# Patient Record
Sex: FEMALE | URBAN - METROPOLITAN AREA
[De-identification: names, ages, dates, MRNs, and addresses within clinical notes are randomized per-mention and may not be internally consistent; named-entity substitution may affect disease eponyms.]

---

## 2021-12-06 ENCOUNTER — APPOINTMENT (OUTPATIENT)
Dept: URBAN - METROPOLITAN AREA CLINIC 268 | Age: 76
Setting detail: DERMATOLOGY
End: 2021-12-07

## 2021-12-06 PROBLEM — C44.311 BASAL CELL CARCINOMA OF SKIN OF NOSE: Status: ACTIVE | Noted: 2021-12-06

## 2021-12-06 PROBLEM — C44.319 BASAL CELL CARCINOMA OF SKIN OF OTHER PARTS OF FACE: Status: ACTIVE | Noted: 2021-12-06

## 2021-12-06 PROCEDURE — OTHER TREATMENT REGIMEN: OTHER

## 2021-12-06 PROCEDURE — 77285 THER RAD SIMULAJ FIELD INTRM: CPT

## 2021-12-06 PROCEDURE — 99213 OFFICE O/P EST LOW 20 MIN: CPT | Mod: 25

## 2021-12-06 PROCEDURE — 77300 RADIATION THERAPY DOSE PLAN: CPT

## 2021-12-06 PROCEDURE — 77334 RADIATION TREATMENT AID(S): CPT

## 2021-12-06 PROCEDURE — G6001 ECHO GUIDANCE RADIOTHERAPY: HCPCS

## 2021-12-06 PROCEDURE — 77262 THER RADIOLOGY TX PLNG INTRM: CPT

## 2021-12-06 PROCEDURE — OTHER SUPERFICIAL RADIATION TREATMENT: OTHER

## 2021-12-06 NOTE — PROCEDURE: SUPERFICIAL RADIATION TREATMENT
Bill For Simulation And Treatment Device Design: Yes - (Intermediate Simulation: 14999) Bill For Simulation And Treatment Device Design: Yes - (Intermediate Simulation: 55329)

## 2021-12-06 NOTE — PROCEDURE: TREATMENT REGIMEN
Plan: Per the request of Dr. Martinez, patient was seen today for Superficial Radiation Therapy requiring simulation (CPT® 96804) in preparation for treatment of specific diseased sites. Simulation is necessary to determine correct patient and treatment portal positioning, deliver safe and effective radiation therapy. A high frequency ultrasound image was acquired prior to treatment today for three-dimensional evaluation of tumor volume and response to treatment, in addition, geometric accuracy of field placement (CPT® ). Physician evaluation of the ultrasound tumor depth will be ongoing through course of treatment and is deemed medically necessary ensuring efficacy of treatment. Today’s image and setup was evaluated determining continuation of treatment with the current plan, or necessary changes as appropriate. All appropriate custom blocking and treatment parameters verified by radiation therapist according to initial simulation.\\n\\nUS image guidance and field placement prior to treatment delivery performed. US depth is 1.62 mm.\\n\\nPer Dr. Martinez, continued daily US guidance and simulation is required for field placement, measurement of tumor depth, progress, and edema monitoring.\\n\\nPer the request of Dr. Martinez, continuing medical physics review as per radiotherapy standard of care post every 5th fraction for patient, including assessment of treatment parameters,  of dose delivery, and review of patient treatment documentation in support of the provider, is ordered, ensuring efficacy and continued safe delivery of radiotherapy. Included in physics check is review of patient setup information, all pertinent simulation and treatment photographs checks, prescription, dose calculation verification, daily dose charted correctly, elapsed days and treatment days correctly charted, cumulative dose correct, and review of any prescription changes. Continued medical physics review post every 5th fraction of therapy is requested by provider for appropriate radiotherapy management and is deemed medically necessary and standard of care.\\n Plan: Per the request of Dr. Martinez, patient was seen today for Superficial Radiation Therapy requiring simulation (CPT® 10566) in preparation for treatment of specific diseased sites. Simulation is necessary to determine correct patient and treatment portal positioning, deliver safe and effective radiation therapy. A high frequency ultrasound image was acquired prior to treatment today for three-dimensional evaluation of tumor volume and response to treatment, in addition, geometric accuracy of field placement (CPT® ). Physician evaluation of the ultrasound tumor depth will be ongoing through course of treatment and is deemed medically necessary ensuring efficacy of treatment. Today’s image and setup was evaluated determining continuation of treatment with the current plan, or necessary changes as appropriate. All appropriate custom blocking and treatment parameters verified by radiation therapist according to initial simulation.\\n\\nUS image guidance and field placement prior to treatment delivery performed. US depth is 1.62 mm.\\n\\nPer Dr. Martinez, continued daily US guidance and simulation is required for field placement, measurement of tumor depth, progress, and edema monitoring.\\n\\nPer the request of Dr. Martinez, continuing medical physics review as per radiotherapy standard of care post every 5th fraction for patient, including assessment of treatment parameters,  of dose delivery, and review of patient treatment documentation in support of the provider, is ordered, ensuring efficacy and continued safe delivery of radiotherapy. Included in physics check is review of patient setup information, all pertinent simulation and treatment photographs checks, prescription, dose calculation verification, daily dose charted correctly, elapsed days and treatment days correctly charted, cumulative dose correct, and review of any prescription changes. Continued medical physics review post every 5th fraction of therapy is requested by provider for appropriate radiotherapy management and is deemed medically necessary and standard of care.\\n

## 2021-12-06 NOTE — PROCEDURE: TREATMENT REGIMEN
Plan: Per the request of Dr. Martinez, patient was seen today for Superficial Radiation Therapy requiring simulation (CPT® 46543) in preparation for treatment of specific diseased sites. Simulation is necessary to determine correct patient and treatment portal positioning, deliver safe and effective radiation therapy. A high frequency ultrasound image was acquired prior to treatment today for three-dimensional evaluation of tumor volume and response to treatment, in addition, geometric accuracy of field placement (CPT® ). Physician evaluation of the ultrasound tumor depth will be ongoing through course of treatment and is deemed medically necessary ensuring efficacy of treatment. Today’s image and setup was evaluated determining continuation of treatment with the current plan, or necessary changes as appropriate. All appropriate custom blocking and treatment parameters verified by radiation therapist according to initial simulation.\\n\\nUS image guidance and field placement prior to treatment delivery performed. US depth is 2.22 mm.\\n\\nPer Dr. Martinez, continued daily US guidance and simulation is required for field placement, measurement of tumor depth, progress, and edema monitoring.\\n\\nPer the request of Dr. Martinez, continuing medical physics review as per radiotherapy standard of care post every 5th fraction for patient, including assessment of treatment parameters,  of dose delivery, and review of patient treatment documentation in support of the provider, is ordered, ensuring efficacy and continued safe delivery of radiotherapy. Included in physics check is review of patient setup information, all pertinent simulation and treatment photographs checks, prescription, dose calculation verification, daily dose charted correctly, elapsed days and treatment days correctly charted, cumulative dose correct, and review of any prescription changes. Continued medical physics review post every 5th fraction of therapy is requested by provider for appropriate radiotherapy management and is deemed medically necessary and standard of care.\\n Plan: Per the request of Dr. Martinez, patient was seen today for Superficial Radiation Therapy requiring simulation (CPT® 98953) in preparation for treatment of specific diseased sites. Simulation is necessary to determine correct patient and treatment portal positioning, deliver safe and effective radiation therapy. A high frequency ultrasound image was acquired prior to treatment today for three-dimensional evaluation of tumor volume and response to treatment, in addition, geometric accuracy of field placement (CPT® ). Physician evaluation of the ultrasound tumor depth will be ongoing through course of treatment and is deemed medically necessary ensuring efficacy of treatment. Today’s image and setup was evaluated determining continuation of treatment with the current plan, or necessary changes as appropriate. All appropriate custom blocking and treatment parameters verified by radiation therapist according to initial simulation.\\n\\nUS image guidance and field placement prior to treatment delivery performed. US depth is 2.22 mm.\\n\\nPer Dr. Martinez, continued daily US guidance and simulation is required for field placement, measurement of tumor depth, progress, and edema monitoring.\\n\\nPer the request of Dr. Martinez, continuing medical physics review as per radiotherapy standard of care post every 5th fraction for patient, including assessment of treatment parameters,  of dose delivery, and review of patient treatment documentation in support of the provider, is ordered, ensuring efficacy and continued safe delivery of radiotherapy. Included in physics check is review of patient setup information, all pertinent simulation and treatment photographs checks, prescription, dose calculation verification, daily dose charted correctly, elapsed days and treatment days correctly charted, cumulative dose correct, and review of any prescription changes. Continued medical physics review post every 5th fraction of therapy is requested by provider for appropriate radiotherapy management and is deemed medically necessary and standard of care.\\n

## 2021-12-07 ENCOUNTER — APPOINTMENT (OUTPATIENT)
Dept: URBAN - METROPOLITAN AREA CLINIC 268 | Age: 76
Setting detail: DERMATOLOGY
End: 2021-12-08

## 2021-12-07 PROBLEM — C44.311 BASAL CELL CARCINOMA OF SKIN OF NOSE: Status: ACTIVE | Noted: 2021-12-07

## 2021-12-07 PROBLEM — C44.319 BASAL CELL CARCINOMA OF SKIN OF OTHER PARTS OF FACE: Status: ACTIVE | Noted: 2021-12-07

## 2021-12-07 PROCEDURE — 77401 RADIATION TX DELIVERY SUPFC: CPT

## 2021-12-07 PROCEDURE — G6001 ECHO GUIDANCE RADIOTHERAPY: HCPCS

## 2021-12-07 PROCEDURE — 77280 THER RAD SIMULAJ FIELD SMPL: CPT

## 2021-12-07 PROCEDURE — OTHER TREATMENT REGIMEN: OTHER

## 2021-12-07 PROCEDURE — OTHER SUPERFICIAL RADIATION TREATMENT: OTHER

## 2021-12-07 NOTE — PROCEDURE: TREATMENT REGIMEN
Plan: This patient has been treated today with image guided superficial radiation therapy for non-melanoma skin cancer. Written informed consent has been previously obtained from this patient for this treatment. This consent is documented in the patient’s chart. The patient gave verbal consent to continue treatment today. The patient was treated with a specific radiation dose and setup as prescribed by the provider listed on this visit note. A Radiation Therapist performed administration of radiation under supervision of provider. The treatment parameters and cumulative dose are indicated above. Prior to administering the radiation, the patient underwent a verification therapeutic radiology simulation-aided field setting defining relevant normal and abnormal target anatomy and acquiring images with high frequency ultrasound in addition to data necessary developing optimal radiation treatment process for the patient. This process includes verification of the treatment port(s) and proper treatment positioning. All treatment ports were photographed within electronic medical record. The patient’s customized lead blocking along with gross tumor volume and margin was confirmed. Considering superficial radiotherapy is clinical in setup, this requires physician and radiation therapist to clarify location interest being treated against initial images, pathology and patient anatomy. Care was taken ensuring fields treated were geometrically accurate and properly positioned using therapeutic radiology simulation-aided field setting verification per fraction. This process is also utilized to determine if any prescription or setup changes are necessary. These steps are therefore medically necessary ensuring safe and effective administration of radiation. Ongoing therapeutic radiology simulation-aided field setting verification is ordered throughout course of therapy.\\nA high frequency ultrasound image was acquired today for two-dimensional evaluation of the tumor volume and response to treatment, in addition to geometric accuracy of field placement. US depth is 1.22 mm, which is 1.0 mm in difference from previous imaging. The field placement and ultrasound imaging, per fraction, is separate and distinct from the initial simulation, and is an important task in providing safe administration of superficial radiation therapy. Physician evaluation of the ultrasound tumor depth will be ongoing throughout the course of treatment and is deemed medically necessary in order to ensure the efficacy of treatment and any necessary changes. Today’s image was evaluated for determination of continuation of treatment with the current plan or with necessary changes as appropriate. According to provider review of verification therapeutic radiology simulation-aided field setting and imaging, NO change is required . Additionally, the use of ultrasound visualization and targeted assessment allows the patient to be able to see their cancer(s) progress, encouraging patient to complete and maintain compliance through full course of radiotherapy. Per Dr. Martinez, continued ultrasound guidance and therapeutic radiology simulation-aided field setting verification per fraction is required for field placement, measurement of tumor depth, progress, and acute effect monitoring. \\n
Plan: This patient has been treated today with image guided superficial radiation therapy for non-melanoma skin cancer. Written informed consent has been previously obtained from this patient for this treatment. This consent is documented in the patient’s chart. The patient gave verbal consent to continue treatment today. The patient was treated with a specific radiation dose and setup as prescribed by the provider listed on this visit note. A Radiation Therapist performed administration of radiation under supervision of provider. The treatment parameters and cumulative dose are indicated above. Prior to administering the radiation, the patient underwent a verification therapeutic radiology simulation-aided field setting defining relevant normal and abnormal target anatomy and acquiring images with high frequency ultrasound in addition to data necessary developing optimal radiation treatment process for the patient. This process includes verification of the treatment port(s) and proper treatment positioning. All treatment ports were photographed within electronic medical record. The patient’s customized lead blocking along with gross tumor volume and margin was confirmed. Considering superficial radiotherapy is clinical in setup, this requires physician and radiation therapist to clarify location interest being treated against initial images, pathology and patient anatomy. Care was taken ensuring fields treated were geometrically accurate and properly positioned using therapeutic radiology simulation-aided field setting verification per fraction. This process is also utilized to determine if any prescription or setup changes are necessary. These steps are therefore medically necessary ensuring safe and effective administration of radiation. Ongoing therapeutic radiology simulation-aided field setting verification is ordered throughout course of therapy.\\nA high frequency ultrasound image was acquired today for two-dimensional evaluation of the tumor volume and response to treatment, in addition to geometric accuracy of field placement. US depth is  0.96 mm, which is 0.66 mm in difference from previous imaging. The field placement and ultrasound imaging, per fraction, is separate and distinct from the initial simulation, and is an important task in providing safe administration of superficial radiation therapy. Physician evaluation of the ultrasound tumor depth will be ongoing throughout the course of treatment and is deemed medically necessary in order to ensure the efficacy of treatment and any necessary changes. Today’s image was evaluated for determination of continuation of treatment with the current plan or with necessary changes as appropriate. According to provider review of verification therapeutic radiology simulation-aided field setting and imaging, (NO) or (X) change is required (if change required, please document specifics and changes. If further services rendered for changes, document and submit appropriate billable CPT® codes). Additionally, the use of ultrasound visualization and targeted assessment allows the patient to be able to see their cancer(s) progress, encouraging patient to complete and maintain compliance through full course of radiotherapy. Per Dr. Martinez, continued ultrasound guidance and therapeutic radiology simulation-aided field setting verification per fraction is required for field placement, measurement of tumor depth, progress, and acute effect monitoring. \\n\\n
Detail Level: Zone

## 2021-12-07 NOTE — PROCEDURE: SUPERFICIAL RADIATION TREATMENT
Bill For Simulation And Treatment Device Design: Yes - (Intermediate Simulation: 73804) Bill For Simulation And Treatment Device Design: Yes - (Intermediate Simulation: 55441)

## 2021-12-09 ENCOUNTER — APPOINTMENT (OUTPATIENT)
Dept: URBAN - METROPOLITAN AREA CLINIC 268 | Age: 76
Setting detail: DERMATOLOGY
End: 2021-12-10

## 2021-12-09 PROBLEM — C44.319 BASAL CELL CARCINOMA OF SKIN OF OTHER PARTS OF FACE: Status: ACTIVE | Noted: 2021-12-09

## 2021-12-09 PROBLEM — C44.311 BASAL CELL CARCINOMA OF SKIN OF NOSE: Status: ACTIVE | Noted: 2021-12-09

## 2021-12-09 PROCEDURE — G6001 ECHO GUIDANCE RADIOTHERAPY: HCPCS

## 2021-12-09 PROCEDURE — 77401 RADIATION TX DELIVERY SUPFC: CPT

## 2021-12-09 PROCEDURE — 77280 THER RAD SIMULAJ FIELD SMPL: CPT

## 2021-12-09 PROCEDURE — OTHER TREATMENT REGIMEN: OTHER

## 2021-12-09 PROCEDURE — OTHER SUPERFICIAL RADIATION TREATMENT: OTHER

## 2021-12-09 NOTE — PROCEDURE: SUPERFICIAL RADIATION TREATMENT
Bill For Simulation And Treatment Device Design: Yes - (Intermediate Simulation: 08219) Bill For Simulation And Treatment Device Design: Yes - (Intermediate Simulation: 92496)

## 2021-12-09 NOTE — PROCEDURE: TREATMENT REGIMEN
Detail Level: Zone
Plan: This patient has been treated today with image guided superficial radiation therapy for non-melanoma skin cancer. Written informed consent has been previously obtained from this patient for this treatment. This consent is documented in the patient’s chart. The patient gave verbal consent to continue treatment today. The patient was treated with a specific radiation dose and setup as prescribed by the provider listed on this visit note. A Radiation Therapist performed administration of radiation under supervision of provider. The treatment parameters and cumulative dose are indicated above. Prior to administering the radiation, the patient underwent a verification therapeutic radiology simulation-aided field setting defining relevant normal and abnormal target anatomy and acquiring images with high frequency ultrasound in addition to data necessary developing optimal radiation treatment process for the patient. This process includes verification of the treatment port(s) and proper treatment positioning. All treatment ports were photographed within electronic medical record. The patient’s customized lead blocking along with gross tumor volume and margin was confirmed. Considering superficial radiotherapy is clinical in setup, this requires physician and radiation therapist to clarify location interest being treated against initial images, pathology and patient anatomy. Care was taken ensuring fields treated were geometrically accurate and properly positioned using therapeutic radiology simulation-aided field setting verification per fraction. This process is also utilized to determine if any prescription or setup changes are necessary. These steps are therefore medically necessary ensuring safe and effective administration of radiation. Ongoing therapeutic radiology simulation-aided field setting verification is ordered throughout course of therapy.\\nA high frequency ultrasound image was acquired today for two-dimensional evaluation of the tumor volume and response to treatment, in addition to geometric accuracy of field placement. US depth is 1.74 mm, which is 0.52 mm in difference from previous imaging. The field placement and ultrasound imaging, per fraction, is separate and distinct from the initial simulation, and is an important task in providing safe administration of superficial radiation therapy. Physician evaluation of the ultrasound tumor depth will be ongoing throughout the course of treatment and is deemed medically necessary in order to ensure the efficacy of treatment and any necessary changes. Today’s image was evaluated for determination of continuation of treatment with the current plan or with necessary changes as appropriate. According to provider review of verification therapeutic radiology simulation-aided field setting and imaging, NO change is required . Additionally, the use of ultrasound visualization and targeted assessment allows the patient to be able to see their cancer(s) progress, encouraging patient to complete and maintain compliance through full course of radiotherapy. Per Dr. Martinez, continued ultrasound guidance and therapeutic radiology simulation-aided field setting verification per fraction is required for field placement, measurement of tumor depth, progress, and acute effect monitoring. \\n
Plan: This patient has been treated today with image guided superficial radiation therapy for non-melanoma skin cancer. Written informed consent has been previously obtained from this patient for this treatment. This consent is documented in the patient’s chart. The patient gave verbal consent to continue treatment today. The patient was treated with a specific radiation dose and setup as prescribed by the provider listed on this visit note. A Radiation Therapist performed administration of radiation under supervision of provider. The treatment parameters and cumulative dose are indicated above. Prior to administering the radiation, the patient underwent a verification therapeutic radiology simulation-aided field setting defining relevant normal and abnormal target anatomy and acquiring images with high frequency ultrasound in addition to data necessary developing optimal radiation treatment process for the patient. This process includes verification of the treatment port(s) and proper treatment positioning. All treatment ports were photographed within electronic medical record. The patient’s customized lead blocking along with gross tumor volume and margin was confirmed. Considering superficial radiotherapy is clinical in setup, this requires physician and radiation therapist to clarify location interest being treated against initial images, pathology and patient anatomy. Care was taken ensuring fields treated were geometrically accurate and properly positioned using therapeutic radiology simulation-aided field setting verification per fraction. This process is also utilized to determine if any prescription or setup changes are necessary. These steps are therefore medically necessary ensuring safe and effective administration of radiation. Ongoing therapeutic radiology simulation-aided field setting verification is ordered throughout course of therapy.\\nA high frequency ultrasound image was acquired today for two-dimensional evaluation of the tumor volume and response to treatment, in addition to geometric accuracy of field placement. US depth is  1.15 mm, which is 0.19 mm in difference from previous imaging. The field placement and ultrasound imaging, per fraction, is separate and distinct from the initial simulation, and is an important task in providing safe administration of superficial radiation therapy. Physician evaluation of the ultrasound tumor depth will be ongoing throughout the course of treatment and is deemed medically necessary in order to ensure the efficacy of treatment and any necessary changes. Today’s image was evaluated for determination of continuation of treatment with the current plan or with necessary changes as appropriate. According to provider review of verification therapeutic radiology simulation-aided field setting and imaging, (NO) or (X) change is required (if change required, please document specifics and changes. If further services rendered for changes, document and submit appropriate billable CPT® codes). Additionally, the use of ultrasound visualization and targeted assessment allows the patient to be able to see their cancer(s) progress, encouraging patient to complete and maintain compliance through full course of radiotherapy. Per Dr. Martinez, continued ultrasound guidance and therapeutic radiology simulation-aided field setting verification per fraction is required for field placement, measurement of tumor depth, progress, and acute effect monitoring. \\n\\n

## 2021-12-13 ENCOUNTER — APPOINTMENT (OUTPATIENT)
Dept: URBAN - METROPOLITAN AREA CLINIC 268 | Age: 76
Setting detail: DERMATOLOGY
End: 2021-12-13

## 2021-12-13 PROBLEM — C44.319 BASAL CELL CARCINOMA OF SKIN OF OTHER PARTS OF FACE: Status: ACTIVE | Noted: 2021-12-13

## 2021-12-13 PROBLEM — C44.311 BASAL CELL CARCINOMA OF SKIN OF NOSE: Status: ACTIVE | Noted: 2021-12-13

## 2021-12-13 PROCEDURE — 77280 THER RAD SIMULAJ FIELD SMPL: CPT

## 2021-12-13 PROCEDURE — 77401 RADIATION TX DELIVERY SUPFC: CPT

## 2021-12-13 PROCEDURE — OTHER TREATMENT REGIMEN: OTHER

## 2021-12-13 PROCEDURE — G6001 ECHO GUIDANCE RADIOTHERAPY: HCPCS

## 2021-12-13 PROCEDURE — OTHER SUPERFICIAL RADIATION TREATMENT: OTHER

## 2021-12-13 NOTE — PROCEDURE: TREATMENT REGIMEN
Plan: This patient has been treated today with image guided superficial radiation therapy for non-melanoma skin cancer. Written informed consent has been previously obtained from this patient for this treatment. This consent is documented in the patient’s chart. The patient gave verbal consent to continue treatment today. The patient was treated with a specific radiation dose and setup as prescribed by the provider listed on this visit note. A Radiation Therapist performed administration of radiation under supervision of provider. The treatment parameters and cumulative dose are indicated above. Prior to administering the radiation, the patient underwent a verification therapeutic radiology simulation-aided field setting defining relevant normal and abnormal target anatomy and acquiring images with high frequency ultrasound in addition to data necessary developing optimal radiation treatment process for the patient. This process includes verification of the treatment port(s) and proper treatment positioning. All treatment ports were photographed within electronic medical record. The patient’s customized lead blocking along with gross tumor volume and margin was confirmed. Considering superficial radiotherapy is clinical in setup, this requires physician and radiation therapist to clarify location interest being treated against initial images, pathology and patient anatomy. Care was taken ensuring fields treated were geometrically accurate and properly positioned using therapeutic radiology simulation-aided field setting verification per fraction. This process is also utilized to determine if any prescription or setup changes are necessary. These steps are therefore medically necessary ensuring safe and effective administration of radiation. Ongoing therapeutic radiology simulation-aided field setting verification is ordered throughout course of therapy.\\nA high frequency ultrasound image was acquired today for two-dimensional evaluation of the tumor volume and response to treatment, in addition to geometric accuracy of field placement. US depth is  1.35 mm, which is 0.20 mm in difference from previous imaging. The field placement and ultrasound imaging, per fraction, is separate and distinct from the initial simulation, and is an important task in providing safe administration of superficial radiation therapy. Physician evaluation of the ultrasound tumor depth will be ongoing throughout the course of treatment and is deemed medically necessary in order to ensure the efficacy of treatment and any necessary changes. Today’s image was evaluated for determination of continuation of treatment with the current plan or with necessary changes as appropriate. According to provider review of verification therapeutic radiology simulation-aided field setting and imaging, NO change necessary. Additionally, the use of ultrasound visualization and targeted assessment allows the patient to be able to see their cancer(s) progress, encouraging patient to complete and maintain compliance through full course of radiotherapy. Per Dr. Martinez, continued ultrasound guidance and therapeutic radiology simulation-aided field setting verification per fraction is required for field placement, measurement of tumor depth, progress, and acute effect monitoring. \\n\\n
Detail Level: Zone
Plan: This patient has been treated today with image guided superficial radiation therapy for non-melanoma skin cancer. Written informed consent has been previously obtained from this patient for this treatment. This consent is documented in the patient’s chart. The patient gave verbal consent to continue treatment today. The patient was treated with a specific radiation dose and setup as prescribed by the provider listed on this visit note. A Radiation Therapist performed administration of radiation under supervision of provider. The treatment parameters and cumulative dose are indicated above. Prior to administering the radiation, the patient underwent a verification therapeutic radiology simulation-aided field setting defining relevant normal and abnormal target anatomy and acquiring images with high frequency ultrasound in addition to data necessary developing optimal radiation treatment process for the patient. This process includes verification of the treatment port(s) and proper treatment positioning. All treatment ports were photographed within electronic medical record. The patient’s customized lead blocking along with gross tumor volume and margin was confirmed. Considering superficial radiotherapy is clinical in setup, this requires physician and radiation therapist to clarify location interest being treated against initial images, pathology and patient anatomy. Care was taken ensuring fields treated were geometrically accurate and properly positioned using therapeutic radiology simulation-aided field setting verification per fraction. This process is also utilized to determine if any prescription or setup changes are necessary. These steps are therefore medically necessary ensuring safe and effective administration of radiation. Ongoing therapeutic radiology simulation-aided field setting verification is ordered throughout course of therapy.\\nA high frequency ultrasound image was acquired today for two-dimensional evaluation of the tumor volume and response to treatment, in addition to geometric accuracy of field placement. US depth is 1.21 mm, which is 0.53 mm in difference from previous imaging. The field placement and ultrasound imaging, per fraction, is separate and distinct from the initial simulation, and is an important task in providing safe administration of superficial radiation therapy. Physician evaluation of the ultrasound tumor depth will be ongoing throughout the course of treatment and is deemed medically necessary in order to ensure the efficacy of treatment and any necessary changes. Today’s image was evaluated for determination of continuation of treatment with the current plan or with necessary changes as appropriate. According to provider review of verification therapeutic radiology simulation-aided field setting and imaging, NO change is required . Additionally, the use of ultrasound visualization and targeted assessment allows the patient to be able to see their cancer(s) progress, encouraging patient to complete and maintain compliance through full course of radiotherapy. Per Dr. Martinez, continued ultrasound guidance and therapeutic radiology simulation-aided field setting verification per fraction is required for field placement, measurement of tumor depth, progress, and acute effect monitoring. \\n

## 2021-12-13 NOTE — PROCEDURE: SUPERFICIAL RADIATION TREATMENT
Bill For Simulation And Treatment Device Design: Yes - (Intermediate Simulation: 53027) Bill For Simulation And Treatment Device Design: Yes - (Intermediate Simulation: 21287)

## 2021-12-14 ENCOUNTER — APPOINTMENT (OUTPATIENT)
Dept: URBAN - METROPOLITAN AREA CLINIC 268 | Age: 76
Setting detail: DERMATOLOGY
End: 2021-12-16

## 2021-12-14 PROBLEM — C44.319 BASAL CELL CARCINOMA OF SKIN OF OTHER PARTS OF FACE: Status: ACTIVE | Noted: 2021-12-14

## 2021-12-14 PROBLEM — C44.311 BASAL CELL CARCINOMA OF SKIN OF NOSE: Status: ACTIVE | Noted: 2021-12-14

## 2021-12-14 PROCEDURE — 77280 THER RAD SIMULAJ FIELD SMPL: CPT

## 2021-12-14 PROCEDURE — 77401 RADIATION TX DELIVERY SUPFC: CPT

## 2021-12-14 PROCEDURE — OTHER SUPERFICIAL RADIATION TREATMENT: OTHER

## 2021-12-14 PROCEDURE — OTHER TREATMENT REGIMEN: OTHER

## 2021-12-14 PROCEDURE — G6001 ECHO GUIDANCE RADIOTHERAPY: HCPCS

## 2021-12-14 NOTE — PROCEDURE: TREATMENT REGIMEN
Detail Level: Zone
Plan: This patient has been treated today with image guided superficial radiation therapy for non-melanoma skin cancer. Written informed consent has been previously obtained from this patient for this treatment. This consent is documented in the patient’s chart. The patient gave verbal consent to continue treatment today. The patient was treated with a specific radiation dose and setup as prescribed by the provider listed on this visit note. A Radiation Therapist performed administration of radiation under supervision of provider. The treatment parameters and cumulative dose are indicated above. Prior to administering the radiation, the patient underwent a verification therapeutic radiology simulation-aided field setting defining relevant normal and abnormal target anatomy and acquiring images with high frequency ultrasound in addition to data necessary developing optimal radiation treatment process for the patient. This process includes verification of the treatment port(s) and proper treatment positioning. All treatment ports were photographed within electronic medical record. The patient’s customized lead blocking along with gross tumor volume and margin was confirmed. Considering superficial radiotherapy is clinical in setup, this requires physician and radiation therapist to clarify location interest being treated against initial images, pathology and patient anatomy. Care was taken ensuring fields treated were geometrically accurate and properly positioned using therapeutic radiology simulation-aided field setting verification per fraction. This process is also utilized to determine if any prescription or setup changes are necessary. These steps are therefore medically necessary ensuring safe and effective administration of radiation. Ongoing therapeutic radiology simulation-aided field setting verification is ordered throughout course of therapy.\\nA high frequency ultrasound image was acquired today for two-dimensional evaluation of the tumor volume and response to treatment, in addition to geometric accuracy of field placement. US depth is  1.62 mm, which is 0.27 mm in difference from previous imaging. The field placement and ultrasound imaging, per fraction, is separate and distinct from the initial simulation, and is an important task in providing safe administration of superficial radiation therapy. Physician evaluation of the ultrasound tumor depth will be ongoing throughout the course of treatment and is deemed medically necessary in order to ensure the efficacy of treatment and any necessary changes. Today’s image was evaluated for determination of continuation of treatment with the current plan or with necessary changes as appropriate. According to provider review of verification therapeutic radiology simulation-aided field setting and imaging, NO change necessary. Additionally, the use of ultrasound visualization and targeted assessment allows the patient to be able to see their cancer(s) progress, encouraging patient to complete and maintain compliance through full course of radiotherapy. Per Dr. Martinez, continued ultrasound guidance and therapeutic radiology simulation-aided field setting verification per fraction is required for field placement, measurement of tumor depth, progress, and acute effect monitoring. \\n\\n
Plan: This patient has been treated today with image guided superficial radiation therapy for non-melanoma skin cancer. Written informed consent has been previously obtained from this patient for this treatment. This consent is documented in the patient’s chart. The patient gave verbal consent to continue treatment today. The patient was treated with a specific radiation dose and setup as prescribed by the provider listed on this visit note. A Radiation Therapist performed administration of radiation under supervision of provider. The treatment parameters and cumulative dose are indicated above. Prior to administering the radiation, the patient underwent a verification therapeutic radiology simulation-aided field setting defining relevant normal and abnormal target anatomy and acquiring images with high frequency ultrasound in addition to data necessary developing optimal radiation treatment process for the patient. This process includes verification of the treatment port(s) and proper treatment positioning. All treatment ports were photographed within electronic medical record. The patient’s customized lead blocking along with gross tumor volume and margin was confirmed. Considering superficial radiotherapy is clinical in setup, this requires physician and radiation therapist to clarify location interest being treated against initial images, pathology and patient anatomy. Care was taken ensuring fields treated were geometrically accurate and properly positioned using therapeutic radiology simulation-aided field setting verification per fraction. This process is also utilized to determine if any prescription or setup changes are necessary. These steps are therefore medically necessary ensuring safe and effective administration of radiation. Ongoing therapeutic radiology simulation-aided field setting verification is ordered throughout course of therapy.\\nA high frequency ultrasound image was acquired today for two-dimensional evaluation of the tumor volume and response to treatment, in addition to geometric accuracy of field placement. US depth is 1.88 mm, which is 0.67 mm in difference from previous imaging. The field placement and ultrasound imaging, per fraction, is separate and distinct from the initial simulation, and is an important task in providing safe administration of superficial radiation therapy. Physician evaluation of the ultrasound tumor depth will be ongoing throughout the course of treatment and is deemed medically necessary in order to ensure the efficacy of treatment and any necessary changes. Today’s image was evaluated for determination of continuation of treatment with the current plan or with necessary changes as appropriate. According to provider review of verification therapeutic radiology simulation-aided field setting and imaging, NO change is required . Additionally, the use of ultrasound visualization and targeted assessment allows the patient to be able to see their cancer(s) progress, encouraging patient to complete and maintain compliance through full course of radiotherapy. Per Dr. Martinez, continued ultrasound guidance and therapeutic radiology simulation-aided field setting verification per fraction is required for field placement, measurement of tumor depth, progress, and acute effect monitoring. \\n

## 2021-12-14 NOTE — PROCEDURE: SUPERFICIAL RADIATION TREATMENT
Bill For Simulation And Treatment Device Design: Yes - (Intermediate Simulation: 78607) Bill For Simulation And Treatment Device Design: Yes - (Intermediate Simulation: 13120)

## 2021-12-16 ENCOUNTER — APPOINTMENT (OUTPATIENT)
Dept: URBAN - METROPOLITAN AREA CLINIC 268 | Age: 76
Setting detail: DERMATOLOGY
End: 2021-12-17

## 2021-12-16 PROBLEM — C44.311 BASAL CELL CARCINOMA OF SKIN OF NOSE: Status: ACTIVE | Noted: 2021-12-16

## 2021-12-16 PROBLEM — C44.319 BASAL CELL CARCINOMA OF SKIN OF OTHER PARTS OF FACE: Status: ACTIVE | Noted: 2021-12-16

## 2021-12-16 PROCEDURE — 77280 THER RAD SIMULAJ FIELD SMPL: CPT

## 2021-12-16 PROCEDURE — G6001 ECHO GUIDANCE RADIOTHERAPY: HCPCS

## 2021-12-16 PROCEDURE — OTHER SUPERFICIAL RADIATION TREATMENT: OTHER

## 2021-12-16 PROCEDURE — OTHER TREATMENT REGIMEN: OTHER

## 2021-12-16 PROCEDURE — 77401 RADIATION TX DELIVERY SUPFC: CPT

## 2021-12-16 NOTE — PROCEDURE: TREATMENT REGIMEN
Detail Level: Zone
Plan: This patient has been treated today with image guided superficial radiation therapy for non-melanoma skin cancer. Written informed consent has been previously obtained from this patient for this treatment. This consent is documented in the patient’s chart. The patient gave verbal consent to continue treatment today. The patient was treated with a specific radiation dose and setup as prescribed by the provider listed on this visit note. A Radiation Therapist performed administration of radiation under supervision of provider. The treatment parameters and cumulative dose are indicated above. Prior to administering the radiation, the patient underwent a verification therapeutic radiology simulation-aided field setting defining relevant normal and abnormal target anatomy and acquiring images with high frequency ultrasound in addition to data necessary developing optimal radiation treatment process for the patient. This process includes verification of the treatment port(s) and proper treatment positioning. All treatment ports were photographed within electronic medical record. The patient’s customized lead blocking along with gross tumor volume and margin was confirmed. Considering superficial radiotherapy is clinical in setup, this requires physician and radiation therapist to clarify location interest being treated against initial images, pathology and patient anatomy. Care was taken ensuring fields treated were geometrically accurate and properly positioned using therapeutic radiology simulation-aided field setting verification per fraction. This process is also utilized to determine if any prescription or setup changes are necessary. These steps are therefore medically necessary ensuring safe and effective administration of radiation. Ongoing therapeutic radiology simulation-aided field setting verification is ordered throughout course of therapy.\\nA high frequency ultrasound image was acquired today for two-dimensional evaluation of the tumor volume and response to treatment, in addition to geometric accuracy of field placement. US depth is  1.13 mm, which is 0.49 mm in difference from previous imaging. The field placement and ultrasound imaging, per fraction, is separate and distinct from the initial simulation, and is an important task in providing safe administration of superficial radiation therapy. Physician evaluation of the ultrasound tumor depth will be ongoing throughout the course of treatment and is deemed medically necessary in order to ensure the efficacy of treatment and any necessary changes. Today’s image was evaluated for determination of continuation of treatment with the current plan or with necessary changes as appropriate. According to provider review of verification therapeutic radiology simulation-aided field setting and imaging, NO change necessary. Additionally, the use of ultrasound visualization and targeted assessment allows the patient to be able to see their cancer(s) progress, encouraging patient to complete and maintain compliance through full course of radiotherapy. Per Dr. Martinez, continued ultrasound guidance and therapeutic radiology simulation-aided field setting verification per fraction is required for field placement, measurement of tumor depth, progress, and acute effect monitoring. \\n\\n
Plan: This patient has been treated today with image guided superficial radiation therapy for non-melanoma skin cancer. Written informed consent has been previously obtained from this patient for this treatment. This consent is documented in the patient’s chart. The patient gave verbal consent to continue treatment today. The patient was treated with a specific radiation dose and setup as prescribed by the provider listed on this visit note. A Radiation Therapist performed administration of radiation under supervision of provider. The treatment parameters and cumulative dose are indicated above. Prior to administering the radiation, the patient underwent a verification therapeutic radiology simulation-aided field setting defining relevant normal and abnormal target anatomy and acquiring images with high frequency ultrasound in addition to data necessary developing optimal radiation treatment process for the patient. This process includes verification of the treatment port(s) and proper treatment positioning. All treatment ports were photographed within electronic medical record. The patient’s customized lead blocking along with gross tumor volume and margin was confirmed. Considering superficial radiotherapy is clinical in setup, this requires physician and radiation therapist to clarify location interest being treated against initial images, pathology and patient anatomy. Care was taken ensuring fields treated were geometrically accurate and properly positioned using therapeutic radiology simulation-aided field setting verification per fraction. This process is also utilized to determine if any prescription or setup changes are necessary. These steps are therefore medically necessary ensuring safe and effective administration of radiation. Ongoing therapeutic radiology simulation-aided field setting verification is ordered throughout course of therapy.\\nA high frequency ultrasound image was acquired today for two-dimensional evaluation of the tumor volume and response to treatment, in addition to geometric accuracy of field placement. US depth is 1.43 mm, which is 0.45 mm in difference from previous imaging. The field placement and ultrasound imaging, per fraction, is separate and distinct from the initial simulation, and is an important task in providing safe administration of superficial radiation therapy. Physician evaluation of the ultrasound tumor depth will be ongoing throughout the course of treatment and is deemed medically necessary in order to ensure the efficacy of treatment and any necessary changes. Today’s image was evaluated for determination of continuation of treatment with the current plan or with necessary changes as appropriate. According to provider review of verification therapeutic radiology simulation-aided field setting and imaging, NO change is required . Additionally, the use of ultrasound visualization and targeted assessment allows the patient to be able to see their cancer(s) progress, encouraging patient to complete and maintain compliance through full course of radiotherapy. Per Dr. Martinez, continued ultrasound guidance and therapeutic radiology simulation-aided field setting verification per fraction is required for field placement, measurement of tumor depth, progress, and acute effect monitoring. \\n

## 2021-12-16 NOTE — PROCEDURE: SUPERFICIAL RADIATION TREATMENT
Bill For Simulation And Treatment Device Design: Yes - (Intermediate Simulation: 49417) Bill For Simulation And Treatment Device Design: Yes - (Intermediate Simulation: 50457)

## 2021-12-19 ENCOUNTER — APPOINTMENT (OUTPATIENT)
Dept: URBAN - METROPOLITAN AREA CLINIC 268 | Age: 76
Setting detail: DERMATOLOGY
End: 2022-01-11

## 2021-12-19 PROCEDURE — 77336 RADIATION PHYSICS CONSULT: CPT

## 2021-12-20 ENCOUNTER — APPOINTMENT (OUTPATIENT)
Dept: URBAN - METROPOLITAN AREA CLINIC 268 | Age: 76
Setting detail: DERMATOLOGY
End: 2022-01-11

## 2021-12-20 PROBLEM — C44.311 BASAL CELL CARCINOMA OF SKIN OF NOSE: Status: ACTIVE | Noted: 2021-12-20

## 2021-12-20 PROBLEM — C44.319 BASAL CELL CARCINOMA OF SKIN OF OTHER PARTS OF FACE: Status: ACTIVE | Noted: 2021-12-20

## 2021-12-20 PROCEDURE — 77401 RADIATION TX DELIVERY SUPFC: CPT

## 2021-12-20 PROCEDURE — 77427 RADIATION TX MANAGEMENT X5: CPT | Mod: 59

## 2021-12-20 PROCEDURE — OTHER SUPERFICIAL RADIATION TREATMENT: OTHER

## 2021-12-20 PROCEDURE — 77280 THER RAD SIMULAJ FIELD SMPL: CPT

## 2021-12-20 PROCEDURE — G6001 ECHO GUIDANCE RADIOTHERAPY: HCPCS

## 2021-12-20 PROCEDURE — OTHER TREATMENT REGIMEN: OTHER

## 2021-12-20 NOTE — PROCEDURE: SUPERFICIAL RADIATION TREATMENT
Problem: Safety  Goal: Will remain free from falls  Outcome: PROGRESSING AS EXPECTED  Note: 1:1 safety sitter at bedside. Bed locked and in lowest position.     Problem: Pain Management  Goal: Pain level will decrease to patient's comfort goal  Outcome: PROGRESSING AS EXPECTED      Energy (Include Units): 100

## 2021-12-20 NOTE — PROCEDURE: SUPERFICIAL RADIATION TREATMENT
Bill For Simulation And Treatment Device Design: Yes - (Intermediate Simulation: 85861) Bill For Simulation And Treatment Device Design: Yes - (Intermediate Simulation: 55029)

## 2021-12-20 NOTE — PROCEDURE: TREATMENT REGIMEN
Plan: This patient has been treated today with image guided superficial radiation therapy for non-melanoma skin cancer. Written informed consent has been previously obtained from this patient for this treatment. This consent is documented in the patient’s chart. The patient gave verbal consent to continue treatment today. The patient was treated with a specific radiation dose and setup as prescribed by the provider listed on this visit note. A Radiation Therapist performed administration of radiation under supervision of provider. The treatment parameters and cumulative dose are indicated above. Prior to administering the radiation, the patient underwent a verification therapeutic radiology simulation-aided field setting defining relevant normal and abnormal target anatomy and acquiring images with high frequency ultrasound in addition to data necessary developing optimal radiation treatment process for the patient. This process includes verification of the treatment port(s) and proper treatment positioning. All treatment ports were photographed within electronic medical record. The patient’s customized lead blocking along with gross tumor volume and margin was confirmed. Considering superficial radiotherapy is clinical in setup, this requires physician and radiation therapist to clarify location interest being treated against initial images, pathology and patient anatomy. Care was taken ensuring fields treated were geometrically accurate and properly positioned using therapeutic radiology simulation-aided field setting verification per fraction. This process is also utilized to determine if any prescription or setup changes are necessary. These steps are therefore medically necessary ensuring safe and effective administration of radiation. Ongoing therapeutic radiology simulation-aided field setting verification is ordered throughout course of therapy.\\nA high frequency ultrasound image was acquired today for two-dimensional evaluation of the tumor volume and response to treatment, in addition to geometric accuracy of field placement. US depth is  1.46 mm, which is 0.33 mm in difference from previous imaging. The field placement and ultrasound imaging, per fraction, is separate and distinct from the initial simulation, and is an important task in providing safe administration of superficial radiation therapy. Physician evaluation of the ultrasound tumor depth will be ongoing throughout the course of treatment and is deemed medically necessary in order to ensure the efficacy of treatment and any necessary changes. Today’s image was evaluated for determination of continuation of treatment with the current plan or with necessary changes as appropriate. According to provider review of verification therapeutic radiology simulation-aided field setting and imaging, NO change necessary. Additionally, the use of ultrasound visualization and targeted assessment allows the patient to be able to see their cancer(s) progress, encouraging patient to complete and maintain compliance through full course of radiotherapy. Per Dr. Martinez, continued ultrasound guidance and therapeutic radiology simulation-aided field setting verification per fraction is required for field placement, measurement of tumor depth, progress, and acute effect monitoring. \\n\\nPer the request of Dr. Martinez, this patient was seen today for Superficial Radiation Therapy management (CPT® 13195). A high frequency ultrasound image was acquired today for three-dimensional evaluation of tumor volume and response to treatment, in addition to geometric accuracy of field placement (CPT® ). Physician evaluation of the ultrasound tumor depth will be ongoing through the course of treatment and is deemed medically necessary ensuring efficacy of treatment. Today’s image and setup was evaluated determining continuation of treatment with the current plan, or necessary changes as appropriate. All appropriate custom blocking and treatment parameters were verified by the Radiation therapist according to initial simulation. \\n\\nPer Dr. Martinez, continued daily US guidance is necessary for measurement of tumor depth, progress and edema monitoring.\\nEvaluation for response and reaction to SRT based on current fraction and cumulative dose with a visual inspection and ultrasound demonstrates a normal expected response. Superficial Radiation Therapy will continue as planned.\\n\\nThe patient is undergoing superficial radiation therapy for skin cancer and presents for weekly evaluation and management. Per protocol and as documented on the flow sheet, the patient was questioned as to subjective redness, pruritus, pain, drainage, fatigue, or any other symptoms. Objectively, the radiation area was evaluated with regards to erythema, atrophy, scale, crusting, erosion, ulceration, edema, purpura, tenderness, warmth, drainage, and any other findings. The plan was extensively reviewed including dose, and dosing schedule. The simulation and clinical setup was also reviewed as was the external and any internal shields and based on this review the appropriateness and sufficiency of treatment was determined.\\nUS image was performed. US depth is 1.46 mm.\\nPer Dr. Martinez, continued daily US guidance and simulation is required for field placement, measurement of tumor depth, progress and edema monitoring. \\n\\nSubjective:\\nRedness\\nObjective:\\nErythema\\nAssessment:\\nAppropriate reaction\\nPlan: \\nContinue current treatment regimen\\nComments:\\nRTOG 1\\n\\n Plan: This patient has been treated today with image guided superficial radiation therapy for non-melanoma skin cancer. Written informed consent has been previously obtained from this patient for this treatment. This consent is documented in the patient’s chart. The patient gave verbal consent to continue treatment today. The patient was treated with a specific radiation dose and setup as prescribed by the provider listed on this visit note. A Radiation Therapist performed administration of radiation under supervision of provider. The treatment parameters and cumulative dose are indicated above. Prior to administering the radiation, the patient underwent a verification therapeutic radiology simulation-aided field setting defining relevant normal and abnormal target anatomy and acquiring images with high frequency ultrasound in addition to data necessary developing optimal radiation treatment process for the patient. This process includes verification of the treatment port(s) and proper treatment positioning. All treatment ports were photographed within electronic medical record. The patient’s customized lead blocking along with gross tumor volume and margin was confirmed. Considering superficial radiotherapy is clinical in setup, this requires physician and radiation therapist to clarify location interest being treated against initial images, pathology and patient anatomy. Care was taken ensuring fields treated were geometrically accurate and properly positioned using therapeutic radiology simulation-aided field setting verification per fraction. This process is also utilized to determine if any prescription or setup changes are necessary. These steps are therefore medically necessary ensuring safe and effective administration of radiation. Ongoing therapeutic radiology simulation-aided field setting verification is ordered throughout course of therapy.\\nA high frequency ultrasound image was acquired today for two-dimensional evaluation of the tumor volume and response to treatment, in addition to geometric accuracy of field placement. US depth is  1.46 mm, which is 0.33 mm in difference from previous imaging. The field placement and ultrasound imaging, per fraction, is separate and distinct from the initial simulation, and is an important task in providing safe administration of superficial radiation therapy. Physician evaluation of the ultrasound tumor depth will be ongoing throughout the course of treatment and is deemed medically necessary in order to ensure the efficacy of treatment and any necessary changes. Today’s image was evaluated for determination of continuation of treatment with the current plan or with necessary changes as appropriate. According to provider review of verification therapeutic radiology simulation-aided field setting and imaging, NO change necessary. Additionally, the use of ultrasound visualization and targeted assessment allows the patient to be able to see their cancer(s) progress, encouraging patient to complete and maintain compliance through full course of radiotherapy. Per Dr. Martinez, continued ultrasound guidance and therapeutic radiology simulation-aided field setting verification per fraction is required for field placement, measurement of tumor depth, progress, and acute effect monitoring. \\n\\nPer the request of Dr. Martinez, this patient was seen today for Superficial Radiation Therapy management (CPT® 95949). A high frequency ultrasound image was acquired today for three-dimensional evaluation of tumor volume and response to treatment, in addition to geometric accuracy of field placement (CPT® ). Physician evaluation of the ultrasound tumor depth will be ongoing through the course of treatment and is deemed medically necessary ensuring efficacy of treatment. Today’s image and setup was evaluated determining continuation of treatment with the current plan, or necessary changes as appropriate. All appropriate custom blocking and treatment parameters were verified by the Radiation therapist according to initial simulation. \\n\\nPer Dr. Martinez, continued daily US guidance is necessary for measurement of tumor depth, progress and edema monitoring.\\nEvaluation for response and reaction to SRT based on current fraction and cumulative dose with a visual inspection and ultrasound demonstrates a normal expected response. Superficial Radiation Therapy will continue as planned.\\n\\nThe patient is undergoing superficial radiation therapy for skin cancer and presents for weekly evaluation and management. Per protocol and as documented on the flow sheet, the patient was questioned as to subjective redness, pruritus, pain, drainage, fatigue, or any other symptoms. Objectively, the radiation area was evaluated with regards to erythema, atrophy, scale, crusting, erosion, ulceration, edema, purpura, tenderness, warmth, drainage, and any other findings. The plan was extensively reviewed including dose, and dosing schedule. The simulation and clinical setup was also reviewed as was the external and any internal shields and based on this review the appropriateness and sufficiency of treatment was determined.\\nUS image was performed. US depth is 1.46 mm.\\nPer Dr. Martinez, continued daily US guidance and simulation is required for field placement, measurement of tumor depth, progress and edema monitoring. \\n\\nSubjective:\\nRedness\\nObjective:\\nErythema\\nAssessment:\\nAppropriate reaction\\nPlan: \\nContinue current treatment regimen\\nComments:\\nRTOG 1\\n\\n

## 2021-12-20 NOTE — PROCEDURE: TREATMENT REGIMEN
Plan: This patient has been treated today with image guided superficial radiation therapy for non-melanoma skin cancer. Written informed consent has been previously obtained from this patient for this treatment. This consent is documented in the patient’s chart. The patient gave verbal consent to continue treatment today. The patient was treated with a specific radiation dose and setup as prescribed by the provider listed on this visit note. A Radiation Therapist performed administration of radiation under supervision of provider. The treatment parameters and cumulative dose are indicated above. Prior to administering the radiation, the patient underwent a verification therapeutic radiology simulation-aided field setting defining relevant normal and abnormal target anatomy and acquiring images with high frequency ultrasound in addition to data necessary developing optimal radiation treatment process for the patient. This process includes verification of the treatment port(s) and proper treatment positioning. All treatment ports were photographed within electronic medical record. The patient’s customized lead blocking along with gross tumor volume and margin was confirmed. Considering superficial radiotherapy is clinical in setup, this requires physician and radiation therapist to clarify location interest being treated against initial images, pathology and patient anatomy. Care was taken ensuring fields treated were geometrically accurate and properly positioned using therapeutic radiology simulation-aided field setting verification per fraction. This process is also utilized to determine if any prescription or setup changes are necessary. These steps are therefore medically necessary ensuring safe and effective administration of radiation. Ongoing therapeutic radiology simulation-aided field setting verification is ordered throughout course of therapy.\\nA high frequency ultrasound image was acquired today for two-dimensional evaluation of the tumor volume and response to treatment, in addition to geometric accuracy of field placement. US depth is 1.46 mm, which is 0.03 mm in difference from previous imaging. The field placement and ultrasound imaging, per fraction, is separate and distinct from the initial simulation, and is an important task in providing safe administration of superficial radiation therapy. Physician evaluation of the ultrasound tumor depth will be ongoing throughout the course of treatment and is deemed medically necessary in order to ensure the efficacy of treatment and any necessary changes. Today’s image was evaluated for determination of continuation of treatment with the current plan or with necessary changes as appropriate. According to provider review of verification therapeutic radiology simulation-aided field setting and imaging, NO change is required . Additionally, the use of ultrasound visualization and targeted assessment allows the patient to be able to see their cancer(s) progress, encouraging patient to complete and maintain compliance through full course of radiotherapy. Per Dr. Martinez, continued ultrasound guidance and therapeutic radiology simulation-aided field setting verification per fraction is required for field placement, measurement of tumor depth, progress, and acute effect monitoring. \\n\\nPer the request of Dr. Martinez, this patient was seen today for Superficial Radiation Therapy management (CPT® 73617). A high frequency ultrasound image was acquired today for three-dimensional evaluation of tumor volume and response to treatment, in addition to geometric accuracy of field placement (CPT® ). Physician evaluation of the ultrasound tumor depth will be ongoing through the course of treatment and is deemed medically necessary ensuring efficacy of treatment. Today’s image and setup was evaluated determining continuation of treatment with the current plan, or necessary changes as appropriate. All appropriate custom blocking and treatment parameters were verified by the Radiation therapist according to initial simulation. \\n\\nPer Dr. Martinez, continued daily US guidance is necessary for measurement of tumor depth, progress and edema monitoring.\\nEvaluation for response and reaction to SRT based on current fraction and cumulative dose with a visual inspection and ultrasound demonstrates a normal expected response. Superficial Radiation Therapy will continue as planned.\\n\\nThe patient is undergoing superficial radiation therapy for skin cancer and presents for weekly evaluation and management. Per protocol and as documented on the flow sheet, the patient was questioned as to subjective redness, pruritus, pain, drainage, fatigue, or any other symptoms. Objectively, the radiation area was evaluated with regards to erythema, atrophy, scale, crusting, erosion, ulceration, edema, purpura, tenderness, warmth, drainage, and any other findings. The plan was extensively reviewed including dose, and dosing schedule. The simulation and clinical setup was also reviewed as was the external and any internal shields and based on this review the appropriateness and sufficiency of treatment was determined.\\nUS image was performed. US depth is 1.46 mm.\\nPer Dr. Martinez, continued daily US guidance and simulation is required for field placement, measurement of tumor depth, progress and edema monitoring. \\n\\nSubjective:\\nNo visible changes\\nObjective:\\nNo visible changes\\nAssessment:\\nAppropriate reaction\\nPlan: \\nContinue current treatment regimen\\nComments:\\nRTOG 0\\n\\n Plan: This patient has been treated today with image guided superficial radiation therapy for non-melanoma skin cancer. Written informed consent has been previously obtained from this patient for this treatment. This consent is documented in the patient’s chart. The patient gave verbal consent to continue treatment today. The patient was treated with a specific radiation dose and setup as prescribed by the provider listed on this visit note. A Radiation Therapist performed administration of radiation under supervision of provider. The treatment parameters and cumulative dose are indicated above. Prior to administering the radiation, the patient underwent a verification therapeutic radiology simulation-aided field setting defining relevant normal and abnormal target anatomy and acquiring images with high frequency ultrasound in addition to data necessary developing optimal radiation treatment process for the patient. This process includes verification of the treatment port(s) and proper treatment positioning. All treatment ports were photographed within electronic medical record. The patient’s customized lead blocking along with gross tumor volume and margin was confirmed. Considering superficial radiotherapy is clinical in setup, this requires physician and radiation therapist to clarify location interest being treated against initial images, pathology and patient anatomy. Care was taken ensuring fields treated were geometrically accurate and properly positioned using therapeutic radiology simulation-aided field setting verification per fraction. This process is also utilized to determine if any prescription or setup changes are necessary. These steps are therefore medically necessary ensuring safe and effective administration of radiation. Ongoing therapeutic radiology simulation-aided field setting verification is ordered throughout course of therapy.\\nA high frequency ultrasound image was acquired today for two-dimensional evaluation of the tumor volume and response to treatment, in addition to geometric accuracy of field placement. US depth is 1.46 mm, which is 0.03 mm in difference from previous imaging. The field placement and ultrasound imaging, per fraction, is separate and distinct from the initial simulation, and is an important task in providing safe administration of superficial radiation therapy. Physician evaluation of the ultrasound tumor depth will be ongoing throughout the course of treatment and is deemed medically necessary in order to ensure the efficacy of treatment and any necessary changes. Today’s image was evaluated for determination of continuation of treatment with the current plan or with necessary changes as appropriate. According to provider review of verification therapeutic radiology simulation-aided field setting and imaging, NO change is required . Additionally, the use of ultrasound visualization and targeted assessment allows the patient to be able to see their cancer(s) progress, encouraging patient to complete and maintain compliance through full course of radiotherapy. Per Dr. Martinez, continued ultrasound guidance and therapeutic radiology simulation-aided field setting verification per fraction is required for field placement, measurement of tumor depth, progress, and acute effect monitoring. \\n\\nPer the request of Dr. Martinez, this patient was seen today for Superficial Radiation Therapy management (CPT® 11320). A high frequency ultrasound image was acquired today for three-dimensional evaluation of tumor volume and response to treatment, in addition to geometric accuracy of field placement (CPT® ). Physician evaluation of the ultrasound tumor depth will be ongoing through the course of treatment and is deemed medically necessary ensuring efficacy of treatment. Today’s image and setup was evaluated determining continuation of treatment with the current plan, or necessary changes as appropriate. All appropriate custom blocking and treatment parameters were verified by the Radiation therapist according to initial simulation. \\n\\nPer Dr. Martinez, continued daily US guidance is necessary for measurement of tumor depth, progress and edema monitoring.\\nEvaluation for response and reaction to SRT based on current fraction and cumulative dose with a visual inspection and ultrasound demonstrates a normal expected response. Superficial Radiation Therapy will continue as planned.\\n\\nThe patient is undergoing superficial radiation therapy for skin cancer and presents for weekly evaluation and management. Per protocol and as documented on the flow sheet, the patient was questioned as to subjective redness, pruritus, pain, drainage, fatigue, or any other symptoms. Objectively, the radiation area was evaluated with regards to erythema, atrophy, scale, crusting, erosion, ulceration, edema, purpura, tenderness, warmth, drainage, and any other findings. The plan was extensively reviewed including dose, and dosing schedule. The simulation and clinical setup was also reviewed as was the external and any internal shields and based on this review the appropriateness and sufficiency of treatment was determined.\\nUS image was performed. US depth is 1.46 mm.\\nPer Dr. Martinez, continued daily US guidance and simulation is required for field placement, measurement of tumor depth, progress and edema monitoring. \\n\\nSubjective:\\nNo visible changes\\nObjective:\\nNo visible changes\\nAssessment:\\nAppropriate reaction\\nPlan: \\nContinue current treatment regimen\\nComments:\\nRTOG 0\\n\\n

## 2021-12-21 ENCOUNTER — APPOINTMENT (OUTPATIENT)
Dept: URBAN - METROPOLITAN AREA CLINIC 268 | Age: 76
Setting detail: DERMATOLOGY
End: 2021-12-21

## 2021-12-21 PROBLEM — C44.311 BASAL CELL CARCINOMA OF SKIN OF NOSE: Status: ACTIVE | Noted: 2021-12-21

## 2021-12-21 PROBLEM — C44.319 BASAL CELL CARCINOMA OF SKIN OF OTHER PARTS OF FACE: Status: ACTIVE | Noted: 2021-12-21

## 2021-12-21 PROCEDURE — G6001 ECHO GUIDANCE RADIOTHERAPY: HCPCS

## 2021-12-21 PROCEDURE — OTHER SUPERFICIAL RADIATION TREATMENT: OTHER

## 2021-12-21 PROCEDURE — OTHER TREATMENT REGIMEN: OTHER

## 2021-12-21 PROCEDURE — 77280 THER RAD SIMULAJ FIELD SMPL: CPT

## 2021-12-21 PROCEDURE — 77401 RADIATION TX DELIVERY SUPFC: CPT

## 2021-12-21 NOTE — PROCEDURE: SUPERFICIAL RADIATION TREATMENT
Bill For Simulation And Treatment Device Design: Yes - (Intermediate Simulation: 65955) Bill For Simulation And Treatment Device Design: Yes - (Intermediate Simulation: 79389)

## 2021-12-21 NOTE — PROCEDURE: TREATMENT REGIMEN
Detail Level: Zone
Plan: This patient has been treated today with image guided superficial radiation therapy for non-melanoma skin cancer. Written informed consent has been previously obtained from this patient for this treatment. This consent is documented in the patient’s chart. The patient gave verbal consent to continue treatment today. The patient was treated with a specific radiation dose and setup as prescribed by the provider listed on this visit note. A Radiation Therapist performed administration of radiation under supervision of provider. The treatment parameters and cumulative dose are indicated above. Prior to administering the radiation, the patient underwent a verification therapeutic radiology simulation-aided field setting defining relevant normal and abnormal target anatomy and acquiring images with high frequency ultrasound in addition to data necessary developing optimal radiation treatment process for the patient. This process includes verification of the treatment port(s) and proper treatment positioning. All treatment ports were photographed within electronic medical record. The patient’s customized lead blocking along with gross tumor volume and margin was confirmed. Considering superficial radiotherapy is clinical in setup, this requires physician and radiation therapist to clarify location interest being treated against initial images, pathology and patient anatomy. Care was taken ensuring fields treated were geometrically accurate and properly positioned using therapeutic radiology simulation-aided field setting verification per fraction. This process is also utilized to determine if any prescription or setup changes are necessary. These steps are therefore medically necessary ensuring safe and effective administration of radiation. Ongoing therapeutic radiology simulation-aided field setting verification is ordered throughout course of therapy.\\nA high frequency ultrasound image was acquired today for two-dimensional evaluation of the tumor volume and response to treatment, in addition to geometric accuracy of field placement. US depth is 1.39 mm, which is 0.07 mm in difference from previous imaging. The field placement and ultrasound imaging, per fraction, is separate and distinct from the initial simulation, and is an important task in providing safe administration of superficial radiation therapy. Physician evaluation of the ultrasound tumor depth will be ongoing throughout the course of treatment and is deemed medically necessary in order to ensure the efficacy of treatment and any necessary changes. Today’s image was evaluated for determination of continuation of treatment with the current plan or with necessary changes as appropriate. According to provider review of verification therapeutic radiology simulation-aided field setting and imaging, NO change is required . Additionally, the use of ultrasound visualization and targeted assessment allows the patient to be able to see their cancer(s) progress, encouraging patient to complete and maintain compliance through full course of radiotherapy. Per Dr. Martinez, continued ultrasound guidance and therapeutic radiology simulation-aided field setting verification per fraction is required for field placement, measurement of tumor depth, progress, and acute effect monitoring. \\n\\n
Plan: This patient has been treated today with image guided superficial radiation therapy for non-melanoma skin cancer. Written informed consent has been previously obtained from this patient for this treatment. This consent is documented in the patient’s chart. The patient gave verbal consent to continue treatment today. The patient was treated with a specific radiation dose and setup as prescribed by the provider listed on this visit note. A Radiation Therapist performed administration of radiation under supervision of provider. The treatment parameters and cumulative dose are indicated above. Prior to administering the radiation, the patient underwent a verification therapeutic radiology simulation-aided field setting defining relevant normal and abnormal target anatomy and acquiring images with high frequency ultrasound in addition to data necessary developing optimal radiation treatment process for the patient. This process includes verification of the treatment port(s) and proper treatment positioning. All treatment ports were photographed within electronic medical record. The patient’s customized lead blocking along with gross tumor volume and margin was confirmed. Considering superficial radiotherapy is clinical in setup, this requires physician and radiation therapist to clarify location interest being treated against initial images, pathology and patient anatomy. Care was taken ensuring fields treated were geometrically accurate and properly positioned using therapeutic radiology simulation-aided field setting verification per fraction. This process is also utilized to determine if any prescription or setup changes are necessary. These steps are therefore medically necessary ensuring safe and effective administration of radiation. Ongoing therapeutic radiology simulation-aided field setting verification is ordered throughout course of therapy.\\nA high frequency ultrasound image was acquired today for two-dimensional evaluation of the tumor volume and response to treatment, in addition to geometric accuracy of field placement. US depth is  1.05 mm, which is 0.41 mm in difference from previous imaging. The field placement and ultrasound imaging, per fraction, is separate and distinct from the initial simulation, and is an important task in providing safe administration of superficial radiation therapy. Physician evaluation of the ultrasound tumor depth will be ongoing throughout the course of treatment and is deemed medically necessary in order to ensure the efficacy of treatment and any necessary changes. Today’s image was evaluated for determination of continuation of treatment with the current plan or with necessary changes as appropriate. According to provider review of verification therapeutic radiology simulation-aided field setting and imaging, NO change necessary. Additionally, the use of ultrasound visualization and targeted assessment allows the patient to be able to see their cancer(s) progress, encouraging patient to complete and maintain compliance through full course of radiotherapy. Per Dr. Martinez, continued ultrasound guidance and therapeutic radiology simulation-aided field setting verification per fraction is required for field placement, measurement of tumor depth, progress, and acute effect monitoring. \\n\\n\\n\\n

## 2021-12-23 ENCOUNTER — APPOINTMENT (OUTPATIENT)
Dept: URBAN - METROPOLITAN AREA CLINIC 268 | Age: 76
Setting detail: DERMATOLOGY
End: 2021-12-27

## 2021-12-23 PROBLEM — C44.319 BASAL CELL CARCINOMA OF SKIN OF OTHER PARTS OF FACE: Status: ACTIVE | Noted: 2021-12-23

## 2021-12-23 PROBLEM — C44.311 BASAL CELL CARCINOMA OF SKIN OF NOSE: Status: ACTIVE | Noted: 2021-12-23

## 2021-12-23 PROCEDURE — 77401 RADIATION TX DELIVERY SUPFC: CPT

## 2021-12-23 PROCEDURE — OTHER TREATMENT REGIMEN: OTHER

## 2021-12-23 PROCEDURE — G6001 ECHO GUIDANCE RADIOTHERAPY: HCPCS

## 2021-12-23 PROCEDURE — 77280 THER RAD SIMULAJ FIELD SMPL: CPT

## 2021-12-23 PROCEDURE — OTHER SUPERFICIAL RADIATION TREATMENT: OTHER

## 2021-12-23 NOTE — PROCEDURE: SUPERFICIAL RADIATION TREATMENT
Bill For Simulation And Treatment Device Design: Yes - (Intermediate Simulation: 53232) Bill For Simulation And Treatment Device Design: Yes - (Intermediate Simulation: 18725)

## 2021-12-23 NOTE — PROCEDURE: TREATMENT REGIMEN
Plan: This patient has been treated today with image guided superficial radiation therapy for non-melanoma skin cancer. Written informed consent has been previously obtained from this patient for this treatment. This consent is documented in the patient’s chart. The patient gave verbal consent to continue treatment today. The patient was treated with a specific radiation dose and setup as prescribed by the provider listed on this visit note. A Radiation Therapist performed administration of radiation under supervision of provider. The treatment parameters and cumulative dose are indicated above. Prior to administering the radiation, the patient underwent a verification therapeutic radiology simulation-aided field setting defining relevant normal and abnormal target anatomy and acquiring images with high frequency ultrasound in addition to data necessary developing optimal radiation treatment process for the patient. This process includes verification of the treatment port(s) and proper treatment positioning. All treatment ports were photographed within electronic medical record. The patient’s customized lead blocking along with gross tumor volume and margin was confirmed. Considering superficial radiotherapy is clinical in setup, this requires physician and radiation therapist to clarify location interest being treated against initial images, pathology and patient anatomy. Care was taken ensuring fields treated were geometrically accurate and properly positioned using therapeutic radiology simulation-aided field setting verification per fraction. This process is also utilized to determine if any prescription or setup changes are necessary. These steps are therefore medically necessary ensuring safe and effective administration of radiation. Ongoing therapeutic radiology simulation-aided field setting verification is ordered throughout course of therapy.\\nA high frequency ultrasound image was acquired today for two-dimensional evaluation of the tumor volume and response to treatment, in addition to geometric accuracy of field placement. US depth is 1.66 mm, which is 0.27 mm in difference from previous imaging. The field placement and ultrasound imaging, per fraction, is separate and distinct from the initial simulation, and is an important task in providing safe administration of superficial radiation therapy. Physician evaluation of the ultrasound tumor depth will be ongoing throughout the course of treatment and is deemed medically necessary in order to ensure the efficacy of treatment and any necessary changes. Today’s image was evaluated for determination of continuation of treatment with the current plan or with necessary changes as appropriate. According to provider review of verification therapeutic radiology simulation-aided field setting and imaging, NO change is required . Additionally, the use of ultrasound visualization and targeted assessment allows the patient to be able to see their cancer(s) progress, encouraging patient to complete and maintain compliance through full course of radiotherapy. Per Dr. Martinez, continued ultrasound guidance and therapeutic radiology simulation-aided field setting verification per fraction is required for field placement, measurement of tumor depth, progress, and acute effect monitoring. \\n\\n
Detail Level: Zone
Plan: This patient has been treated today with image guided superficial radiation therapy for non-melanoma skin cancer. Written informed consent has been previously obtained from this patient for this treatment. This consent is documented in the patient’s chart. The patient gave verbal consent to continue treatment today. The patient was treated with a specific radiation dose and setup as prescribed by the provider listed on this visit note. A Radiation Therapist performed administration of radiation under supervision of provider. The treatment parameters and cumulative dose are indicated above. Prior to administering the radiation, the patient underwent a verification therapeutic radiology simulation-aided field setting defining relevant normal and abnormal target anatomy and acquiring images with high frequency ultrasound in addition to data necessary developing optimal radiation treatment process for the patient. This process includes verification of the treatment port(s) and proper treatment positioning. All treatment ports were photographed within electronic medical record. The patient’s customized lead blocking along with gross tumor volume and margin was confirmed. Considering superficial radiotherapy is clinical in setup, this requires physician and radiation therapist to clarify location interest being treated against initial images, pathology and patient anatomy. Care was taken ensuring fields treated were geometrically accurate and properly positioned using therapeutic radiology simulation-aided field setting verification per fraction. This process is also utilized to determine if any prescription or setup changes are necessary. These steps are therefore medically necessary ensuring safe and effective administration of radiation. Ongoing therapeutic radiology simulation-aided field setting verification is ordered throughout course of therapy.\\nA high frequency ultrasound image was acquired today for two-dimensional evaluation of the tumor volume and response to treatment, in addition to geometric accuracy of field placement. US depth is  1.15 mm, which is 0.10 mm in difference from previous imaging. The field placement and ultrasound imaging, per fraction, is separate and distinct from the initial simulation, and is an important task in providing safe administration of superficial radiation therapy. Physician evaluation of the ultrasound tumor depth will be ongoing throughout the course of treatment and is deemed medically necessary in order to ensure the efficacy of treatment and any necessary changes. Today’s image was evaluated for determination of continuation of treatment with the current plan or with necessary changes as appropriate. According to provider review of verification therapeutic radiology simulation-aided field setting and imaging, NO change necessary. Additionally, the use of ultrasound visualization and targeted assessment allows the patient to be able to see their cancer(s) progress, encouraging patient to complete and maintain compliance through full course of radiotherapy. Per Dr. Martinez, continued ultrasound guidance and therapeutic radiology simulation-aided field setting verification per fraction is required for field placement, measurement of tumor depth, progress, and acute effect monitoring. \\n\\n\\n\\n

## 2021-12-28 ENCOUNTER — APPOINTMENT (OUTPATIENT)
Dept: URBAN - METROPOLITAN AREA CLINIC 268 | Age: 76
Setting detail: DERMATOLOGY
End: 2022-01-04

## 2021-12-28 PROBLEM — C44.311 BASAL CELL CARCINOMA OF SKIN OF NOSE: Status: ACTIVE | Noted: 2021-12-28

## 2021-12-28 PROBLEM — C44.319 BASAL CELL CARCINOMA OF SKIN OF OTHER PARTS OF FACE: Status: ACTIVE | Noted: 2021-12-28

## 2021-12-28 PROCEDURE — OTHER TREATMENT REGIMEN: OTHER

## 2021-12-28 PROCEDURE — OTHER SUPERFICIAL RADIATION TREATMENT: OTHER

## 2021-12-28 PROCEDURE — 77401 RADIATION TX DELIVERY SUPFC: CPT

## 2021-12-28 PROCEDURE — 77280 THER RAD SIMULAJ FIELD SMPL: CPT

## 2021-12-28 PROCEDURE — G6001 ECHO GUIDANCE RADIOTHERAPY: HCPCS

## 2021-12-28 NOTE — PROCEDURE: SUPERFICIAL RADIATION TREATMENT
Bill For Simulation And Treatment Device Design: Yes - (Intermediate Simulation: 94270) Bill For Simulation And Treatment Device Design: Yes - (Intermediate Simulation: 36773)

## 2021-12-28 NOTE — PROCEDURE: TREATMENT REGIMEN
Plan: This patient has been treated today with image guided superficial radiation therapy for non-melanoma skin cancer. Written informed consent has been previously obtained from this patient for this treatment. This consent is documented in the patient’s chart. The patient gave verbal consent to continue treatment today. The patient was treated with a specific radiation dose and setup as prescribed by the provider listed on this visit note. A Radiation Therapist performed administration of radiation under supervision of provider. The treatment parameters and cumulative dose are indicated above. Prior to administering the radiation, the patient underwent a verification therapeutic radiology simulation-aided field setting defining relevant normal and abnormal target anatomy and acquiring images with high frequency ultrasound in addition to data necessary developing optimal radiation treatment process for the patient. This process includes verification of the treatment port(s) and proper treatment positioning. All treatment ports were photographed within electronic medical record. The patient’s customized lead blocking along with gross tumor volume and margin was confirmed. Considering superficial radiotherapy is clinical in setup, this requires physician and radiation therapist to clarify location interest being treated against initial images, pathology and patient anatomy. Care was taken ensuring fields treated were geometrically accurate and properly positioned using therapeutic radiology simulation-aided field setting verification per fraction. This process is also utilized to determine if any prescription or setup changes are necessary. These steps are therefore medically necessary ensuring safe and effective administration of radiation. Ongoing therapeutic radiology simulation-aided field setting verification is ordered throughout course of therapy.\\nA high frequency ultrasound image was acquired today for two-dimensional evaluation of the tumor volume and response to treatment, in addition to geometric accuracy of field placement. US depth is  1.36 mm, which is 0.21 mm in difference from previous imaging. The field placement and ultrasound imaging, per fraction, is separate and distinct from the initial simulation, and is an important task in providing safe administration of superficial radiation therapy. Physician evaluation of the ultrasound tumor depth will be ongoing throughout the course of treatment and is deemed medically necessary in order to ensure the efficacy of treatment and any necessary changes. Today’s image was evaluated for determination of continuation of treatment with the current plan or with necessary changes as appropriate. According to provider review of verification therapeutic radiology simulation-aided field setting and imaging, NO change necessary. Additionally, the use of ultrasound visualization and targeted assessment allows the patient to be able to see their cancer(s) progress, encouraging patient to complete and maintain compliance through full course of radiotherapy. Per Dr. Martinez, continued ultrasound guidance and therapeutic radiology simulation-aided field setting verification per fraction is required for field placement, measurement of tumor depth, progress, and acute effect monitoring. \\n\\n\\n\\n
Detail Level: Zone
Plan: This patient has been treated today with image guided superficial radiation therapy for non-melanoma skin cancer. Written informed consent has been previously obtained from this patient for this treatment. This consent is documented in the patient’s chart. The patient gave verbal consent to continue treatment today. The patient was treated with a specific radiation dose and setup as prescribed by the provider listed on this visit note. A Radiation Therapist performed administration of radiation under supervision of provider. The treatment parameters and cumulative dose are indicated above. Prior to administering the radiation, the patient underwent a verification therapeutic radiology simulation-aided field setting defining relevant normal and abnormal target anatomy and acquiring images with high frequency ultrasound in addition to data necessary developing optimal radiation treatment process for the patient. This process includes verification of the treatment port(s) and proper treatment positioning. All treatment ports were photographed within electronic medical record. The patient’s customized lead blocking along with gross tumor volume and margin was confirmed. Considering superficial radiotherapy is clinical in setup, this requires physician and radiation therapist to clarify location interest being treated against initial images, pathology and patient anatomy. Care was taken ensuring fields treated were geometrically accurate and properly positioned using therapeutic radiology simulation-aided field setting verification per fraction. This process is also utilized to determine if any prescription or setup changes are necessary. These steps are therefore medically necessary ensuring safe and effective administration of radiation. Ongoing therapeutic radiology simulation-aided field setting verification is ordered throughout course of therapy.\\nA high frequency ultrasound image was acquired today for two-dimensional evaluation of the tumor volume and response to treatment, in addition to geometric accuracy of field placement. US depth is 1.65 mm, which is 0.01 mm in difference from previous imaging. The field placement and ultrasound imaging, per fraction, is separate and distinct from the initial simulation, and is an important task in providing safe administration of superficial radiation therapy. Physician evaluation of the ultrasound tumor depth will be ongoing throughout the course of treatment and is deemed medically necessary in order to ensure the efficacy of treatment and any necessary changes. Today’s image was evaluated for determination of continuation of treatment with the current plan or with necessary changes as appropriate. According to provider review of verification therapeutic radiology simulation-aided field setting and imaging, NO change is required . Additionally, the use of ultrasound visualization and targeted assessment allows the patient to be able to see their cancer(s) progress, encouraging patient to complete and maintain compliance through full course of radiotherapy. Per Dr. Martinez, continued ultrasound guidance and therapeutic radiology simulation-aided field setting verification per fraction is required for field placement, measurement of tumor depth, progress, and acute effect monitoring. \\n\\n

## 2021-12-29 ENCOUNTER — APPOINTMENT (OUTPATIENT)
Dept: URBAN - METROPOLITAN AREA CLINIC 268 | Age: 76
Setting detail: DERMATOLOGY
End: 2022-01-04

## 2021-12-29 PROBLEM — C44.319 BASAL CELL CARCINOMA OF SKIN OF OTHER PARTS OF FACE: Status: ACTIVE | Noted: 2021-12-29

## 2021-12-29 PROBLEM — C44.311 BASAL CELL CARCINOMA OF SKIN OF NOSE: Status: ACTIVE | Noted: 2021-12-29

## 2021-12-29 PROCEDURE — 77280 THER RAD SIMULAJ FIELD SMPL: CPT

## 2021-12-29 PROCEDURE — OTHER SUPERFICIAL RADIATION TREATMENT: OTHER

## 2021-12-29 PROCEDURE — OTHER TREATMENT REGIMEN: OTHER

## 2021-12-29 PROCEDURE — G6001 ECHO GUIDANCE RADIOTHERAPY: HCPCS

## 2021-12-29 PROCEDURE — 77401 RADIATION TX DELIVERY SUPFC: CPT

## 2021-12-29 NOTE — PROCEDURE: SUPERFICIAL RADIATION TREATMENT
Bill For Simulation And Treatment Device Design: Yes - (Intermediate Simulation: 41463) Bill For Simulation And Treatment Device Design: Yes - (Intermediate Simulation: 42100)

## 2021-12-29 NOTE — PROCEDURE: TREATMENT REGIMEN
Detail Level: Zone
Plan: This patient has been treated today with image guided superficial radiation therapy for non-melanoma skin cancer. Written informed consent has been previously obtained from this patient for this treatment. This consent is documented in the patient’s chart. The patient gave verbal consent to continue treatment today. The patient was treated with a specific radiation dose and setup as prescribed by the provider listed on this visit note. A Radiation Therapist performed administration of radiation under supervision of provider. The treatment parameters and cumulative dose are indicated above. Prior to administering the radiation, the patient underwent a verification therapeutic radiology simulation-aided field setting defining relevant normal and abnormal target anatomy and acquiring images with high frequency ultrasound in addition to data necessary developing optimal radiation treatment process for the patient. This process includes verification of the treatment port(s) and proper treatment positioning. All treatment ports were photographed within electronic medical record. The patient’s customized lead blocking along with gross tumor volume and margin was confirmed. Considering superficial radiotherapy is clinical in setup, this requires physician and radiation therapist to clarify location interest being treated against initial images, pathology and patient anatomy. Care was taken ensuring fields treated were geometrically accurate and properly positioned using therapeutic radiology simulation-aided field setting verification per fraction. This process is also utilized to determine if any prescription or setup changes are necessary. These steps are therefore medically necessary ensuring safe and effective administration of radiation. Ongoing therapeutic radiology simulation-aided field setting verification is ordered throughout course of therapy.\\nA high frequency ultrasound image was acquired today for two-dimensional evaluation of the tumor volume and response to treatment, in addition to geometric accuracy of field placement. US depth is 1.50 mm, which is 0.15 mm in difference from previous imaging. The field placement and ultrasound imaging, per fraction, is separate and distinct from the initial simulation, and is an important task in providing safe administration of superficial radiation therapy. Physician evaluation of the ultrasound tumor depth will be ongoing throughout the course of treatment and is deemed medically necessary in order to ensure the efficacy of treatment and any necessary changes. Today’s image was evaluated for determination of continuation of treatment with the current plan or with necessary changes as appropriate. According to provider review of verification therapeutic radiology simulation-aided field setting and imaging, NO change is required . Additionally, the use of ultrasound visualization and targeted assessment allows the patient to be able to see their cancer(s) progress, encouraging patient to complete and maintain compliance through full course of radiotherapy. Per Dr. Martinez, continued ultrasound guidance and therapeutic radiology simulation-aided field setting verification per fraction is required for field placement, measurement of tumor depth, progress, and acute effect monitoring. \\n\\n
Plan: This patient has been treated today with image guided superficial radiation therapy for non-melanoma skin cancer. Written informed consent has been previously obtained from this patient for this treatment. This consent is documented in the patient’s chart. The patient gave verbal consent to continue treatment today. The patient was treated with a specific radiation dose and setup as prescribed by the provider listed on this visit note. A Radiation Therapist performed administration of radiation under supervision of provider. The treatment parameters and cumulative dose are indicated above. Prior to administering the radiation, the patient underwent a verification therapeutic radiology simulation-aided field setting defining relevant normal and abnormal target anatomy and acquiring images with high frequency ultrasound in addition to data necessary developing optimal radiation treatment process for the patient. This process includes verification of the treatment port(s) and proper treatment positioning. All treatment ports were photographed within electronic medical record. The patient’s customized lead blocking along with gross tumor volume and margin was confirmed. Considering superficial radiotherapy is clinical in setup, this requires physician and radiation therapist to clarify location interest being treated against initial images, pathology and patient anatomy. Care was taken ensuring fields treated were geometrically accurate and properly positioned using therapeutic radiology simulation-aided field setting verification per fraction. This process is also utilized to determine if any prescription or setup changes are necessary. These steps are therefore medically necessary ensuring safe and effective administration of radiation. Ongoing therapeutic radiology simulation-aided field setting verification is ordered throughout course of therapy.\\nA high frequency ultrasound image was acquired today for two-dimensional evaluation of the tumor volume and response to treatment, in addition to geometric accuracy of field placement. US depth is  1.78 mm, which is 0.42 mm in difference from previous imaging. The field placement and ultrasound imaging, per fraction, is separate and distinct from the initial simulation, and is an important task in providing safe administration of superficial radiation therapy. Physician evaluation of the ultrasound tumor depth will be ongoing throughout the course of treatment and is deemed medically necessary in order to ensure the efficacy of treatment and any necessary changes. Today’s image was evaluated for determination of continuation of treatment with the current plan or with necessary changes as appropriate. According to provider review of verification therapeutic radiology simulation-aided field setting and imaging, NO change necessary. Additionally, the use of ultrasound visualization and targeted assessment allows the patient to be able to see their cancer(s) progress, encouraging patient to complete and maintain compliance through full course of radiotherapy. Per Dr. Martinez, continued ultrasound guidance and therapeutic radiology simulation-aided field setting verification per fraction is required for field placement, measurement of tumor depth, progress, and acute effect monitoring. \\n\\n\\n\\n

## 2021-12-30 ENCOUNTER — APPOINTMENT (OUTPATIENT)
Dept: URBAN - METROPOLITAN AREA CLINIC 268 | Age: 76
Setting detail: DERMATOLOGY
End: 2022-01-04

## 2021-12-30 PROBLEM — C44.311 BASAL CELL CARCINOMA OF SKIN OF NOSE: Status: ACTIVE | Noted: 2021-12-30

## 2021-12-30 PROBLEM — C44.319 BASAL CELL CARCINOMA OF SKIN OF OTHER PARTS OF FACE: Status: ACTIVE | Noted: 2021-12-30

## 2021-12-30 PROCEDURE — G6001 ECHO GUIDANCE RADIOTHERAPY: HCPCS

## 2021-12-30 PROCEDURE — 77280 THER RAD SIMULAJ FIELD SMPL: CPT

## 2021-12-30 PROCEDURE — OTHER TREATMENT REGIMEN: OTHER

## 2021-12-30 PROCEDURE — 77427 RADIATION TX MANAGEMENT X5: CPT

## 2021-12-30 PROCEDURE — 77401 RADIATION TX DELIVERY SUPFC: CPT

## 2021-12-30 PROCEDURE — OTHER SUPERFICIAL RADIATION TREATMENT: OTHER

## 2021-12-30 NOTE — PROCEDURE: TREATMENT REGIMEN
Plan: This patient has been treated today with image guided superficial radiation therapy for non-melanoma skin cancer. Written informed consent has been previously obtained from this patient for this treatment. This consent is documented in the patient’s chart. The patient gave verbal consent to continue treatment today. The patient was treated with a specific radiation dose and setup as prescribed by the provider listed on this visit note. A Radiation Therapist performed administration of radiation under supervision of provider. The treatment parameters and cumulative dose are indicated above. Prior to administering the radiation, the patient underwent a verification therapeutic radiology simulation-aided field setting defining relevant normal and abnormal target anatomy and acquiring images with high frequency ultrasound in addition to data necessary developing optimal radiation treatment process for the patient. This process includes verification of the treatment port(s) and proper treatment positioning. All treatment ports were photographed within electronic medical record. The patient’s customized lead blocking along with gross tumor volume and margin was confirmed. Considering superficial radiotherapy is clinical in setup, this requires physician and radiation therapist to clarify location interest being treated against initial images, pathology and patient anatomy. Care was taken ensuring fields treated were geometrically accurate and properly positioned using therapeutic radiology simulation-aided field setting verification per fraction. This process is also utilized to determine if any prescription or setup changes are necessary. These steps are therefore medically necessary ensuring safe and effective administration of radiation. Ongoing therapeutic radiology simulation-aided field setting verification is ordered throughout course of therapy.\\nA high frequency ultrasound image was acquired today for two-dimensional evaluation of the tumor volume and response to treatment, in addition to geometric accuracy of field placement. US depth is 1.43 mm, which is 0.07 mm in difference from previous imaging. The field placement and ultrasound imaging, per fraction, is separate and distinct from the initial simulation, and is an important task in providing safe administration of superficial radiation therapy. Physician evaluation of the ultrasound tumor depth will be ongoing throughout the course of treatment and is deemed medically necessary in order to ensure the efficacy of treatment and any necessary changes. Today’s image was evaluated for determination of continuation of treatment with the current plan or with necessary changes as appropriate. According to provider review of verification therapeutic radiology simulation-aided field setting and imaging, NO change is required . Additionally, the use of ultrasound visualization and targeted assessment allows the patient to be able to see their cancer(s) progress, encouraging patient to complete and maintain compliance through full course of radiotherapy. Per Dr. Martinez, continued ultrasound guidance and therapeutic radiology simulation-aided field setting verification per fraction is required for field placement, measurement of tumor depth, progress, and acute effect monitoring. \\n\\nPer the request of Dr. Martinez, this patient was seen today for Superficial Radiation Therapy management (CPT® 29055). A high frequency ultrasound image was acquired today for three-dimensional evaluation of tumor volume and response to treatment, in addition to geometric accuracy of field placement (CPT® ). Physician evaluation of the ultrasound tumor depth will be ongoing through the course of treatment and is deemed medically necessary ensuring efficacy of treatment. Today’s image and setup was evaluated determining continuation of treatment with the current plan, or necessary changes as appropriate. All appropriate custom blocking and treatment parameters were verified by the Radiation therapist according to initial simulation. \\n\\nPer Dr. Martinez, continued daily US guidance is necessary for measurement of tumor depth, progress and edema monitoring.\\nEvaluation for response and reaction to SRT based on current fraction and cumulative dose with a visual inspection and ultrasound demonstrates a normal expected response. Superficial Radiation Therapy will continue as planned.\\n\\nThe patient is undergoing superficial radiation therapy for skin cancer and presents for weekly evaluation and management. Per protocol and as documented on the flow sheet, the patient was questioned as to subjective redness, pruritus, pain, drainage, fatigue, or any other symptoms. Objectively, the radiation area was evaluated with regards to erythema, atrophy, scale, crusting, erosion, ulceration, edema, purpura, tenderness, warmth, drainage, and any other findings. The plan was extensively reviewed including dose, and dosing schedule. The simulation and clinical setup was also reviewed as was the external and any internal shields and based on this review the appropriateness and sufficiency of treatment was determined.\\nUS image was performed. US depth is 1.43 mm.\\nPer Dr. Martinez, continued daily US guidance and simulation is required for field placement, measurement of tumor depth, progress and edema monitoring. \\n\\nSubjective:\\nRedness\\nObjective:\\nErythema\\nAssessment:\\nAppropriate reaction\\nPlan: \\nContinue current treatment regimen\\nComments:\\nRTOG 1\\n\\n Plan: This patient has been treated today with image guided superficial radiation therapy for non-melanoma skin cancer. Written informed consent has been previously obtained from this patient for this treatment. This consent is documented in the patient’s chart. The patient gave verbal consent to continue treatment today. The patient was treated with a specific radiation dose and setup as prescribed by the provider listed on this visit note. A Radiation Therapist performed administration of radiation under supervision of provider. The treatment parameters and cumulative dose are indicated above. Prior to administering the radiation, the patient underwent a verification therapeutic radiology simulation-aided field setting defining relevant normal and abnormal target anatomy and acquiring images with high frequency ultrasound in addition to data necessary developing optimal radiation treatment process for the patient. This process includes verification of the treatment port(s) and proper treatment positioning. All treatment ports were photographed within electronic medical record. The patient’s customized lead blocking along with gross tumor volume and margin was confirmed. Considering superficial radiotherapy is clinical in setup, this requires physician and radiation therapist to clarify location interest being treated against initial images, pathology and patient anatomy. Care was taken ensuring fields treated were geometrically accurate and properly positioned using therapeutic radiology simulation-aided field setting verification per fraction. This process is also utilized to determine if any prescription or setup changes are necessary. These steps are therefore medically necessary ensuring safe and effective administration of radiation. Ongoing therapeutic radiology simulation-aided field setting verification is ordered throughout course of therapy.\\nA high frequency ultrasound image was acquired today for two-dimensional evaluation of the tumor volume and response to treatment, in addition to geometric accuracy of field placement. US depth is 1.43 mm, which is 0.07 mm in difference from previous imaging. The field placement and ultrasound imaging, per fraction, is separate and distinct from the initial simulation, and is an important task in providing safe administration of superficial radiation therapy. Physician evaluation of the ultrasound tumor depth will be ongoing throughout the course of treatment and is deemed medically necessary in order to ensure the efficacy of treatment and any necessary changes. Today’s image was evaluated for determination of continuation of treatment with the current plan or with necessary changes as appropriate. According to provider review of verification therapeutic radiology simulation-aided field setting and imaging, NO change is required . Additionally, the use of ultrasound visualization and targeted assessment allows the patient to be able to see their cancer(s) progress, encouraging patient to complete and maintain compliance through full course of radiotherapy. Per Dr. Martinez, continued ultrasound guidance and therapeutic radiology simulation-aided field setting verification per fraction is required for field placement, measurement of tumor depth, progress, and acute effect monitoring. \\n\\nPer the request of Dr. Martinez, this patient was seen today for Superficial Radiation Therapy management (CPT® 90387). A high frequency ultrasound image was acquired today for three-dimensional evaluation of tumor volume and response to treatment, in addition to geometric accuracy of field placement (CPT® ). Physician evaluation of the ultrasound tumor depth will be ongoing through the course of treatment and is deemed medically necessary ensuring efficacy of treatment. Today’s image and setup was evaluated determining continuation of treatment with the current plan, or necessary changes as appropriate. All appropriate custom blocking and treatment parameters were verified by the Radiation therapist according to initial simulation. \\n\\nPer Dr. Martinez, continued daily US guidance is necessary for measurement of tumor depth, progress and edema monitoring.\\nEvaluation for response and reaction to SRT based on current fraction and cumulative dose with a visual inspection and ultrasound demonstrates a normal expected response. Superficial Radiation Therapy will continue as planned.\\n\\nThe patient is undergoing superficial radiation therapy for skin cancer and presents for weekly evaluation and management. Per protocol and as documented on the flow sheet, the patient was questioned as to subjective redness, pruritus, pain, drainage, fatigue, or any other symptoms. Objectively, the radiation area was evaluated with regards to erythema, atrophy, scale, crusting, erosion, ulceration, edema, purpura, tenderness, warmth, drainage, and any other findings. The plan was extensively reviewed including dose, and dosing schedule. The simulation and clinical setup was also reviewed as was the external and any internal shields and based on this review the appropriateness and sufficiency of treatment was determined.\\nUS image was performed. US depth is 1.43 mm.\\nPer Dr. Martinez, continued daily US guidance and simulation is required for field placement, measurement of tumor depth, progress and edema monitoring. \\n\\nSubjective:\\nRedness\\nObjective:\\nErythema\\nAssessment:\\nAppropriate reaction\\nPlan: \\nContinue current treatment regimen\\nComments:\\nRTOG 1\\n\\n

## 2021-12-30 NOTE — PROCEDURE: TREATMENT REGIMEN
Plan: This patient has been treated today with image guided superficial radiation therapy for non-melanoma skin cancer. Written informed consent has been previously obtained from this patient for this treatment. This consent is documented in the patient’s chart. The patient gave verbal consent to continue treatment today. The patient was treated with a specific radiation dose and setup as prescribed by the provider listed on this visit note. A Radiation Therapist performed administration of radiation under supervision of provider. The treatment parameters and cumulative dose are indicated above. Prior to administering the radiation, the patient underwent a verification therapeutic radiology simulation-aided field setting defining relevant normal and abnormal target anatomy and acquiring images with high frequency ultrasound in addition to data necessary developing optimal radiation treatment process for the patient. This process includes verification of the treatment port(s) and proper treatment positioning. All treatment ports were photographed within electronic medical record. The patient’s customized lead blocking along with gross tumor volume and margin was confirmed. Considering superficial radiotherapy is clinical in setup, this requires physician and radiation therapist to clarify location interest being treated against initial images, pathology and patient anatomy. Care was taken ensuring fields treated were geometrically accurate and properly positioned using therapeutic radiology simulation-aided field setting verification per fraction. This process is also utilized to determine if any prescription or setup changes are necessary. These steps are therefore medically necessary ensuring safe and effective administration of radiation. Ongoing therapeutic radiology simulation-aided field setting verification is ordered throughout course of therapy.\\nA high frequency ultrasound image was acquired today for two-dimensional evaluation of the tumor volume and response to treatment, in addition to geometric accuracy of field placement. US depth is  1.48 mm, which is 0.30 mm in difference from previous imaging. The field placement and ultrasound imaging, per fraction, is separate and distinct from the initial simulation, and is an important task in providing safe administration of superficial radiation therapy. Physician evaluation of the ultrasound tumor depth will be ongoing throughout the course of treatment and is deemed medically necessary in order to ensure the efficacy of treatment and any necessary changes. Today’s image was evaluated for determination of continuation of treatment with the current plan or with necessary changes as appropriate. According to provider review of verification therapeutic radiology simulation-aided field setting and imaging, NO change necessary. Additionally, the use of ultrasound visualization and targeted assessment allows the patient to be able to see their cancer(s) progress, encouraging patient to complete and maintain compliance through full course of radiotherapy. Per Dr. Martinez, continued ultrasound guidance and therapeutic radiology simulation-aided field setting verification per fraction is required for field placement, measurement of tumor depth, progress, and acute effect monitoring. \\n\\nPer the request of , this patient was seen today for Superficial Radiation Therapy management (CPT® 57902). A high frequency ultrasound image was acquired today for three-dimensional evaluation of tumor volume and response to treatment, in addition to geometric accuracy of field placement (CPT® ). Physician evaluation of the ultrasound tumor depth will be ongoing through the course of treatment and is deemed medically necessary ensuring efficacy of treatment. Today’s image and setup was evaluated determining continuation of treatment with the current plan, or necessary changes as appropriate. All appropriate custom blocking and treatment parameters were verified by the Radiation therapist according to initial simulation. \\n\\nPer Dr. Martinez, continued daily US guidance is necessary for measurement of tumor depth, progress and edema monitoring.\\nEvaluation for response and reaction to SRT based on current fraction and cumulative dose with a visual inspection and ultrasound demonstrates a normal expected response. Superficial Radiation Therapy will continue as planned.\\n\\nThe patient is undergoing superficial radiation therapy for skin cancer and presents for weekly evaluation and management. Per protocol and as documented on the flow sheet, the patient was questioned as to subjective redness, pruritus, pain, drainage, fatigue, or any other symptoms. Objectively, the radiation area was evaluated with regards to erythema, atrophy, scale, crusting, erosion, ulceration, edema, purpura, tenderness, warmth, drainage, and any other findings. The plan was extensively reviewed including dose, and dosing schedule. The simulation and clinical setup was also reviewed as was the external and any internal shields and based on this review the appropriateness and sufficiency of treatment was determined.\\nUS image was performed. US depth is 1.48 mm.\\nPer Dr. Martinez, continued daily US guidance and simulation is required for field placement, measurement of tumor depth, progress and edema monitoring. \\n\\nSubjective:\\nRedness\\nObjective:\\nErythema\\nAssessment:\\nAppropriate reaction\\nPlan: \\nContinue current treatment regimen\\nComments:\\nRTOG 1\\n\\n\\n Plan: This patient has been treated today with image guided superficial radiation therapy for non-melanoma skin cancer. Written informed consent has been previously obtained from this patient for this treatment. This consent is documented in the patient’s chart. The patient gave verbal consent to continue treatment today. The patient was treated with a specific radiation dose and setup as prescribed by the provider listed on this visit note. A Radiation Therapist performed administration of radiation under supervision of provider. The treatment parameters and cumulative dose are indicated above. Prior to administering the radiation, the patient underwent a verification therapeutic radiology simulation-aided field setting defining relevant normal and abnormal target anatomy and acquiring images with high frequency ultrasound in addition to data necessary developing optimal radiation treatment process for the patient. This process includes verification of the treatment port(s) and proper treatment positioning. All treatment ports were photographed within electronic medical record. The patient’s customized lead blocking along with gross tumor volume and margin was confirmed. Considering superficial radiotherapy is clinical in setup, this requires physician and radiation therapist to clarify location interest being treated against initial images, pathology and patient anatomy. Care was taken ensuring fields treated were geometrically accurate and properly positioned using therapeutic radiology simulation-aided field setting verification per fraction. This process is also utilized to determine if any prescription or setup changes are necessary. These steps are therefore medically necessary ensuring safe and effective administration of radiation. Ongoing therapeutic radiology simulation-aided field setting verification is ordered throughout course of therapy.\\nA high frequency ultrasound image was acquired today for two-dimensional evaluation of the tumor volume and response to treatment, in addition to geometric accuracy of field placement. US depth is  1.48 mm, which is 0.30 mm in difference from previous imaging. The field placement and ultrasound imaging, per fraction, is separate and distinct from the initial simulation, and is an important task in providing safe administration of superficial radiation therapy. Physician evaluation of the ultrasound tumor depth will be ongoing throughout the course of treatment and is deemed medically necessary in order to ensure the efficacy of treatment and any necessary changes. Today’s image was evaluated for determination of continuation of treatment with the current plan or with necessary changes as appropriate. According to provider review of verification therapeutic radiology simulation-aided field setting and imaging, NO change necessary. Additionally, the use of ultrasound visualization and targeted assessment allows the patient to be able to see their cancer(s) progress, encouraging patient to complete and maintain compliance through full course of radiotherapy. Per Dr. Martinez, continued ultrasound guidance and therapeutic radiology simulation-aided field setting verification per fraction is required for field placement, measurement of tumor depth, progress, and acute effect monitoring. \\n\\nPer the request of , this patient was seen today for Superficial Radiation Therapy management (CPT® 46102). A high frequency ultrasound image was acquired today for three-dimensional evaluation of tumor volume and response to treatment, in addition to geometric accuracy of field placement (CPT® ). Physician evaluation of the ultrasound tumor depth will be ongoing through the course of treatment and is deemed medically necessary ensuring efficacy of treatment. Today’s image and setup was evaluated determining continuation of treatment with the current plan, or necessary changes as appropriate. All appropriate custom blocking and treatment parameters were verified by the Radiation therapist according to initial simulation. \\n\\nPer Dr. Martinez, continued daily US guidance is necessary for measurement of tumor depth, progress and edema monitoring.\\nEvaluation for response and reaction to SRT based on current fraction and cumulative dose with a visual inspection and ultrasound demonstrates a normal expected response. Superficial Radiation Therapy will continue as planned.\\n\\nThe patient is undergoing superficial radiation therapy for skin cancer and presents for weekly evaluation and management. Per protocol and as documented on the flow sheet, the patient was questioned as to subjective redness, pruritus, pain, drainage, fatigue, or any other symptoms. Objectively, the radiation area was evaluated with regards to erythema, atrophy, scale, crusting, erosion, ulceration, edema, purpura, tenderness, warmth, drainage, and any other findings. The plan was extensively reviewed including dose, and dosing schedule. The simulation and clinical setup was also reviewed as was the external and any internal shields and based on this review the appropriateness and sufficiency of treatment was determined.\\nUS image was performed. US depth is 1.48 mm.\\nPer Dr. Martinez, continued daily US guidance and simulation is required for field placement, measurement of tumor depth, progress and edema monitoring. \\n\\nSubjective:\\nRedness\\nObjective:\\nErythema\\nAssessment:\\nAppropriate reaction\\nPlan: \\nContinue current treatment regimen\\nComments:\\nRTOG 1\\n\\n\\n

## 2022-01-02 ENCOUNTER — APPOINTMENT (OUTPATIENT)
Dept: URBAN - METROPOLITAN AREA CLINIC 268 | Age: 77
Setting detail: DERMATOLOGY
End: 2022-01-11

## 2022-01-02 PROCEDURE — 77336 RADIATION PHYSICS CONSULT: CPT

## 2022-01-03 ENCOUNTER — APPOINTMENT (OUTPATIENT)
Dept: URBAN - METROPOLITAN AREA CLINIC 268 | Age: 77
Setting detail: DERMATOLOGY
End: 2022-01-04

## 2022-01-03 PROBLEM — C44.319 BASAL CELL CARCINOMA OF SKIN OF OTHER PARTS OF FACE: Status: ACTIVE | Noted: 2022-01-03

## 2022-01-03 PROBLEM — C44.311 BASAL CELL CARCINOMA OF SKIN OF NOSE: Status: ACTIVE | Noted: 2022-01-03

## 2022-01-03 PROCEDURE — G6001 ECHO GUIDANCE RADIOTHERAPY: HCPCS

## 2022-01-03 PROCEDURE — OTHER TREATMENT REGIMEN: OTHER

## 2022-01-03 PROCEDURE — 77401 RADIATION TX DELIVERY SUPFC: CPT

## 2022-01-03 PROCEDURE — 77280 THER RAD SIMULAJ FIELD SMPL: CPT

## 2022-01-03 PROCEDURE — OTHER SUPERFICIAL RADIATION TREATMENT: OTHER

## 2022-01-03 NOTE — PROCEDURE: SUPERFICIAL RADIATION TREATMENT
Port Dimensions-X Axis In Cm: 1.7 11.0   4.1   )-----------( 243      ( 02 Oct 2017 20:48 )             33.4     10-02    143  |  108  |  14  ----------------------------<  78  3.5   |  27  |  0.68    Ca    8.8      02 Oct 2017 20:48    TPro  7.3  /  Alb  3.6  /  TBili  0.4  /  DBili  x   /  AST  17  /  ALT  17  /  AlkPhos  86  10-02    PT/INR - ( 02 Oct 2017 20:48 )   PT: 12.3 sec;   INR: 1.13 ratio         PTT - ( 02 Oct 2017 20:48 )  PTT:33.6 sec    CAPILLARY BLOOD GLUCOSE  94 (03 Oct 2017 16:35)  213 (03 Oct 2017 11:29)  83 (03 Oct 2017 07:53)  83 (03 Oct 2017 04:15)

## 2022-01-03 NOTE — PROCEDURE: SUPERFICIAL RADIATION TREATMENT
Dear Monisha Padron    Your recent labs were reviewed and released to your account. Your lab results were normal. Your potassium was very mildly low but since you said you took a fluid pill recently for the swelling this could have contributed. Lets hold of on fluid meds unless the swelling really persists despite compression stockings and lowering sodium in diet. Please let me know if you have any questions.    Pelon Smallwood MD       Dose / Tx In Cgy (Optional): 272.80

## 2022-01-03 NOTE — PROCEDURE: TREATMENT REGIMEN
Detail Level: Zone
Plan: This patient has been treated today with image guided superficial radiation therapy for non-melanoma skin cancer. Written informed consent has been previously obtained from this patient for this treatment. This consent is documented in the patient’s chart. The patient gave verbal consent to continue treatment today. The patient was treated with a specific radiation dose and setup as prescribed by the provider listed on this visit note. A Radiation Therapist performed administration of radiation under supervision of provider. The treatment parameters and cumulative dose are indicated above. Prior to administering the radiation, the patient underwent a verification therapeutic radiology simulation-aided field setting defining relevant normal and abnormal target anatomy and acquiring images with high frequency ultrasound in addition to data necessary developing optimal radiation treatment process for the patient. This process includes verification of the treatment port(s) and proper treatment positioning. All treatment ports were photographed within electronic medical record. The patient’s customized lead blocking along with gross tumor volume and margin was confirmed. Considering superficial radiotherapy is clinical in setup, this requires physician and radiation therapist to clarify location interest being treated against initial images, pathology and patient anatomy. Care was taken ensuring fields treated were geometrically accurate and properly positioned using therapeutic radiology simulation-aided field setting verification per fraction. This process is also utilized to determine if any prescription or setup changes are necessary. These steps are therefore medically necessary ensuring safe and effective administration of radiation. Ongoing therapeutic radiology simulation-aided field setting verification is ordered throughout course of therapy.\\nA high frequency ultrasound image was acquired today for two-dimensional evaluation of the tumor volume and response to treatment, in addition to geometric accuracy of field placement. US depth is  1.58 mm, which is 0.10 mm in difference from previous imaging. The field placement and ultrasound imaging, per fraction, is separate and distinct from the initial simulation, and is an important task in providing safe administration of superficial radiation therapy. Physician evaluation of the ultrasound tumor depth will be ongoing throughout the course of treatment and is deemed medically necessary in order to ensure the efficacy of treatment and any necessary changes. Today’s image was evaluated for determination of continuation of treatment with the current plan or with necessary changes as appropriate. According to provider review of verification therapeutic radiology simulation-aided field setting and imaging, NO change necessary. Additionally, the use of ultrasound visualization and targeted assessment allows the patient to be able to see their cancer(s) progress, encouraging patient to complete and maintain compliance through full course of radiotherapy. Per Dr. Martinez, continued ultrasound guidance and therapeutic radiology simulation-aided field setting verification per fraction is required for field placement, measurement of tumor depth, progress, and acute effect monitoring. \\n
Plan: This patient has been treated today with image guided superficial radiation therapy for non-melanoma skin cancer. Written informed consent has been previously obtained from this patient for this treatment. This consent is documented in the patient’s chart. The patient gave verbal consent to continue treatment today. The patient was treated with a specific radiation dose and setup as prescribed by the provider listed on this visit note. A Radiation Therapist performed administration of radiation under supervision of provider. The treatment parameters and cumulative dose are indicated above. Prior to administering the radiation, the patient underwent a verification therapeutic radiology simulation-aided field setting defining relevant normal and abnormal target anatomy and acquiring images with high frequency ultrasound in addition to data necessary developing optimal radiation treatment process for the patient. This process includes verification of the treatment port(s) and proper treatment positioning. All treatment ports were photographed within electronic medical record. The patient’s customized lead blocking along with gross tumor volume and margin was confirmed. Considering superficial radiotherapy is clinical in setup, this requires physician and radiation therapist to clarify location interest being treated against initial images, pathology and patient anatomy. Care was taken ensuring fields treated were geometrically accurate and properly positioned using therapeutic radiology simulation-aided field setting verification per fraction. This process is also utilized to determine if any prescription or setup changes are necessary. These steps are therefore medically necessary ensuring safe and effective administration of radiation. Ongoing therapeutic radiology simulation-aided field setting verification is ordered throughout course of therapy.\\nA high frequency ultrasound image was acquired today for two-dimensional evaluation of the tumor volume and response to treatment, in addition to geometric accuracy of field placement. US depth is 2.27 mm, which is 0.84 mm in difference from previous imaging. The field placement and ultrasound imaging, per fraction, is separate and distinct from the initial simulation, and is an important task in providing safe administration of superficial radiation therapy. Physician evaluation of the ultrasound tumor depth will be ongoing throughout the course of treatment and is deemed medically necessary in order to ensure the efficacy of treatment and any necessary changes. Today’s image was evaluated for determination of continuation of treatment with the current plan or with necessary changes as appropriate. According to provider review of verification therapeutic radiology simulation-aided field setting and imaging, NO change is required . Additionally, the use of ultrasound visualization and targeted assessment allows the patient to be able to see their cancer(s) progress, encouraging patient to complete and maintain compliance through full course of radiotherapy. Per Dr. Martinez, continued ultrasound guidance and therapeutic radiology simulation-aided field setting verification per fraction is required for field placement, measurement of tumor depth, progress, and acute effect monitoring. \\n

## 2022-01-03 NOTE — PROCEDURE: SUPERFICIAL RADIATION TREATMENT
Bill For Simulation And Treatment Device Design: Yes - (Intermediate Simulation: 97491) Bill For Simulation And Treatment Device Design: Yes - (Intermediate Simulation: 30771)

## 2022-01-04 ENCOUNTER — APPOINTMENT (OUTPATIENT)
Dept: URBAN - METROPOLITAN AREA CLINIC 268 | Age: 77
Setting detail: DERMATOLOGY
End: 2022-01-05

## 2022-01-04 PROBLEM — C44.311 BASAL CELL CARCINOMA OF SKIN OF NOSE: Status: ACTIVE | Noted: 2022-01-04

## 2022-01-04 PROBLEM — C44.319 BASAL CELL CARCINOMA OF SKIN OF OTHER PARTS OF FACE: Status: ACTIVE | Noted: 2022-01-04

## 2022-01-04 PROCEDURE — OTHER TREATMENT REGIMEN: OTHER

## 2022-01-04 PROCEDURE — OTHER SUPERFICIAL RADIATION TREATMENT: OTHER

## 2022-01-04 PROCEDURE — 77401 RADIATION TX DELIVERY SUPFC: CPT

## 2022-01-04 PROCEDURE — G6001 ECHO GUIDANCE RADIOTHERAPY: HCPCS

## 2022-01-04 PROCEDURE — 77280 THER RAD SIMULAJ FIELD SMPL: CPT

## 2022-01-04 NOTE — PROCEDURE: SUPERFICIAL RADIATION TREATMENT
Bill For Simulation And Treatment Device Design: Yes - (Intermediate Simulation: 09007) Bill For Simulation And Treatment Device Design: Yes - (Intermediate Simulation: 09029)

## 2022-01-04 NOTE — PROCEDURE: TREATMENT REGIMEN
Plan: This patient has been treated today with image guided superficial radiation therapy for non-melanoma skin cancer. Written informed consent has been previously obtained from this patient for this treatment. This consent is documented in the patient’s chart. The patient gave verbal consent to continue treatment today. The patient was treated with a specific radiation dose and setup as prescribed by the provider listed on this visit note. A Radiation Therapist performed administration of radiation under supervision of provider. The treatment parameters and cumulative dose are indicated above. Prior to administering the radiation, the patient underwent a verification therapeutic radiology simulation-aided field setting defining relevant normal and abnormal target anatomy and acquiring images with high frequency ultrasound in addition to data necessary developing optimal radiation treatment process for the patient. This process includes verification of the treatment port(s) and proper treatment positioning. All treatment ports were photographed within electronic medical record. The patient’s customized lead blocking along with gross tumor volume and margin was confirmed. Considering superficial radiotherapy is clinical in setup, this requires physician and radiation therapist to clarify location interest being treated against initial images, pathology and patient anatomy. Care was taken ensuring fields treated were geometrically accurate and properly positioned using therapeutic radiology simulation-aided field setting verification per fraction. This process is also utilized to determine if any prescription or setup changes are necessary. These steps are therefore medically necessary ensuring safe and effective administration of radiation. Ongoing therapeutic radiology simulation-aided field setting verification is ordered throughout course of therapy.\\nA high frequency ultrasound image was acquired today for two-dimensional evaluation of the tumor volume and response to treatment, in addition to geometric accuracy of field placement. US depth is  1.42 mm, which is 0.16 mm in difference from previous imaging. The field placement and ultrasound imaging, per fraction, is separate and distinct from the initial simulation, and is an important task in providing safe administration of superficial radiation therapy. Physician evaluation of the ultrasound tumor depth will be ongoing throughout the course of treatment and is deemed medically necessary in order to ensure the efficacy of treatment and any necessary changes. Today’s image was evaluated for determination of continuation of treatment with the current plan or with necessary changes as appropriate. According to provider review of verification therapeutic radiology simulation-aided field setting and imaging, NO change necessary. Additionally, the use of ultrasound visualization and targeted assessment allows the patient to be able to see their cancer(s) progress, encouraging patient to complete and maintain compliance through full course of radiotherapy. Per Dr. Martinez, continued ultrasound guidance and therapeutic radiology simulation-aided field setting verification per fraction is required for field placement, measurement of tumor depth, progress, and acute effect monitoring. \\n
Plan: This patient has been treated today with image guided superficial radiation therapy for non-melanoma skin cancer. Written informed consent has been previously obtained from this patient for this treatment. This consent is documented in the patient’s chart. The patient gave verbal consent to continue treatment today. The patient was treated with a specific radiation dose and setup as prescribed by the provider listed on this visit note. A Radiation Therapist performed administration of radiation under supervision of provider. The treatment parameters and cumulative dose are indicated above. Prior to administering the radiation, the patient underwent a verification therapeutic radiology simulation-aided field setting defining relevant normal and abnormal target anatomy and acquiring images with high frequency ultrasound in addition to data necessary developing optimal radiation treatment process for the patient. This process includes verification of the treatment port(s) and proper treatment positioning. All treatment ports were photographed within electronic medical record. The patient’s customized lead blocking along with gross tumor volume and margin was confirmed. Considering superficial radiotherapy is clinical in setup, this requires physician and radiation therapist to clarify location interest being treated against initial images, pathology and patient anatomy. Care was taken ensuring fields treated were geometrically accurate and properly positioned using therapeutic radiology simulation-aided field setting verification per fraction. This process is also utilized to determine if any prescription or setup changes are necessary. These steps are therefore medically necessary ensuring safe and effective administration of radiation. Ongoing therapeutic radiology simulation-aided field setting verification is ordered throughout course of therapy.\\nA high frequency ultrasound image was acquired today for two-dimensional evaluation of the tumor volume and response to treatment, in addition to geometric accuracy of field placement. US depth is 1.65 mm, which is 0.62 mm in difference from previous imaging. The field placement and ultrasound imaging, per fraction, is separate and distinct from the initial simulation, and is an important task in providing safe administration of superficial radiation therapy. Physician evaluation of the ultrasound tumor depth will be ongoing throughout the course of treatment and is deemed medically necessary in order to ensure the efficacy of treatment and any necessary changes. Today’s image was evaluated for determination of continuation of treatment with the current plan or with necessary changes as appropriate. According to provider review of verification therapeutic radiology simulation-aided field setting and imaging, NO change is required . Additionally, the use of ultrasound visualization and targeted assessment allows the patient to be able to see their cancer(s) progress, encouraging patient to complete and maintain compliance through full course of radiotherapy. Per Dr. Martinez, continued ultrasound guidance and therapeutic radiology simulation-aided field setting verification per fraction is required for field placement, measurement of tumor depth, progress, and acute effect monitoring. \\n
Detail Level: Zone

## 2022-01-05 ENCOUNTER — APPOINTMENT (OUTPATIENT)
Dept: URBAN - METROPOLITAN AREA CLINIC 268 | Age: 77
Setting detail: DERMATOLOGY
End: 2022-01-11

## 2022-01-05 PROBLEM — C44.311 BASAL CELL CARCINOMA OF SKIN OF NOSE: Status: ACTIVE | Noted: 2022-01-05

## 2022-01-05 PROBLEM — C44.319 BASAL CELL CARCINOMA OF SKIN OF OTHER PARTS OF FACE: Status: ACTIVE | Noted: 2022-01-05

## 2022-01-05 PROCEDURE — OTHER TREATMENT REGIMEN: OTHER

## 2022-01-05 PROCEDURE — OTHER SUPERFICIAL RADIATION TREATMENT: OTHER

## 2022-01-05 PROCEDURE — 77280 THER RAD SIMULAJ FIELD SMPL: CPT

## 2022-01-05 PROCEDURE — G6001 ECHO GUIDANCE RADIOTHERAPY: HCPCS

## 2022-01-05 PROCEDURE — 77401 RADIATION TX DELIVERY SUPFC: CPT

## 2022-01-05 NOTE — PROCEDURE: TREATMENT REGIMEN
Detail Level: Zone
Plan: This patient has been treated today with image guided superficial radiation therapy for non-melanoma skin cancer. Written informed consent has been previously obtained from this patient for this treatment. This consent is documented in the patient’s chart. The patient gave verbal consent to continue treatment today. The patient was treated with a specific radiation dose and setup as prescribed by the provider listed on this visit note. A Radiation Therapist performed administration of radiation under supervision of provider. The treatment parameters and cumulative dose are indicated above. Prior to administering the radiation, the patient underwent a verification therapeutic radiology simulation-aided field setting defining relevant normal and abnormal target anatomy and acquiring images with high frequency ultrasound in addition to data necessary developing optimal radiation treatment process for the patient. This process includes verification of the treatment port(s) and proper treatment positioning. All treatment ports were photographed within electronic medical record. The patient’s customized lead blocking along with gross tumor volume and margin was confirmed. Considering superficial radiotherapy is clinical in setup, this requires physician and radiation therapist to clarify location interest being treated against initial images, pathology and patient anatomy. Care was taken ensuring fields treated were geometrically accurate and properly positioned using therapeutic radiology simulation-aided field setting verification per fraction. This process is also utilized to determine if any prescription or setup changes are necessary. These steps are therefore medically necessary ensuring safe and effective administration of radiation. Ongoing therapeutic radiology simulation-aided field setting verification is ordered throughout course of therapy.\\nA high frequency ultrasound image was acquired today for two-dimensional evaluation of the tumor volume and response to treatment, in addition to geometric accuracy of field placement. US depth is 1.86 mm, which is 0.21 mm in difference from previous imaging. The field placement and ultrasound imaging, per fraction, is separate and distinct from the initial simulation, and is an important task in providing safe administration of superficial radiation therapy. Physician evaluation of the ultrasound tumor depth will be ongoing throughout the course of treatment and is deemed medically necessary in order to ensure the efficacy of treatment and any necessary changes. Today’s image was evaluated for determination of continuation of treatment with the current plan or with necessary changes as appropriate. According to provider review of verification therapeutic radiology simulation-aided field setting and imaging, NO change is required . Additionally, the use of ultrasound visualization and targeted assessment allows the patient to be able to see their cancer(s) progress, encouraging patient to complete and maintain compliance through full course of radiotherapy. Per Dr. Martinez, continued ultrasound guidance and therapeutic radiology simulation-aided field setting verification per fraction is required for field placement, measurement of tumor depth, progress, and acute effect monitoring. \\n
Not Applicable
Plan: This patient has been treated today with image guided superficial radiation therapy for non-melanoma skin cancer. Written informed consent has been previously obtained from this patient for this treatment. This consent is documented in the patient’s chart. The patient gave verbal consent to continue treatment today. The patient was treated with a specific radiation dose and setup as prescribed by the provider listed on this visit note. A Radiation Therapist performed administration of radiation under supervision of provider. The treatment parameters and cumulative dose are indicated above. Prior to administering the radiation, the patient underwent a verification therapeutic radiology simulation-aided field setting defining relevant normal and abnormal target anatomy and acquiring images with high frequency ultrasound in addition to data necessary developing optimal radiation treatment process for the patient. This process includes verification of the treatment port(s) and proper treatment positioning. All treatment ports were photographed within electronic medical record. The patient’s customized lead blocking along with gross tumor volume and margin was confirmed. Considering superficial radiotherapy is clinical in setup, this requires physician and radiation therapist to clarify location interest being treated against initial images, pathology and patient anatomy. Care was taken ensuring fields treated were geometrically accurate and properly positioned using therapeutic radiology simulation-aided field setting verification per fraction. This process is also utilized to determine if any prescription or setup changes are necessary. These steps are therefore medically necessary ensuring safe and effective administration of radiation. Ongoing therapeutic radiology simulation-aided field setting verification is ordered throughout course of therapy.\\nA high frequency ultrasound image was acquired today for two-dimensional evaluation of the tumor volume and response to treatment, in addition to geometric accuracy of field placement. US depth is  1.65 mm, which is 0.23 mm in difference from previous imaging. The field placement and ultrasound imaging, per fraction, is separate and distinct from the initial simulation, and is an important task in providing safe administration of superficial radiation therapy. Physician evaluation of the ultrasound tumor depth will be ongoing throughout the course of treatment and is deemed medically necessary in order to ensure the efficacy of treatment and any necessary changes. Today’s image was evaluated for determination of continuation of treatment with the current plan or with necessary changes as appropriate. According to provider review of verification therapeutic radiology simulation-aided field setting and imaging, NO change necessary. Additionally, the use of ultrasound visualization and targeted assessment allows the patient to be able to see their cancer(s) progress, encouraging patient to complete and maintain compliance through full course of radiotherapy. Per Dr. Martinez, continued ultrasound guidance and therapeutic radiology simulation-aided field setting verification per fraction is required for field placement, measurement of tumor depth, progress, and acute effect monitoring. \\n

## 2022-01-05 NOTE — PROCEDURE: SUPERFICIAL RADIATION TREATMENT
Bill For Simulation And Treatment Device Design: Yes - (Intermediate Simulation: 60655) Bill For Simulation And Treatment Device Design: Yes - (Intermediate Simulation: 12971)

## 2022-01-10 ENCOUNTER — APPOINTMENT (OUTPATIENT)
Dept: URBAN - METROPOLITAN AREA CLINIC 268 | Age: 77
Setting detail: DERMATOLOGY
End: 2022-01-11

## 2022-01-10 PROBLEM — C44.311 BASAL CELL CARCINOMA OF SKIN OF NOSE: Status: ACTIVE | Noted: 2022-01-10

## 2022-01-10 PROBLEM — C44.319 BASAL CELL CARCINOMA OF SKIN OF OTHER PARTS OF FACE: Status: ACTIVE | Noted: 2022-01-10

## 2022-01-10 PROCEDURE — G6001 ECHO GUIDANCE RADIOTHERAPY: HCPCS

## 2022-01-10 PROCEDURE — OTHER SUPERFICIAL RADIATION TREATMENT: OTHER

## 2022-01-10 PROCEDURE — 77285 THER RAD SIMULAJ FIELD INTRM: CPT

## 2022-01-10 PROCEDURE — 77401 RADIATION TX DELIVERY SUPFC: CPT

## 2022-01-10 PROCEDURE — OTHER TREATMENT REGIMEN: OTHER

## 2022-01-10 NOTE — PROCEDURE: SUPERFICIAL RADIATION TREATMENT
Bill For Simulation And Treatment Device Design: Yes - (Intermediate Simulation: 30492) Bill For Simulation And Treatment Device Design: Yes - (Intermediate Simulation: 52714)

## 2022-01-10 NOTE — PROCEDURE: TREATMENT REGIMEN
Detail Level: Zone
Plan: This patient has been treated today with image guided superficial radiation therapy for non-melanoma skin cancer. Written informed consent has been previously obtained from this patient for this treatment. This consent is documented in the patient’s chart. The patient gave verbal consent to continue treatment today. The patient was treated with a specific radiation dose and setup as prescribed by the provider listed on this visit note. A Radiation Therapist performed administration of radiation under supervision of provider. The treatment parameters and cumulative dose are indicated above. Prior to administering the radiation, the patient underwent a verification therapeutic radiology simulation-aided field setting defining relevant normal and abnormal target anatomy and acquiring images with high frequency ultrasound in addition to data necessary developing optimal radiation treatment process for the patient. This process includes verification of the treatment port(s) and proper treatment positioning. All treatment ports were photographed within electronic medical record. The patient’s customized lead blocking along with gross tumor volume and margin was confirmed. Considering superficial radiotherapy is clinical in setup, this requires physician and radiation therapist to clarify location interest being treated against initial images, pathology and patient anatomy. Care was taken ensuring fields treated were geometrically accurate and properly positioned using therapeutic radiology simulation-aided field setting verification per fraction. This process is also utilized to determine if any prescription or setup changes are necessary. These steps are therefore medically necessary ensuring safe and effective administration of radiation. Ongoing therapeutic radiology simulation-aided field setting verification is ordered throughout course of therapy.\\nA high frequency ultrasound image was acquired today for two-dimensional evaluation of the tumor volume and response to treatment, in addition to geometric accuracy of field placement. US depth is 1.88 mm, which is 0.02 mm in difference from previous imaging. The field placement and ultrasound imaging, per fraction, is separate and distinct from the initial simulation, and is an important task in providing safe administration of superficial radiation therapy. Physician evaluation of the ultrasound tumor depth will be ongoing throughout the course of treatment and is deemed medically necessary in order to ensure the efficacy of treatment and any necessary changes. Today’s image was evaluated for determination of continuation of treatment with the current plan or with necessary changes as appropriate. According to provider review of verification therapeutic radiology simulation-aided field setting and imaging, NO change is required . Additionally, the use of ultrasound visualization and targeted assessment allows the patient to be able to see their cancer(s) progress, encouraging patient to complete and maintain compliance through full course of radiotherapy. Per Dr. Martinez, continued ultrasound guidance and therapeutic radiology simulation-aided field setting verification per fraction is required for field placement, measurement of tumor depth, progress, and acute effect monitoring. \\n
Plan: This patient has been treated today with image guided superficial radiation therapy for non-melanoma skin cancer. Written informed consent has been previously obtained from this patient for this treatment. This consent is documented in the patient’s chart. The patient gave verbal consent to continue treatment today. The patient was treated with a specific radiation dose and setup as prescribed by the provider listed on this visit note. A Radiation Therapist performed administration of radiation under supervision of provider. The treatment parameters and cumulative dose are indicated above. Prior to administering the radiation, the patient underwent a verification therapeutic radiology simulation-aided field setting defining relevant normal and abnormal target anatomy and acquiring images with high frequency ultrasound in addition to data necessary developing optimal radiation treatment process for the patient. This process includes verification of the treatment port(s) and proper treatment positioning. All treatment ports were photographed within electronic medical record. The patient’s customized lead blocking along with gross tumor volume and margin was confirmed. Considering superficial radiotherapy is clinical in setup, this requires physician and radiation therapist to clarify location interest being treated against initial images, pathology and patient anatomy. Care was taken ensuring fields treated were geometrically accurate and properly positioned using therapeutic radiology simulation-aided field setting verification per fraction. This process is also utilized to determine if any prescription or setup changes are necessary. These steps are therefore medically necessary ensuring safe and effective administration of radiation. Ongoing therapeutic radiology simulation-aided field setting verification is ordered throughout course of therapy.\\nA high frequency ultrasound image was acquired today for two-dimensional evaluation of the tumor volume and response to treatment, in addition to geometric accuracy of field placement. US depth is  1.65 mm, which is 0.23 mm in difference from previous imaging. The field placement and ultrasound imaging, per fraction, is separate and distinct from the initial simulation, and is an important task in providing safe administration of superficial radiation therapy. Physician evaluation of the ultrasound tumor depth will be ongoing throughout the course of treatment and is deemed medically necessary in order to ensure the efficacy of treatment and any necessary changes. Today’s image was evaluated for determination of continuation of treatment with the current plan or with necessary changes as appropriate. According to provider review of verification therapeutic radiology simulation-aided field setting and imaging, NO change necessary. Additionally, the use of ultrasound visualization and targeted assessment allows the patient to be able to see their cancer(s) progress, encouraging patient to complete and maintain compliance through full course of radiotherapy. Per Dr. Martinez, continued ultrasound guidance and therapeutic radiology simulation-aided field setting verification per fraction is required for field placement, measurement of tumor depth, progress, and acute effect monitoring. \\n

## 2022-01-11 ENCOUNTER — APPOINTMENT (OUTPATIENT)
Dept: URBAN - METROPOLITAN AREA CLINIC 268 | Age: 77
Setting detail: DERMATOLOGY
End: 2022-01-12

## 2022-01-11 PROBLEM — C44.319 BASAL CELL CARCINOMA OF SKIN OF OTHER PARTS OF FACE: Status: ACTIVE | Noted: 2022-01-11

## 2022-01-11 PROBLEM — C44.311 BASAL CELL CARCINOMA OF SKIN OF NOSE: Status: ACTIVE | Noted: 2022-01-11

## 2022-01-11 PROCEDURE — 77401 RADIATION TX DELIVERY SUPFC: CPT

## 2022-01-11 PROCEDURE — 77280 THER RAD SIMULAJ FIELD SMPL: CPT

## 2022-01-11 PROCEDURE — OTHER TREATMENT REGIMEN: OTHER

## 2022-01-11 PROCEDURE — G6001 ECHO GUIDANCE RADIOTHERAPY: HCPCS

## 2022-01-11 PROCEDURE — OTHER SUPERFICIAL RADIATION TREATMENT: OTHER

## 2022-01-11 NOTE — PROCEDURE: TREATMENT REGIMEN
Plan: This patient has been treated today with image guided superficial radiation therapy for non-melanoma skin cancer. Written informed consent has been previously obtained from this patient for this treatment. This consent is documented in the patient’s chart. The patient gave verbal consent to continue treatment today. The patient was treated with a specific radiation dose and setup as prescribed by the provider listed on this visit note. A Radiation Therapist performed administration of radiation under supervision of provider. The treatment parameters and cumulative dose are indicated above. Prior to administering the radiation, the patient underwent a verification therapeutic radiology simulation-aided field setting defining relevant normal and abnormal target anatomy and acquiring images with high frequency ultrasound in addition to data necessary developing optimal radiation treatment process for the patient. This process includes verification of the treatment port(s) and proper treatment positioning. All treatment ports were photographed within electronic medical record. The patient’s customized lead blocking along with gross tumor volume and margin was confirmed. Considering superficial radiotherapy is clinical in setup, this requires physician and radiation therapist to clarify location interest being treated against initial images, pathology and patient anatomy. Care was taken ensuring fields treated were geometrically accurate and properly positioned using therapeutic radiology simulation-aided field setting verification per fraction. This process is also utilized to determine if any prescription or setup changes are necessary. These steps are therefore medically necessary ensuring safe and effective administration of radiation. Ongoing therapeutic radiology simulation-aided field setting verification is ordered throughout course of therapy.\\nA high frequency ultrasound image was acquired today for two-dimensional evaluation of the tumor volume and response to treatment, in addition to geometric accuracy of field placement. US depth is  1.65 mm, which is 0.23 mm in difference from previous imaging. The field placement and ultrasound imaging, per fraction, is separate and distinct from the initial simulation, and is an important task in providing safe administration of superficial radiation therapy. Physician evaluation of the ultrasound tumor depth will be ongoing throughout the course of treatment and is deemed medically necessary in order to ensure the efficacy of treatment and any necessary changes. Today’s image was evaluated for determination of continuation of treatment with the current plan or with necessary changes as appropriate. According to provider review of verification therapeutic radiology simulation-aided field setting and imaging, NO change necessary. Additionally, the use of ultrasound visualization and targeted assessment allows the patient to be able to see their cancer(s) progress, encouraging patient to complete and maintain compliance through full course of radiotherapy. Per Dr. Martinez, continued ultrasound guidance and therapeutic radiology simulation-aided field setting verification per fraction is required for field placement, measurement of tumor depth, progress, and acute effect monitoring. \\n
Detail Level: Zone
Plan: This patient has been treated today with image guided superficial radiation therapy for non-melanoma skin cancer. Written informed consent has been previously obtained from this patient for this treatment. This consent is documented in the patient’s chart. The patient gave verbal consent to continue treatment today. The patient was treated with a specific radiation dose and setup as prescribed by the provider listed on this visit note. A Radiation Therapist performed administration of radiation under supervision of provider. The treatment parameters and cumulative dose are indicated above. Prior to administering the radiation, the patient underwent a verification therapeutic radiology simulation-aided field setting defining relevant normal and abnormal target anatomy and acquiring images with high frequency ultrasound in addition to data necessary developing optimal radiation treatment process for the patient. This process includes verification of the treatment port(s) and proper treatment positioning. All treatment ports were photographed within electronic medical record. The patient’s customized lead blocking along with gross tumor volume and margin was confirmed. Considering superficial radiotherapy is clinical in setup, this requires physician and radiation therapist to clarify location interest being treated against initial images, pathology and patient anatomy. Care was taken ensuring fields treated were geometrically accurate and properly positioned using therapeutic radiology simulation-aided field setting verification per fraction. This process is also utilized to determine if any prescription or setup changes are necessary. These steps are therefore medically necessary ensuring safe and effective administration of radiation. Ongoing therapeutic radiology simulation-aided field setting verification is ordered throughout course of therapy.\\nA high frequency ultrasound image was acquired today for two-dimensional evaluation of the tumor volume and response to treatment, in addition to geometric accuracy of field placement. US depth is 1.24 mm, which is 0.64 mm in difference from previous imaging. The field placement and ultrasound imaging, per fraction, is separate and distinct from the initial simulation, and is an important task in providing safe administration of superficial radiation therapy. Physician evaluation of the ultrasound tumor depth will be ongoing throughout the course of treatment and is deemed medically necessary in order to ensure the efficacy of treatment and any necessary changes. Today’s image was evaluated for determination of continuation of treatment with the current plan or with necessary changes as appropriate. According to provider review of verification therapeutic radiology simulation-aided field setting and imaging, NO change is required . Additionally, the use of ultrasound visualization and targeted assessment allows the patient to be able to see their cancer(s) progress, encouraging patient to complete and maintain compliance through full course of radiotherapy. Per Dr. Martinez, continued ultrasound guidance and therapeutic radiology simulation-aided field setting verification per fraction is required for field placement, measurement of tumor depth, progress, and acute effect monitoring. \\n

## 2022-01-11 NOTE — PROCEDURE: SUPERFICIAL RADIATION TREATMENT
Bill For Simulation And Treatment Device Design: Yes - (Intermediate Simulation: 87670) Bill For Simulation And Treatment Device Design: Yes - (Intermediate Simulation: 48924)

## 2022-01-12 ENCOUNTER — APPOINTMENT (OUTPATIENT)
Dept: URBAN - METROPOLITAN AREA CLINIC 268 | Age: 77
Setting detail: DERMATOLOGY
End: 2022-01-14

## 2022-01-12 PROBLEM — C44.311 BASAL CELL CARCINOMA OF SKIN OF NOSE: Status: ACTIVE | Noted: 2022-01-12

## 2022-01-12 PROCEDURE — OTHER SUPERFICIAL RADIATION TREATMENT: OTHER

## 2022-01-12 PROCEDURE — 77280 THER RAD SIMULAJ FIELD SMPL: CPT

## 2022-01-12 PROCEDURE — 77401 RADIATION TX DELIVERY SUPFC: CPT

## 2022-01-12 PROCEDURE — OTHER TREATMENT REGIMEN: OTHER

## 2022-01-12 PROCEDURE — G6001 ECHO GUIDANCE RADIOTHERAPY: HCPCS

## 2022-01-12 NOTE — PROCEDURE: TREATMENT REGIMEN
Plan: This patient has been treated today with image guided superficial radiation therapy for non-melanoma skin cancer. Written informed consent has been previously obtained from this patient for this treatment. This consent is documented in the patient’s chart. The patient gave verbal consent to continue treatment today. The patient was treated with a specific radiation dose and setup as prescribed by the provider listed on this visit note. A Radiation Therapist performed administration of radiation under supervision of provider. The treatment parameters and cumulative dose are indicated above. Prior to administering the radiation, the patient underwent a verification therapeutic radiology simulation-aided field setting defining relevant normal and abnormal target anatomy and acquiring images with high frequency ultrasound in addition to data necessary developing optimal radiation treatment process for the patient. This process includes verification of the treatment port(s) and proper treatment positioning. All treatment ports were photographed within electronic medical record. The patient’s customized lead blocking along with gross tumor volume and margin was confirmed. Considering superficial radiotherapy is clinical in setup, this requires physician and radiation therapist to clarify location interest being treated against initial images, pathology and patient anatomy. Care was taken ensuring fields treated were geometrically accurate and properly positioned using therapeutic radiology simulation-aided field setting verification per fraction. This process is also utilized to determine if any prescription or setup changes are necessary. These steps are therefore medically necessary ensuring safe and effective administration of radiation. Ongoing therapeutic radiology simulation-aided field setting verification is ordered throughout course of therapy.\\nA high frequency ultrasound image was acquired today for two-dimensional evaluation of the tumor volume and response to treatment, in addition to geometric accuracy of field placement. US depth is 1.83 mm, which is 0.59 mm in difference from previous imaging. The field placement and ultrasound imaging, per fraction, is separate and distinct from the initial simulation, and is an important task in providing safe administration of superficial radiation therapy. Physician evaluation of the ultrasound tumor depth will be ongoing throughout the course of treatment and is deemed medically necessary in order to ensure the efficacy of treatment and any necessary changes. Today’s image was evaluated for determination of continuation of treatment with the current plan or with necessary changes as appropriate. According to provider review of verification therapeutic radiology simulation-aided field setting and imaging, NO change is required . Additionally, the use of ultrasound visualization and targeted assessment allows the patient to be able to see their cancer(s) progress, encouraging patient to complete and maintain compliance through full course of radiotherapy. Per Dr. Martinez, continued ultrasound guidance and therapeutic radiology simulation-aided field setting verification per fraction is required for field placement, measurement of tumor depth, progress, and acute effect monitoring. \\n
Detail Level: Zone

## 2022-01-12 NOTE — PROCEDURE: SUPERFICIAL RADIATION TREATMENT
Bill For Simulation And Treatment Device Design: Yes - (Intermediate Simulation: 16397) Bill For Simulation And Treatment Device Design: Yes - (Intermediate Simulation: 71035)

## 2022-01-18 ENCOUNTER — APPOINTMENT (OUTPATIENT)
Dept: URBAN - METROPOLITAN AREA CLINIC 268 | Age: 77
Setting detail: DERMATOLOGY
End: 2022-02-16

## 2022-01-18 PROBLEM — C44.311 BASAL CELL CARCINOMA OF SKIN OF NOSE: Status: ACTIVE | Noted: 2022-01-18

## 2022-01-18 PROBLEM — C44.319 BASAL CELL CARCINOMA OF SKIN OF OTHER PARTS OF FACE: Status: ACTIVE | Noted: 2022-01-18

## 2022-01-18 PROCEDURE — 77280 THER RAD SIMULAJ FIELD SMPL: CPT

## 2022-01-18 PROCEDURE — 77401 RADIATION TX DELIVERY SUPFC: CPT

## 2022-01-18 PROCEDURE — G6001 ECHO GUIDANCE RADIOTHERAPY: HCPCS

## 2022-01-18 PROCEDURE — OTHER SUPERFICIAL RADIATION TREATMENT: OTHER

## 2022-01-18 PROCEDURE — OTHER TREATMENT REGIMEN: OTHER

## 2022-01-18 NOTE — PROCEDURE: SUPERFICIAL RADIATION TREATMENT
Bill For Simulation And Treatment Device Design: Yes - (Intermediate Simulation: 36104) Bill For Simulation And Treatment Device Design: Yes - (Intermediate Simulation: 61482)

## 2022-01-18 NOTE — PROCEDURE: TREATMENT REGIMEN
Plan: This patient has been treated today with image guided superficial radiation therapy for non-melanoma skin cancer. Written informed consent has been previously obtained from this patient for this treatment. This consent is documented in the patient’s chart. The patient gave verbal consent to continue treatment today. The patient was treated with a specific radiation dose and setup as prescribed by the provider listed on this visit note. A Radiation Therapist performed administration of radiation under supervision of provider. The treatment parameters and cumulative dose are indicated above. Prior to administering the radiation, the patient underwent a verification therapeutic radiology simulation-aided field setting defining relevant normal and abnormal target anatomy and acquiring images with high frequency ultrasound in addition to data necessary developing optimal radiation treatment process for the patient. This process includes verification of the treatment port(s) and proper treatment positioning. All treatment ports were photographed within electronic medical record. The patient’s customized lead blocking along with gross tumor volume and margin was confirmed. Considering superficial radiotherapy is clinical in setup, this requires physician and radiation therapist to clarify location interest being treated against initial images, pathology and patient anatomy. Care was taken ensuring fields treated were geometrically accurate and properly positioned using therapeutic radiology simulation-aided field setting verification per fraction. This process is also utilized to determine if any prescription or setup changes are necessary. These steps are therefore medically necessary ensuring safe and effective administration of radiation. Ongoing therapeutic radiology simulation-aided field setting verification is ordered throughout course of therapy.\\nA high frequency ultrasound image was acquired today for two-dimensional evaluation of the tumor volume and response to treatment, in addition to geometric accuracy of field placement. US depth is 1.16 mm, which is 0.67 mm in difference from previous imaging. The field placement and ultrasound imaging, per fraction, is separate and distinct from the initial simulation, and is an important task in providing safe administration of superficial radiation therapy. Physician evaluation of the ultrasound tumor depth will be ongoing throughout the course of treatment and is deemed medically necessary in order to ensure the efficacy of treatment and any necessary changes. Today’s image was evaluated for determination of continuation of treatment with the current plan or with necessary changes as appropriate. According to provider review of verification therapeutic radiology simulation-aided field setting and imaging, NO change is required . Additionally, the use of ultrasound visualization and targeted assessment allows the patient to be able to see their cancer(s) progress, encouraging patient to complete and maintain compliance through full course of radiotherapy. Per Dr. Martinez, continued ultrasound guidance and therapeutic radiology simulation-aided field setting verification per fraction is required for field placement, measurement of tumor depth, progress, and acute effect monitoring. \\n
Detail Level: Zone
Plan: This patient has been treated today with image guided superficial radiation therapy for non-melanoma skin cancer. Written informed consent has been previously obtained from this patient for this treatment. This consent is documented in the patient’s chart. The patient gave verbal consent to continue treatment today. The patient was treated with a specific radiation dose and setup as prescribed by the provider listed on this visit note. A Radiation Therapist performed administration of radiation under supervision of provider. The treatment parameters and cumulative dose are indicated above. Prior to administering the radiation, the patient underwent a verification therapeutic radiology simulation-aided field setting defining relevant normal and abnormal target anatomy and acquiring images with high frequency ultrasound in addition to data necessary developing optimal radiation treatment process for the patient. This process includes verification of the treatment port(s) and proper treatment positioning. All treatment ports were photographed within electronic medical record. The patient’s customized lead blocking along with gross tumor volume and margin was confirmed. Considering superficial radiotherapy is clinical in setup, this requires physician and radiation therapist to clarify location interest being treated against initial images, pathology and patient anatomy. Care was taken ensuring fields treated were geometrically accurate and properly positioned using therapeutic radiology simulation-aided field setting verification per fraction. This process is also utilized to determine if any prescription or setup changes are necessary. These steps are therefore medically necessary ensuring safe and effective administration of radiation. Ongoing therapeutic radiology simulation-aided field setting verification is ordered throughout course of therapy.\\nA high frequency ultrasound image was acquired today for two-dimensional evaluation of the tumor volume and response to treatment, in addition to geometric accuracy of field placement. US depth is  1.29 mm, which is 0.36 mm in difference from previous imaging. The field placement and ultrasound imaging, per fraction, is separate and distinct from the initial simulation, and is an important task in providing safe administration of superficial radiation therapy. Physician evaluation of the ultrasound tumor depth will be ongoing throughout the course of treatment and is deemed medically necessary in order to ensure the efficacy of treatment and any necessary changes. Today’s image was evaluated for determination of continuation of treatment with the current plan or with necessary changes as appropriate. According to provider review of verification therapeutic radiology simulation-aided field setting and imaging, NO change necessary. Additionally, the use of ultrasound visualization and targeted assessment allows the patient to be able to see their cancer(s) progress, encouraging patient to complete and maintain compliance through full course of radiotherapy. Per Dr. Martinez, continued ultrasound guidance and therapeutic radiology simulation-aided field setting verification per fraction is required for field placement, measurement of tumor depth, progress, and acute effect monitoring. \\n

## 2022-01-19 ENCOUNTER — APPOINTMENT (OUTPATIENT)
Dept: URBAN - METROPOLITAN AREA CLINIC 268 | Age: 77
Setting detail: DERMATOLOGY
End: 2022-01-19

## 2022-01-19 PROBLEM — C44.319 BASAL CELL CARCINOMA OF SKIN OF OTHER PARTS OF FACE: Status: ACTIVE | Noted: 2022-01-19

## 2022-01-19 PROBLEM — C44.311 BASAL CELL CARCINOMA OF SKIN OF NOSE: Status: ACTIVE | Noted: 2022-01-19

## 2022-01-19 PROCEDURE — 77401 RADIATION TX DELIVERY SUPFC: CPT

## 2022-01-19 PROCEDURE — OTHER SUPERFICIAL RADIATION TREATMENT: OTHER

## 2022-01-19 PROCEDURE — 77280 THER RAD SIMULAJ FIELD SMPL: CPT

## 2022-01-19 PROCEDURE — G6001 ECHO GUIDANCE RADIOTHERAPY: HCPCS

## 2022-01-19 PROCEDURE — OTHER TREATMENT REGIMEN: OTHER

## 2022-01-19 NOTE — PROCEDURE: TREATMENT REGIMEN
Plan: This patient has been treated today with image guided superficial radiation therapy for non-melanoma skin cancer. Written informed consent has been previously obtained from this patient for this treatment. This consent is documented in the patient’s chart. The patient gave verbal consent to continue treatment today. The patient was treated with a specific radiation dose and setup as prescribed by the provider listed on this visit note. A Radiation Therapist performed administration of radiation under supervision of provider. The treatment parameters and cumulative dose are indicated above. Prior to administering the radiation, the patient underwent a verification therapeutic radiology simulation-aided field setting defining relevant normal and abnormal target anatomy and acquiring images with high frequency ultrasound in addition to data necessary developing optimal radiation treatment process for the patient. This process includes verification of the treatment port(s) and proper treatment positioning. All treatment ports were photographed within electronic medical record. The patient’s customized lead blocking along with gross tumor volume and margin was confirmed. Considering superficial radiotherapy is clinical in setup, this requires physician and radiation therapist to clarify location interest being treated against initial images, pathology and patient anatomy. Care was taken ensuring fields treated were geometrically accurate and properly positioned using therapeutic radiology simulation-aided field setting verification per fraction. This process is also utilized to determine if any prescription or setup changes are necessary. These steps are therefore medically necessary ensuring safe and effective administration of radiation. Ongoing therapeutic radiology simulation-aided field setting verification is ordered throughout course of therapy.\\nA high frequency ultrasound image was acquired today for two-dimensional evaluation of the tumor volume and response to treatment, in addition to geometric accuracy of field placement. US depth is  1.57 mm, which is 0.18 mm in difference from previous imaging. The field placement and ultrasound imaging, per fraction, is separate and distinct from the initial simulation, and is an important task in providing safe administration of superficial radiation therapy. Physician evaluation of the ultrasound tumor depth will be ongoing throughout the course of treatment and is deemed medically necessary in order to ensure the efficacy of treatment and any necessary changes. Today’s image was evaluated for determination of continuation of treatment with the current plan or with necessary changes as appropriate. According to provider review of verification therapeutic radiology simulation-aided field setting and imaging, NO change necessary. Additionally, the use of ultrasound visualization and targeted assessment allows the patient to be able to see their cancer(s) progress, encouraging patient to complete and maintain compliance through full course of radiotherapy. Per Dr. Martinez, continued ultrasound guidance and therapeutic radiology simulation-aided field setting verification per fraction is required for field placement, measurement of tumor depth, progress, and acute effect monitoring. \\n
Plan: This patient has been treated today with image guided superficial radiation therapy for non-melanoma skin cancer. Written informed consent has been previously obtained from this patient for this treatment. This consent is documented in the patient’s chart. The patient gave verbal consent to continue treatment today. The patient was treated with a specific radiation dose and setup as prescribed by the provider listed on this visit note. A Radiation Therapist performed administration of radiation under supervision of provider. The treatment parameters and cumulative dose are indicated above. Prior to administering the radiation, the patient underwent a verification therapeutic radiology simulation-aided field setting defining relevant normal and abnormal target anatomy and acquiring images with high frequency ultrasound in addition to data necessary developing optimal radiation treatment process for the patient. This process includes verification of the treatment port(s) and proper treatment positioning. All treatment ports were photographed within electronic medical record. The patient’s customized lead blocking along with gross tumor volume and margin was confirmed. Considering superficial radiotherapy is clinical in setup, this requires physician and radiation therapist to clarify location interest being treated against initial images, pathology and patient anatomy. Care was taken ensuring fields treated were geometrically accurate and properly positioned using therapeutic radiology simulation-aided field setting verification per fraction. This process is also utilized to determine if any prescription or setup changes are necessary. These steps are therefore medically necessary ensuring safe and effective administration of radiation. Ongoing therapeutic radiology simulation-aided field setting verification is ordered throughout course of therapy.\\nA high frequency ultrasound image was acquired today for two-dimensional evaluation of the tumor volume and response to treatment, in addition to geometric accuracy of field placement. US depth is 1.85 mm, which is 0.69 mm in difference from previous imaging. The field placement and ultrasound imaging, per fraction, is separate and distinct from the initial simulation, and is an important task in providing safe administration of superficial radiation therapy. Physician evaluation of the ultrasound tumor depth will be ongoing throughout the course of treatment and is deemed medically necessary in order to ensure the efficacy of treatment and any necessary changes. Today’s image was evaluated for determination of continuation of treatment with the current plan or with necessary changes as appropriate. According to provider review of verification therapeutic radiology simulation-aided field setting and imaging, NO change is required . Additionally, the use of ultrasound visualization and targeted assessment allows the patient to be able to see their cancer(s) progress, encouraging patient to complete and maintain compliance through full course of radiotherapy. Per Dr. Martinez, continued ultrasound guidance and therapeutic radiology simulation-aided field setting verification per fraction is required for field placement, measurement of tumor depth, progress, and acute effect monitoring. \\n
Detail Level: Zone

## 2022-01-19 NOTE — PROCEDURE: SUPERFICIAL RADIATION TREATMENT
Bill For Simulation And Treatment Device Design: Yes - (Intermediate Simulation: 08102) Bill For Simulation And Treatment Device Design: Yes - (Intermediate Simulation: 87998)

## 2022-01-20 ENCOUNTER — APPOINTMENT (OUTPATIENT)
Dept: URBAN - METROPOLITAN AREA CLINIC 268 | Age: 77
Setting detail: DERMATOLOGY
End: 2022-01-20

## 2022-01-20 PROBLEM — C44.319 BASAL CELL CARCINOMA OF SKIN OF OTHER PARTS OF FACE: Status: ACTIVE | Noted: 2022-01-20

## 2022-01-20 PROBLEM — C44.311 BASAL CELL CARCINOMA OF SKIN OF NOSE: Status: ACTIVE | Noted: 2022-01-20

## 2022-01-20 PROCEDURE — OTHER SUPERFICIAL RADIATION TREATMENT: OTHER

## 2022-01-20 PROCEDURE — OTHER TREATMENT REGIMEN: OTHER

## 2022-01-20 PROCEDURE — G6001 ECHO GUIDANCE RADIOTHERAPY: HCPCS

## 2022-01-20 PROCEDURE — 77401 RADIATION TX DELIVERY SUPFC: CPT

## 2022-01-20 PROCEDURE — 77280 THER RAD SIMULAJ FIELD SMPL: CPT

## 2022-01-20 NOTE — PROCEDURE: SUPERFICIAL RADIATION TREATMENT
Bill For Simulation And Treatment Device Design: Yes - (Intermediate Simulation: 67844) Bill For Simulation And Treatment Device Design: Yes - (Intermediate Simulation: 05087)

## 2022-01-20 NOTE — PROCEDURE: TREATMENT REGIMEN
Detail Level: Zone
Plan: This patient has been treated today with image guided superficial radiation therapy for non-melanoma skin cancer. Written informed consent has been previously obtained from this patient for this treatment. This consent is documented in the patient’s chart. The patient gave verbal consent to continue treatment today. The patient was treated with a specific radiation dose and setup as prescribed by the provider listed on this visit note. A Radiation Therapist performed administration of radiation under supervision of provider. The treatment parameters and cumulative dose are indicated above. Prior to administering the radiation, the patient underwent a verification therapeutic radiology simulation-aided field setting defining relevant normal and abnormal target anatomy and acquiring images with high frequency ultrasound in addition to data necessary developing optimal radiation treatment process for the patient. This process includes verification of the treatment port(s) and proper treatment positioning. All treatment ports were photographed within electronic medical record. The patient’s customized lead blocking along with gross tumor volume and margin was confirmed. Considering superficial radiotherapy is clinical in setup, this requires physician and radiation therapist to clarify location interest being treated against initial images, pathology and patient anatomy. Care was taken ensuring fields treated were geometrically accurate and properly positioned using therapeutic radiology simulation-aided field setting verification per fraction. This process is also utilized to determine if any prescription or setup changes are necessary. These steps are therefore medically necessary ensuring safe and effective administration of radiation. Ongoing therapeutic radiology simulation-aided field setting verification is ordered throughout course of therapy.\\nA high frequency ultrasound image was acquired today for two-dimensional evaluation of the tumor volume and response to treatment, in addition to geometric accuracy of field placement. US depth is 1.91 mm, which is 0.06 mm in difference from previous imaging. The field placement and ultrasound imaging, per fraction, is separate and distinct from the initial simulation, and is an important task in providing safe administration of superficial radiation therapy. Physician evaluation of the ultrasound tumor depth will be ongoing throughout the course of treatment and is deemed medically necessary in order to ensure the efficacy of treatment and any necessary changes. Today’s image was evaluated for determination of continuation of treatment with the current plan or with necessary changes as appropriate. According to provider review of verification therapeutic radiology simulation-aided field setting and imaging, NO change is required . Additionally, the use of ultrasound visualization and targeted assessment allows the patient to be able to see their cancer(s) progress, encouraging patient to complete and maintain compliance through full course of radiotherapy. Per Dr. Martinez, continued ultrasound guidance and therapeutic radiology simulation-aided field setting verification per fraction is required for field placement, measurement of tumor depth, progress, and acute effect monitoring. \\n
Plan: This patient has been treated today with image guided superficial radiation therapy for non-melanoma skin cancer. Written informed consent has been previously obtained from this patient for this treatment. This consent is documented in the patient’s chart. The patient gave verbal consent to continue treatment today. The patient was treated with a specific radiation dose and setup as prescribed by the provider listed on this visit note. A Radiation Therapist performed administration of radiation under supervision of provider. The treatment parameters and cumulative dose are indicated above. Prior to administering the radiation, the patient underwent a verification therapeutic radiology simulation-aided field setting defining relevant normal and abnormal target anatomy and acquiring images with high frequency ultrasound in addition to data necessary developing optimal radiation treatment process for the patient. This process includes verification of the treatment port(s) and proper treatment positioning. All treatment ports were photographed within electronic medical record. The patient’s customized lead blocking along with gross tumor volume and margin was confirmed. Considering superficial radiotherapy is clinical in setup, this requires physician and radiation therapist to clarify location interest being treated against initial images, pathology and patient anatomy. Care was taken ensuring fields treated were geometrically accurate and properly positioned using therapeutic radiology simulation-aided field setting verification per fraction. This process is also utilized to determine if any prescription or setup changes are necessary. These steps are therefore medically necessary ensuring safe and effective administration of radiation. Ongoing therapeutic radiology simulation-aided field setting verification is ordered throughout course of therapy.\\nA high frequency ultrasound image was acquired today for two-dimensional evaluation of the tumor volume and response to treatment, in addition to geometric accuracy of field placement. US depth is  1.73 mm, which is 0.16 mm in difference from previous imaging. The field placement and ultrasound imaging, per fraction, is separate and distinct from the initial simulation, and is an important task in providing safe administration of superficial radiation therapy. Physician evaluation of the ultrasound tumor depth will be ongoing throughout the course of treatment and is deemed medically necessary in order to ensure the efficacy of treatment and any necessary changes. Today’s image was evaluated for determination of continuation of treatment with the current plan or with necessary changes as appropriate. According to provider review of verification therapeutic radiology simulation-aided field setting and imaging, NO change necessary. Additionally, the use of ultrasound visualization and targeted assessment allows the patient to be able to see their cancer(s) progress, encouraging patient to complete and maintain compliance through full course of radiotherapy. Per Dr. Martinez, continued ultrasound guidance and therapeutic radiology simulation-aided field setting verification per fraction is required for field placement, measurement of tumor depth, progress, and acute effect monitoring. \\n

## 2022-01-24 ENCOUNTER — APPOINTMENT (OUTPATIENT)
Dept: URBAN - METROPOLITAN AREA CLINIC 268 | Age: 77
Setting detail: DERMATOLOGY
End: 2022-01-26

## 2022-01-24 ENCOUNTER — APPOINTMENT (OUTPATIENT)
Dept: URBAN - METROPOLITAN AREA CLINIC 268 | Age: 77
Setting detail: DERMATOLOGY
End: 2022-02-22

## 2022-01-24 PROBLEM — C44.310 BASAL CELL CARCINOMA OF SKIN OF UNSPECIFIED PARTS OF FACE: Status: ACTIVE | Noted: 2022-01-24

## 2022-01-24 PROCEDURE — OTHER SUPERFICIAL RADIATION TREATMENT: OTHER

## 2022-01-24 PROCEDURE — 77280 THER RAD SIMULAJ FIELD SMPL: CPT

## 2022-01-24 PROCEDURE — G6001 ECHO GUIDANCE RADIOTHERAPY: HCPCS

## 2022-01-24 PROCEDURE — 77401 RADIATION TX DELIVERY SUPFC: CPT

## 2022-01-24 PROCEDURE — OTHER TREATMENT REGIMEN: OTHER

## 2022-01-24 PROCEDURE — 77336 RADIATION PHYSICS CONSULT: CPT

## 2022-01-24 NOTE — PROCEDURE: SUPERFICIAL RADIATION TREATMENT
Bill For Simulation And Treatment Device Design: Yes - (Intermediate Simulation: [id number]) Bill For Simulation And Treatment Device Design: Yes - (Intermediate Simulation: [id number])

## 2022-01-24 NOTE — PROCEDURE: TREATMENT REGIMEN
Detail Level: Zone
Plan: This patient has been treated today with image guided superficial radiation therapy for non-melanoma skin cancer. Written informed consent has been previously obtained from this patient for this treatment. This consent is documented in the patient’s chart. The patient gave verbal consent to continue treatment today. The patient was treated with a specific radiation dose and setup as prescribed by the provider listed on this visit note. A Radiation Therapist performed administration of radiation under supervision of provider. The treatment parameters and cumulative dose are indicated above. Prior to administering the radiation, the patient underwent a verification therapeutic radiology simulation-aided field setting defining relevant normal and abnormal target anatomy and acquiring images with high frequency ultrasound in addition to data necessary developing optimal radiation treatment process for the patient. This process includes verification of the treatment port(s) and proper treatment positioning. All treatment ports were photographed within electronic medical record. The patient’s customized lead blocking along with gross tumor volume and margin was confirmed. Considering superficial radiotherapy is clinical in setup, this requires physician and radiation therapist to clarify location interest being treated against initial images, pathology and patient anatomy. Care was taken ensuring fields treated were geometrically accurate and properly positioned using therapeutic radiology simulation-aided field setting verification per fraction. This process is also utilized to determine if any prescription or setup changes are necessary. These steps are therefore medically necessary ensuring safe and effective administration of radiation. Ongoing therapeutic radiology simulation-aided field setting verification is ordered throughout course of therapy.\\nA high frequency ultrasound image was acquired today for two-dimensional evaluation of the tumor volume and response to treatment, in addition to geometric accuracy of field placement. US depth is  1.72 mm, which is 0.01 mm in difference from previous imaging. The field placement and ultrasound imaging, per fraction, is separate and distinct from the initial simulation, and is an important task in providing safe administration of superficial radiation therapy. Physician evaluation of the ultrasound tumor depth will be ongoing throughout the course of treatment and is deemed medically necessary in order to ensure the efficacy of treatment and any necessary changes. Today’s image was evaluated for determination of continuation of treatment with the current plan or with necessary changes as appropriate. According to provider review of verification therapeutic radiology simulation-aided field setting and imaging, NO change necessary. Additionally, the use of ultrasound visualization and targeted assessment allows the patient to be able to see their cancer(s) progress, encouraging patient to complete and maintain compliance through full course of radiotherapy. Per Dr. Martinez, continued ultrasound guidance and therapeutic radiology simulation-aided field setting verification per fraction is required for field placement, measurement of tumor depth, progress, and acute effect monitoring. \\n

## 2022-01-25 ENCOUNTER — APPOINTMENT (OUTPATIENT)
Dept: URBAN - METROPOLITAN AREA CLINIC 268 | Age: 77
Setting detail: DERMATOLOGY
End: 2022-01-25

## 2022-01-25 PROBLEM — C44.310 BASAL CELL CARCINOMA OF SKIN OF UNSPECIFIED PARTS OF FACE: Status: ACTIVE | Noted: 2022-01-25

## 2022-01-25 PROCEDURE — 77280 THER RAD SIMULAJ FIELD SMPL: CPT

## 2022-01-25 PROCEDURE — G6001 ECHO GUIDANCE RADIOTHERAPY: HCPCS

## 2022-01-25 PROCEDURE — 77401 RADIATION TX DELIVERY SUPFC: CPT

## 2022-01-25 PROCEDURE — OTHER SUPERFICIAL RADIATION TREATMENT: OTHER

## 2022-01-25 PROCEDURE — OTHER TREATMENT REGIMEN: OTHER

## 2022-01-25 NOTE — PROCEDURE: SUPERFICIAL RADIATION TREATMENT
Bill For Simulation And Treatment Device Design: Yes - (Intermediate Simulation: 90395) Bill For Simulation And Treatment Device Design: Yes - (Intermediate Simulation: [id number])

## 2022-01-25 NOTE — PROCEDURE: TREATMENT REGIMEN
Plan: This patient has been treated today with image guided superficial radiation therapy for non-melanoma skin cancer. Written informed consent has been previously obtained from this patient for this treatment. This consent is documented in the patient’s chart. The patient gave verbal consent to continue treatment today. The patient was treated with a specific radiation dose and setup as prescribed by the provider listed on this visit note. A Radiation Therapist performed administration of radiation under supervision of provider. The treatment parameters and cumulative dose are indicated above. Prior to administering the radiation, the patient underwent a verification therapeutic radiology simulation-aided field setting defining relevant normal and abnormal target anatomy and acquiring images with high frequency ultrasound in addition to data necessary developing optimal radiation treatment process for the patient. This process includes verification of the treatment port(s) and proper treatment positioning. All treatment ports were photographed within electronic medical record. The patient’s customized lead blocking along with gross tumor volume and margin was confirmed. Considering superficial radiotherapy is clinical in setup, this requires physician and radiation therapist to clarify location interest being treated against initial images, pathology and patient anatomy. Care was taken ensuring fields treated were geometrically accurate and properly positioned using therapeutic radiology simulation-aided field setting verification per fraction. This process is also utilized to determine if any prescription or setup changes are necessary. These steps are therefore medically necessary ensuring safe and effective administration of radiation. Ongoing therapeutic radiology simulation-aided field setting verification is ordered throughout course of therapy.\\nA high frequency ultrasound image was acquired today for two-dimensional evaluation of the tumor volume and response to treatment, in addition to geometric accuracy of field placement. US depth is  0.99  mm, which is 0.73 mm in difference from previous imaging. The field placement and ultrasound imaging, per fraction, is separate and distinct from the initial simulation, and is an important task in providing safe administration of superficial radiation therapy. Physician evaluation of the ultrasound tumor depth will be ongoing throughout the course of treatment and is deemed medically necessary in order to ensure the efficacy of treatment and any necessary changes. Today’s image was evaluated for determination of continuation of treatment with the current plan or with necessary changes as appropriate. According to provider review of verification therapeutic radiology simulation-aided field setting and imaging, NO change necessary. Additionally, the use of ultrasound visualization and targeted assessment allows the patient to be able to see their cancer(s) progress, encouraging patient to complete and maintain compliance through full course of radiotherapy. Per Dr. Martinez, continued ultrasound guidance and therapeutic radiology simulation-aided field setting verification per fraction is required for field placement, measurement of tumor depth, progress, and acute effect monitoring. \\n
Detail Level: Zone

## 2022-01-26 ENCOUNTER — APPOINTMENT (OUTPATIENT)
Dept: URBAN - METROPOLITAN AREA CLINIC 268 | Age: 77
Setting detail: DERMATOLOGY
End: 2022-02-09

## 2022-01-26 PROBLEM — C44.310 BASAL CELL CARCINOMA OF SKIN OF UNSPECIFIED PARTS OF FACE: Status: ACTIVE | Noted: 2022-01-26

## 2022-01-26 PROCEDURE — OTHER TREATMENT REGIMEN: OTHER

## 2022-01-26 PROCEDURE — 77401 RADIATION TX DELIVERY SUPFC: CPT

## 2022-01-26 PROCEDURE — 77280 THER RAD SIMULAJ FIELD SMPL: CPT

## 2022-01-26 PROCEDURE — G6001 ECHO GUIDANCE RADIOTHERAPY: HCPCS

## 2022-01-26 PROCEDURE — OTHER SUPERFICIAL RADIATION TREATMENT: OTHER

## 2022-01-26 NOTE — PROCEDURE: TREATMENT REGIMEN
Plan: This patient has been treated today with image guided superficial radiation therapy for non-melanoma skin cancer. Written informed consent has been previously obtained from this patient for this treatment. This consent is documented in the patient’s chart. The patient gave verbal consent to continue treatment today. The patient was treated with a specific radiation dose and setup as prescribed by the provider listed on this visit note. A Radiation Therapist performed administration of radiation under supervision of provider. The treatment parameters and cumulative dose are indicated above. Prior to administering the radiation, the patient underwent a verification therapeutic radiology simulation-aided field setting defining relevant normal and abnormal target anatomy and acquiring images with high frequency ultrasound in addition to data necessary developing optimal radiation treatment process for the patient. This process includes verification of the treatment port(s) and proper treatment positioning. All treatment ports were photographed within electronic medical record. The patient’s customized lead blocking along with gross tumor volume and margin was confirmed. Considering superficial radiotherapy is clinical in setup, this requires physician and radiation therapist to clarify location interest being treated against initial images, pathology and patient anatomy. Care was taken ensuring fields treated were geometrically accurate and properly positioned using therapeutic radiology simulation-aided field setting verification per fraction. This process is also utilized to determine if any prescription or setup changes are necessary. These steps are therefore medically necessary ensuring safe and effective administration of radiation. Ongoing therapeutic radiology simulation-aided field setting verification is ordered throughout course of therapy.\\nA high frequency ultrasound image was acquired today for two-dimensional evaluation of the tumor volume and response to treatment, in addition to geometric accuracy of field placement. US depth is  1.17  mm, which is 0.18 mm in difference from previous imaging. The field placement and ultrasound imaging, per fraction, is separate and distinct from the initial simulation, and is an important task in providing safe administration of superficial radiation therapy. Physician evaluation of the ultrasound tumor depth will be ongoing throughout the course of treatment and is deemed medically necessary in order to ensure the efficacy of treatment and any necessary changes. Today’s image was evaluated for determination of continuation of treatment with the current plan or with necessary changes as appropriate. According to provider review of verification therapeutic radiology simulation-aided field setting and imaging, NO change necessary. Additionally, the use of ultrasound visualization and targeted assessment allows the patient to be able to see their cancer(s) progress, encouraging patient to complete and maintain compliance through full course of radiotherapy. Per Dr. Martinez, continued ultrasound guidance and therapeutic radiology simulation-aided field setting verification per fraction is required for field placement, measurement of tumor depth, progress, and acute effect monitoring. \\n
Detail Level: Zone

## 2022-01-26 NOTE — PROCEDURE: SUPERFICIAL RADIATION TREATMENT
Bill For Simulation And Treatment Device Design: Yes - (Intermediate Simulation: 86250) Bill For Simulation And Treatment Device Design: Yes - (Intermediate Simulation: 65849)

## 2022-01-26 NOTE — PROCEDURE: SUPERFICIAL RADIATION TREATMENT
Pathology Override (Pathology Will Render As Diagnosis Name If Left Blank): Primary Nodular Basal Cell Carcinoma distributed on Right Inferior Malar Cheek

## 2022-02-24 ENCOUNTER — APPOINTMENT (OUTPATIENT)
Dept: URBAN - METROPOLITAN AREA CLINIC 268 | Age: 77
Setting detail: DERMATOLOGY
End: 2022-03-01

## 2022-02-24 PROBLEM — C44.310 BASAL CELL CARCINOMA OF SKIN OF UNSPECIFIED PARTS OF FACE: Status: ACTIVE | Noted: 2022-02-24

## 2022-02-24 PROBLEM — C44.311 BASAL CELL CARCINOMA OF SKIN OF NOSE: Status: ACTIVE | Noted: 2022-02-24

## 2022-02-24 PROCEDURE — 77427 RADIATION TX MANAGEMENT X5: CPT

## 2022-02-24 PROCEDURE — OTHER SUPERFICIAL RADIATION TREATMENT: OTHER

## 2022-02-24 PROCEDURE — OTHER TREATMENT REGIMEN: OTHER

## 2022-02-24 PROCEDURE — G6001 ECHO GUIDANCE RADIOTHERAPY: HCPCS

## 2022-02-24 NOTE — PROCEDURE: TREATMENT REGIMEN
Plan: Per the request of Dr. Martinez, patient was seen today for a 4 week follow up for Superficial Radiation Therapy (CPT® 20371 vs 9921X). Physician evaluation of the ultrasound tumor depth was ongoing through course of treatment and is deemed medically necessary ensuring efficacy of treatment. All appropriate custom blocking and treatment parameters verified by radiation therapist according to initial simulation. A high frequency ultrasound image was acquired today for three-dimensional evaluation of tumor volume and response to treatment, in addition, geometric accuracy of field placement (CPT® ). Today’s image was evaluated, lesion not detected on ultrasound. Objectively, the treated radiation area was evaluated with regards to erythema, atrophy, scale, crusting, erosion, ulceration, edema, purpura, tenderness, warmth, drainage, and any other findings. Patient to follow up for routine visits. \\nUltrasound 1.23 mm RTOG 0 Plan: Per the request of Dr. Martinez, patient was seen today for a 4 week follow up for Superficial Radiation Therapy (CPT® 06385 vs 9921X). Physician evaluation of the ultrasound tumor depth was ongoing through course of treatment and is deemed medically necessary ensuring efficacy of treatment. All appropriate custom blocking and treatment parameters verified by radiation therapist according to initial simulation. A high frequency ultrasound image was acquired today for three-dimensional evaluation of tumor volume and response to treatment, in addition, geometric accuracy of field placement (CPT® ). Today’s image was evaluated, lesion not detected on ultrasound. Objectively, the treated radiation area was evaluated with regards to erythema, atrophy, scale, crusting, erosion, ulceration, edema, purpura, tenderness, warmth, drainage, and any other findings. Patient to follow up for routine visits. \\nUltrasound 1.23 mm RTOG 0

## 2022-02-24 NOTE — PROCEDURE: TREATMENT REGIMEN
Plan: Per the request of Dr. Martinez, patient was seen today for a 4 week follow up for Superficial Radiation Therapy (CPT® 79555 vs 9921X). Physician evaluation of the ultrasound tumor depth was ongoing through course of treatment and is deemed medically necessary ensuring efficacy of treatment. All appropriate custom blocking and treatment parameters verified by radiation therapist according to initial simulation. A high frequency ultrasound image was acquired today for three-dimensional evaluation of tumor volume and response to treatment, in addition, geometric accuracy of field placement (CPT® ). Today’s image was evaluated, lesion not detected on ultrasound. Objectively, the treated radiation area was evaluated with regards to erythema, atrophy, scale, crusting, erosion, ulceration, edema, purpura, tenderness, warmth, drainage, and any other findings. Patient to follow up for routine visits. \\nUltrasound 0.69 RTOG 0\\n Plan: Per the request of Dr. Martinez, patient was seen today for a 4 week follow up for Superficial Radiation Therapy (CPT® 85485 vs 9921X). Physician evaluation of the ultrasound tumor depth was ongoing through course of treatment and is deemed medically necessary ensuring efficacy of treatment. All appropriate custom blocking and treatment parameters verified by radiation therapist according to initial simulation. A high frequency ultrasound image was acquired today for three-dimensional evaluation of tumor volume and response to treatment, in addition, geometric accuracy of field placement (CPT® ). Today’s image was evaluated, lesion not detected on ultrasound. Objectively, the treated radiation area was evaluated with regards to erythema, atrophy, scale, crusting, erosion, ulceration, edema, purpura, tenderness, warmth, drainage, and any other findings. Patient to follow up for routine visits. \\nUltrasound 0.69 RTOG 0\\n

## 2022-02-24 NOTE — PROCEDURE: SUPERFICIAL RADIATION TREATMENT
Bill For Simulation And Treatment Device Design: Yes - (Intermediate Simulation: 02838) Bill For Simulation And Treatment Device Design: Yes - (Intermediate Simulation: 07552)

## 2022-05-11 ENCOUNTER — OFFICE (OUTPATIENT)
Dept: URBAN - METROPOLITAN AREA CLINIC 67 | Facility: CLINIC | Age: 77
End: 2022-05-11
Payer: COMMERCIAL

## 2022-05-11 VITALS
SYSTOLIC BLOOD PRESSURE: 120 MMHG | WEIGHT: 125 LBS | HEART RATE: 68 BPM | HEIGHT: 64 IN | DIASTOLIC BLOOD PRESSURE: 82 MMHG | OXYGEN SATURATION: 98 %

## 2022-05-11 DIAGNOSIS — R63.4 ABNORMAL WEIGHT LOSS: ICD-10-CM

## 2022-05-11 DIAGNOSIS — R10.13 EPIGASTRIC PAIN: ICD-10-CM

## 2022-05-11 DIAGNOSIS — R19.4 CHANGE IN BOWEL HABIT: ICD-10-CM

## 2022-05-11 PROCEDURE — 99204 OFFICE O/P NEW MOD 45 MIN: CPT

## 2022-05-11 RX ORDER — SODIUM SULFATE, MAGNESIUM SULFATE, AND POTASSIUM CHLORIDE 17.75; 2.7; 2.25 G/1; G/1; G/1
TABLET ORAL
Qty: 1 | Refills: 0 | Status: ACTIVE
Start: 2022-05-11

## 2022-07-01 ENCOUNTER — OFFICE (OUTPATIENT)
Dept: URBAN - METROPOLITAN AREA PATHOLOGY 24 | Facility: PATHOLOGY | Age: 77
End: 2022-07-01
Payer: COMMERCIAL

## 2022-07-01 ENCOUNTER — AMBULATORY SURGICAL CENTER (OUTPATIENT)
Dept: URBAN - METROPOLITAN AREA SURGERY 19 | Facility: SURGERY | Age: 77
End: 2022-07-01
Payer: COMMERCIAL

## 2022-07-01 DIAGNOSIS — K31.89 OTHER DISEASES OF STOMACH AND DUODENUM: ICD-10-CM

## 2022-07-01 DIAGNOSIS — R93.3 ABNORMAL FINDINGS ON DIAGNOSTIC IMAGING OF OTHER PARTS OF DI: ICD-10-CM

## 2022-07-01 DIAGNOSIS — K57.30 DIVERTICULOSIS OF LARGE INTESTINE WITHOUT PERFORATION OR ABS: ICD-10-CM

## 2022-07-01 DIAGNOSIS — R19.4 CHANGE IN BOWEL HABIT: ICD-10-CM

## 2022-07-01 LAB
COLONOSCOPY STUDY: (no result)
COLONOSCOPY STUDY: (no result)
RELEVANT H&P ENDOSCOPY: (no result)
RELEVANT H&P ENDOSCOPY: (no result)

## 2022-07-01 PROCEDURE — 45378 DIAGNOSTIC COLONOSCOPY: CPT | Performed by: SPECIALIST

## 2022-07-01 PROCEDURE — 88305 TISSUE EXAM BY PATHOLOGIST: CPT

## 2022-07-01 PROCEDURE — 88342 IMHCHEM/IMCYTCHM 1ST ANTB: CPT

## 2022-07-01 PROCEDURE — 88313 SPECIAL STAINS GROUP 2: CPT

## 2022-07-01 PROCEDURE — 43239 EGD BIOPSY SINGLE/MULTIPLE: CPT | Mod: 51 | Performed by: SPECIALIST

## 2022-08-21 NOTE — PROCEDURE: SUPERFICIAL RADIATION TREATMENT
Pt admitted with pancreatitis. IV antibiotics administered as ordered. Afebrile. Pain controlled with IV dilaudid. Nausea controlled with IV Zofran. IVF's infusing. Remains on telemetry- NSR. Voiding with difficulties. Lap sites x 4 w/ durmabond. Tolerating FL diet. RA. Ambulatory. Possible d/c Sun or Mon. Progressing toward d/c goals. Safety maintained. Will continue to monitor.    Functional Status: 0 (fully active)

## 2022-12-30 NOTE — PROCEDURE: SUPERFICIAL RADIATION TREATMENT
Spoke with patient's daughter.  States patient needs a portable O2 order sent to [facility].  Wants the portable tank with a strap not portable O2 tanks.   Functional Status: 0 (fully active)

## 2023-01-09 ENCOUNTER — APPOINTMENT (OUTPATIENT)
Dept: URBAN - METROPOLITAN AREA CLINIC 268 | Age: 78
Setting detail: DERMATOLOGY
End: 2023-01-20

## 2023-01-09 ENCOUNTER — APPOINTMENT (OUTPATIENT)
Dept: URBAN - METROPOLITAN AREA CLINIC 268 | Age: 78
Setting detail: DERMATOLOGY
End: 2023-01-09

## 2023-01-09 PROBLEM — C44.311 BASAL CELL CARCINOMA OF SKIN OF NOSE: Status: ACTIVE | Noted: 2023-01-09

## 2023-01-09 PROCEDURE — 99213 OFFICE O/P EST LOW 20 MIN: CPT | Mod: 25

## 2023-01-09 PROCEDURE — OTHER TREATMENT REGIMEN: OTHER

## 2023-01-09 PROCEDURE — 77261 THER RADIOLOGY TX PLNG SMPL: CPT

## 2023-01-09 PROCEDURE — 77332 RADIATION TREATMENT AID(S): CPT

## 2023-01-09 PROCEDURE — 77300 RADIATION THERAPY DOSE PLAN: CPT

## 2023-01-09 PROCEDURE — G6001 ECHO GUIDANCE RADIOTHERAPY: HCPCS

## 2023-01-09 PROCEDURE — 77280 THER RAD SIMULAJ FIELD SMPL: CPT

## 2023-01-09 PROCEDURE — OTHER SUPERFICIAL RADIATION TREATMENT: OTHER

## 2023-01-09 NOTE — PROCEDURE: TREATMENT REGIMEN
Plan: Physician Orders: Initial Simulation Plan: Simulation with per Fraction Ultrasound\\nPlan: Per the request of Dr. Martinez, the patient was seen today for Superficial Radiation Therapy planning\\nrequiring initial simulation in preparation for treatment of specific diseased sites. Simulation is necessary to\\ndetermine the correct patient and treatment portal positioning, to deliver safe and effective radiation\\ntherapy. A high-frequency ultrasound image was acquired prior to treatment today for two- dimensional\\nevaluation of tumor volume and will be repeated per fraction for response to treatment, in addition, to\\ngeometric accuracy of field placement. Physician evaluation of the ultrasound tumor depth, width, and\\nbreadth will be ongoing through the course of treatment and is deemed medically necessary ensuring\\nefficacy of treatment and determine whether to proceed with delivery of therapeutic. Today’s image and\\nsetup were evaluated to determine the initiation of treatment. All appropriate blocking and treatment\\nparameters were verified by the radiation therapist and provider.\\nUltrasound depth is 1.57 mm.\\nPer Dr. Martinez, continued per fraction ultrasound guidance and simulation are required for field placement,\\nmeasurement of tumor depth, width and breadth, progress, and edema monitoring.\\nPer the request of Dr. Martinez, continuing medical physics review as per radiotherapy standard of care post every\\n5th fraction for the patient, including assessment of treatment parameters,  of dose\\ndelivery, and review of patient treatment documentation in support of the provider, is ordered, ensuring\\nefficacy and continued safe delivery of radiotherapy. Included in the physics check is a review of patient\\nsetup information, all pertinent simulation and treatment photograph(s) checks, prescription, dose\\ncalculation verification, per fraction dose charted correctly, elapsed days and treatment days correctly\\ncharted, cumulative dose correct, and review of any prescription changes. Continued medical physics review\\npost every 5th fraction of radiotherapy is requested by the provider for appropriate radiotherapy\\nmanagement and is deemed medically necessary and standard of care.
Detail Level: Zone

## 2023-01-09 NOTE — PROCEDURE: SUPERFICIAL RADIATION TREATMENT
Initial Radiation Treatment Planning (Will Render If Bill Simulation = Yes): The patient had a complete consultation regarding all applicable modalities for the treatment of their skin cancer\\nand based on a variety of factors including the type of tumor, size, and location, the relevant medical history as\\nwell as local tissue factors, the functional status of the individual, the ability to perform necessary postoperative\\nwound instructions and the need for simultaneous treatments as well as overall wound healing status, it was\\ndetermined that the patient would begin radiation therapy treatment for skin cancer. A full simulation and\\ntreatment device design was performed including the determination and formulation of appropriate simple and\\ncomplex devices. The specific field applicator, shields, and devices both simple \\nand complex as well as the specific patient setup is outlined below. \\nThe patient was given a full consent for superficial radiation to both verbally and in writing and the full determination of\\npatient's eligibility for treatment and selection is outlined on the patient eligibility and treatment selection form.\\nThe specific superficial radiotherapy prescription was determined and was documented on the superficial\\nradiotherapy prescription form. A treatment calculation was also performed and documented on the treatment\\ncalculation form. Based on the prescription, the patient was scheduled for a series of fractional treatments

## 2023-01-09 NOTE — PROCEDURE: SUPERFICIAL RADIATION TREATMENT
Custom Shielding Preamble Text Will Not Be Included With Simple Simulations (.......... X X Y Cm): A prefab shield was used

## 2023-01-09 NOTE — PROCEDURE: SUPERFICIAL RADIATION TREATMENT
Bill For Simulation?: Yes - (Simple Simulation: 02105) Bill For Simulation: Yes - (Simple Simulation: 87601)

## 2023-01-09 NOTE — PROCEDURE: SUPERFICIAL RADIATION TREATMENT
Pathology Override (Pathology Will Render As Diagnosis Name If Left Blank): Primary Nodular Basal Cell Carcinoma distributed on the Right Nasal Dorsum

## 2023-01-09 NOTE — PROCEDURE: SUPERFICIAL RADIATION TREATMENT
Simple Simulation Afterword Text Will Be Included With Simple Simulations (Indications............): The patient had a complete consultation regarding all applicable modalities for the treatment of their skin cancer and based on a variety of factors including the type of tumor, size, and location, the relevant medical history as well as local tissue factors, the functional status of the individual, the ability to perform necessary postoperative wound instructions and the need for simultaneous treatments as well as overall wound healing status, it was determined that the patient would begin radiation therapy treatment for skin cancer.  A full simulation and treatment device design was performed including the determination and formulation of appropriate simple and complex devices. The specific field applicator, shields, and devices both simple and complex as well as the specific patient setup is outlined below.  The patient was given a full consent for superficial radiation to both verbally and in writing and the full determination of patient's eligibility for treatment and selection is outlined on the patient eligibility and treatment selection form.  The specific superficial radiotherapy prescription was determined and was documented on the superficial radiotherapy prescription form.  A treatment calculation was also performed and documented on the treatment calculation form.  Based on the prescription, the patient was scheduled for a series of fractional treatments.

## 2023-01-11 ENCOUNTER — APPOINTMENT (OUTPATIENT)
Dept: URBAN - METROPOLITAN AREA CLINIC 268 | Age: 78
Setting detail: DERMATOLOGY
End: 2023-01-13

## 2023-01-11 PROBLEM — C44.311 BASAL CELL CARCINOMA OF SKIN OF NOSE: Status: ACTIVE | Noted: 2023-01-11

## 2023-01-11 PROCEDURE — OTHER TREATMENT REGIMEN: OTHER

## 2023-01-11 PROCEDURE — G6001 ECHO GUIDANCE RADIOTHERAPY: HCPCS | Mod: 59

## 2023-01-11 PROCEDURE — OTHER SUPERFICIAL RADIATION TREATMENT: OTHER

## 2023-01-11 PROCEDURE — 77401 RADIATION TX DELIVERY SUPFC: CPT

## 2023-01-11 NOTE — PROCEDURE: SUPERFICIAL RADIATION TREATMENT
Initial Radiation Treatment Planning (Will Render If Bill Simulation = Yes): The patient had a complete consultation regarding all applicable modalities for the treatment of their skin cancer\\nand based on a variety of factors including the type of tumor, size, and location, the relevant medical history as\\nwell as local tissue factors, the functional status of the individual, the ability to perform necessary postoperative\\nwound instructions and the need for simultaneous treatments as well as overall wound healing status, it was\\ndetermined that the patient would begin radiation therapy treatment for skin cancer. A full simulation and\\ntreatment device design was performed including the determination and formulation of appropriate simple and\\ncomplex devices. The specific field applicator, shields, and devices both simple \\nand complex as well as the specific patient setup is outlined below. \\nThe patient was given a full consent for superficial radiation to both verbally and in writing and the full determination of\\npatient's eligibility for treatment and selection is outlined on the patient eligibility and treatment selection form.\\nThe specific superficial radiotherapy prescription was determined and was documented on the superficial\\nradiotherapy prescription form. A treatment calculation was also performed and documented on the treatment\\ncalculation form. Based on the prescription, the patient was scheduled for a series of fractional treatments No pertinent past medical history

## 2023-01-11 NOTE — PROCEDURE: TREATMENT REGIMEN
Plan: Physician Orders: Radiotherapy Treatment Plan: Superficial Radiotherapy with Ultrasound\\nPlan: This patient has been treated today with image-guided superficial radiation therapy for non-melanoma\\nskin cancer. Written informed consent has been previously obtained from this patient for this treatment. This\\nconsent is documented in the patient's chart. The patient gave verbal consent to continue treatment today.\\nThe patient was treated with a specific radiation dose and setup as prescribed by the provider listed on this\\nvisit note. A Radiation Therapist performed administration of radiation under the supervision of a provider.\\nThe treatment parameters and cumulative dose are indicated above. Prior to administering the radiation, the\\npatient underwent a verification therapeutic radiology simulation-aided field setting defining relevant normal\\nand abnormal target anatomy and acquiring images with a separate and distinct diagnostic high-frequency\\nultrasound to delineate tissues and determine whether to proceed with delivery of therapeutic, in addition to\\nretrieve data necessary to develop an optimal radiation treatment process for the patient. The field\\nplacement simulation documents any change seen in overall tumor volume documented in the patient’s\\nrecord, allows the clinician to indicate any needed changes in the treatment plan and/or prescription,\\nprovides diagnostic evaluation as the basis for performing the therapeutic procedure, and clearly identifies\\nthe information needed to decide to proceed with the therapeutic procedure. This process includes\\nverification of the treatment port(s) and proper treatment positioning. All treatment ports were\\nphotographed within electronic medical records. The patient's lead blocking along with gross tumor volume\\nand margin was confirmed. Considering superficial radiotherapy is clinical in setup, this requires the physician\\nand radiation therapist to clarify the location interest being treated against initial images, ultrasound,\\npathology, and patient anatomy. Care was taken to ensure pedersen treated were geometrically accurate and\\nproperly positioned using therapeutic radiology simulation-aided field setting verification per fraction. This\\nprocess is also utilized to determine if any prescription or setup changes are necessary. These steps are\\ntherefore medically necessary to ensure safe and effective administration of radiation. Ongoing therapeutic\\nradiology simulation-aided field setting verification is ordered throughout the course of therapy.\\nA high-frequency ultrasound image was acquired today for a two-dimensional evaluation of the tumor\\nvolume, depth, width, breadth, review of prior response to treatment, provide geometric accuracy of field\\nplacement, and determine whether to proceed with therapeutic delivery.\\nHigh frequency ultrasound depth is 1.72 mm, which is 0.15 mm in difference from previous imaging.\\nThe field placement and ultrasound imaging, per fraction, is separate and distinct from the initial simulation\\nand is an important task in providing safe administration of superficial radiation therapy. Physician evaluation\\nof the ultrasound information will be ongoing throughout the course of treatment and is deemed medically\\nnecessary to ensure the efficacy of treatment, whether to proceed with therapeutic delivery, and determine\\aviva necessary changes. Today's images were evaluated for determination of continuation of treatment with\\nthe current plan or with necessary changes as appropriate. According to the review of verification\\ntherapeutic radiology simulation-aided field setting and imaging, no change is required.\\nAdditionally, the use of ultrasound visualization and targeted assessment allows the patient to be able to see\\nher cancer progress, encouraging the patient to complete and maintain compliance through the full\\ncourse of prescribed radiotherapy. Per Dr. Martinez, continued ultrasound guidance and therapeutic radiology\\nsimulation-aided field setting verification per fraction is required for field placement, measurement of tumor\\ndepth, tissues evaluation, progress, acute effect monitoring, and determination for therapeutic treatment\\ndelivery is appropriate
Detail Level: Zone

## 2023-01-11 NOTE — PROCEDURE: SUPERFICIAL RADIATION TREATMENT
Is This A New Presentation, Or A Follow-Up?: Nail Disorder How Severe Is It?: moderate Pathology Override (Pathology Will Render As Diagnosis Name If Left Blank): Primary Nodular Basal Cell Carcinoma distributed on the Right Nasal Dorsum

## 2023-01-12 ENCOUNTER — APPOINTMENT (OUTPATIENT)
Dept: URBAN - METROPOLITAN AREA CLINIC 268 | Age: 78
Setting detail: DERMATOLOGY
End: 2023-01-13

## 2023-01-12 PROBLEM — C44.311 BASAL CELL CARCINOMA OF SKIN OF NOSE: Status: ACTIVE | Noted: 2023-01-12

## 2023-01-12 PROCEDURE — OTHER SUPERFICIAL RADIATION TREATMENT: OTHER

## 2023-01-12 PROCEDURE — 77280 THER RAD SIMULAJ FIELD SMPL: CPT

## 2023-01-12 PROCEDURE — G6001 ECHO GUIDANCE RADIOTHERAPY: HCPCS | Mod: XU

## 2023-01-12 PROCEDURE — 77401 RADIATION TX DELIVERY SUPFC: CPT

## 2023-01-12 PROCEDURE — OTHER TREATMENT REGIMEN: OTHER

## 2023-01-12 NOTE — PROCEDURE: SUPERFICIAL RADIATION TREATMENT
Bill For Simulation?: Yes - (Simple Simulation: 86102) Bill For Simulation: Yes - (Simple Simulation: 27874)

## 2023-01-12 NOTE — PROCEDURE: TREATMENT REGIMEN
Plan: Physician Orders: Radiotherapy Treatment Plan: Superficial Radiotherapy with Ultrasound\\nPlan: This patient has been treated today with image-guided superficial radiation therapy for non-melanoma\\nskin cancer. Written informed consent has been previously obtained from this patient for this treatment. This\\nconsent is documented in the patient's chart. The patient gave verbal consent to continue treatment today.\\nThe patient was treated with a specific radiation dose and setup as prescribed by the provider listed on this\\nvisit note. A Radiation Therapist performed administration of radiation under the supervision of a provider.\\nThe treatment parameters and cumulative dose are indicated above. Prior to administering the radiation, the\\npatient underwent a verification therapeutic radiology simulation-aided field setting defining relevant normal\\nand abnormal target anatomy and acquiring images with a separate and distinct diagnostic high-frequency\\nultrasound to delineate tissues and determine whether to proceed with delivery of therapeutic, in addition to\\nretrieve data necessary to develop an optimal radiation treatment process for the patient. The field\\nplacement simulation documents any change seen in overall tumor volume documented in the patient’s\\nrecord, allows the clinician to indicate any needed changes in the treatment plan and/or prescription,\\nprovides diagnostic evaluation as the basis for performing the therapeutic procedure, and clearly identifies\\nthe information needed to decide to proceed with the therapeutic procedure. This process includes\\nverification of the treatment port(s) and proper treatment positioning. All treatment ports were\\nphotographed within electronic medical records. The patient's lead blocking along with gross tumor volume\\nand margin was confirmed. Considering superficial radiotherapy is clinical in setup, this requires the physician\\nand radiation therapist to clarify the location interest being treated against initial images, ultrasound,\\npathology, and patient anatomy. Care was taken to ensure pedersen treated were geometrically accurate and\\nproperly positioned using therapeutic radiology simulation-aided field setting verification per fraction. This\\nprocess is also utilized to determine if any prescription or setup changes are necessary. These steps are\\ntherefore medically necessary to ensure safe and effective administration of radiation. Ongoing therapeutic\\nradiology simulation-aided field setting verification is ordered throughout the course of therapy.\\nA high-frequency ultrasound image was acquired today for a two-dimensional evaluation of the tumor\\nvolume, depth, width, breadth, review of prior response to treatment, provide geometric accuracy of field\\nplacement, and determine whether to proceed with therapeutic delivery.\\nHigh frequency ultrasound depth is 1.81 mm, which is +0.03 mm in difference from previous imaging.\\nThe field placement and ultrasound imaging, per fraction, is separate and distinct from the initial simulation\\nand is an important task in providing safe administration of superficial radiation therapy. Physician evaluation\\nof the ultrasound information will be ongoing throughout the course of treatment and is deemed medically\\nnecessary to ensure the efficacy of treatment, whether to proceed with therapeutic delivery, and determine\\aviva necessary changes. Today's images were evaluated for determination of continuation of treatment with\\nthe current plan or with necessary changes as appropriate. According to the review of verification\\ntherapeutic radiology simulation-aided field setting and imaging, no change is required.\\nAdditionally, the use of ultrasound visualization and targeted assessment allows the patient to be able to see\\nher cancer progress, encouraging the patient to complete and maintain compliance through the full\\ncourse of prescribed radiotherapy. Per Dr. Martinez, continued ultrasound guidance and therapeutic radiology\\nsimulation-aided field setting verification per fraction is required for field placement, measurement of tumor\\ndepth, tissues evaluation, progress, acute effect monitoring, and determination for therapeutic treatment\\ndelivery is appropriate
Detail Level: Zone

## 2023-01-12 NOTE — PROCEDURE: SUPERFICIAL RADIATION TREATMENT
[General Appearance - Alert] : alert [General Appearance - In No Acute Distress] : in no acute distress [Oriented To Time, Place, And Person] : oriented to person, place, and time [Impaired Insight] : insight and judgment were intact [Affect] : the affect was normal [Sclera] : the sclera and conjunctiva were normal [PERRL With Normal Accommodation] : pupils were equal in size, round, reactive to light, with normal accommodation [Extraocular Movements] : extraocular movements were intact [Outer Ear] : the ears and nose were normal in appearance [Oropharynx] : the oropharynx was normal [Neck Appearance] : the appearance of the neck was normal [Neck Cervical Mass (___cm)] : no neck mass was observed Dimensions-X Axis In Cm: 0.5 [Jugular Venous Distention Increased] : there was no jugular-venous distention [Thyroid Diffuse Enlargement] : the thyroid was not enlarged [Thyroid Nodule] : there were no palpable thyroid nodules [Auscultation Breath Sounds / Voice Sounds] : lungs were clear to auscultation bilaterally [Heart Rate And Rhythm] : heart rate was normal and rhythm regular [Heart Sounds] : normal S1 and S2 [Heart Sounds Gallop] : no gallops [Murmurs] : no murmurs [Heart Sounds Pericardial Friction Rub] : no pericardial rub [Arterial Pulses Carotid] : carotid pulses were normal with no bruits [Edema] : there was no peripheral edema [Veins - Varicosity Changes] : there were no varicosital changes [No CVA Tenderness] : no ~M costovertebral angle tenderness [No Spinal Tenderness] : no spinal tenderness [Abnormal Walk] : normal gait [Nail Clubbing] : no clubbing  or cyanosis of the fingernails [Musculoskeletal - Swelling] : no joint swelling seen [Motor Tone] : muscle strength and tone were normal [Skin Color & Pigmentation] : normal skin color and pigmentation [Skin Turgor] : normal skin turgor [] : no rash [FreeTextEntry1] : She looked generally well. Mental status was intact. She was alert, attentive and fully oriented. Speech was fluent and comprehension intact. Memory and fund of knowledge were normal. On cranial nerve exam, I had difficulty seeing the left fundus but the right fundus was within normal limits. The remainder of cranial nerves II through XII was intact. On motor exam tone was normal. All further abnormalities were confined to the left side: arm drift; moderately slow fine finger movements; deltoid 4/5; iliopsoas 3/5 with some discomfort and probably decreased effort; anterior tibialis with mildly decreased range of motion but 5-/5; otherwise, power was within normal limits throughout. Reflexes were 1+ in the right arm, 1+-2+ in the left arm, trace at the right knee, 1+ at the left knee, absent at the right ankle and trace at the left ankle. There were bilateral Babinski signs. Coordination in the arms was intact and was not tested in the legs due to discomfort. She walked well with her walker. Tandem gait and Romberg were not tested. Sensory exam was intact to all modalities.

## 2023-01-16 ENCOUNTER — APPOINTMENT (OUTPATIENT)
Dept: URBAN - METROPOLITAN AREA CLINIC 268 | Age: 78
Setting detail: DERMATOLOGY
End: 2023-01-17

## 2023-01-16 PROBLEM — C44.311 BASAL CELL CARCINOMA OF SKIN OF NOSE: Status: ACTIVE | Noted: 2023-01-16

## 2023-01-16 PROCEDURE — OTHER SUPERFICIAL RADIATION TREATMENT: OTHER

## 2023-01-16 PROCEDURE — 77401 RADIATION TX DELIVERY SUPFC: CPT

## 2023-01-16 PROCEDURE — G6001 ECHO GUIDANCE RADIOTHERAPY: HCPCS | Mod: XU

## 2023-01-16 PROCEDURE — 77280 THER RAD SIMULAJ FIELD SMPL: CPT

## 2023-01-16 PROCEDURE — OTHER TREATMENT REGIMEN: OTHER

## 2023-01-16 NOTE — PROCEDURE: SUPERFICIAL RADIATION TREATMENT
Bill For Simulation: Yes - (Simple Simulation: 70828) Bill For Simulation?: Yes - (Simple Simulation: 45460)

## 2023-01-16 NOTE — PROCEDURE: TREATMENT REGIMEN
Plan: Physician Orders: Radiotherapy Treatment Plan: Superficial Radiotherapy with Ultrasound\\nPlan: This patient has been treated today with image-guided superficial radiation therapy for non-melanoma\\nskin cancer. Written informed consent has been previously obtained from this patient for this treatment. This\\nconsent is documented in the patient's chart. The patient gave verbal consent to continue treatment today.\\nThe patient was treated with a specific radiation dose and setup as prescribed by the provider listed on this\\nvisit note. A Radiation Therapist performed administration of radiation under the supervision of a provider.\\nThe treatment parameters and cumulative dose are indicated above. Prior to administering the radiation, the\\npatient underwent a verification therapeutic radiology simulation-aided field setting defining relevant normal\\nand abnormal target anatomy and acquiring images with a separate and distinct diagnostic high-frequency\\nultrasound to delineate tissues and determine whether to proceed with delivery of therapeutic, in addition to\\nretrieve data necessary to develop an optimal radiation treatment process for the patient. The field\\nplacement simulation documents any change seen in overall tumor volume documented in the patient’s\\nrecord, allows the clinician to indicate any needed changes in the treatment plan and/or prescription,\\nprovides diagnostic evaluation as the basis for performing the therapeutic procedure, and clearly identifies\\nthe information needed to decide to proceed with the therapeutic procedure. This process includes\\nverification of the treatment port(s) and proper treatment positioning. All treatment ports were\\nphotographed within electronic medical records. The patient's lead blocking along with gross tumor volume\\nand margin was confirmed. Considering superficial radiotherapy is clinical in setup, this requires the physician\\nand radiation therapist to clarify the location interest being treated against initial images, ultrasound,\\npathology, and patient anatomy. Care was taken to ensure pedersen treated were geometrically accurate and\\nproperly positioned using therapeutic radiology simulation-aided field setting verification per fraction. This\\nprocess is also utilized to determine if any prescription or setup changes are necessary. These steps are\\ntherefore medically necessary to ensure safe and effective administration of radiation. Ongoing therapeutic\\nradiology simulation-aided field setting verification is ordered throughout the course of therapy.\\nA high-frequency ultrasound image was acquired today for a two-dimensional evaluation of the tumor\\nvolume, depth, width, breadth, review of prior response to treatment, provide geometric accuracy of field\\nplacement, and determine whether to proceed with therapeutic delivery.\\nHigh frequency ultrasound depth is 1.63 mm, which is -0.18 mm in difference from previous imaging.\\nThe field placement and ultrasound imaging, per fraction, is separate and distinct from the initial simulation\\nand is an important task in providing safe administration of superficial radiation therapy. Physician evaluation\\nof the ultrasound information will be ongoing throughout the course of treatment and is deemed medically\\nnecessary to ensure the efficacy of treatment, whether to proceed with therapeutic delivery, and determine\\aviva necessary changes. Today's images were evaluated for determination of continuation of treatment with\\nthe current plan or with necessary changes as appropriate. According to the review of verification\\ntherapeutic radiology simulation-aided field setting and imaging, no change is required.\\nAdditionally, the use of ultrasound visualization and targeted assessment allows the patient to be able to see\\nher cancer progress, encouraging the patient to complete and maintain compliance through the full\\ncourse of prescribed radiotherapy. Per Dr. Martinez, continued ultrasound guidance and therapeutic radiology\\nsimulation-aided field setting verification per fraction is required for field placement, measurement of tumor\\ndepth, tissues evaluation, progress, acute effect monitoring, and determination for therapeutic treatment\\ndelivery is appropriate
Detail Level: Zone

## 2023-01-17 ENCOUNTER — APPOINTMENT (OUTPATIENT)
Dept: URBAN - METROPOLITAN AREA CLINIC 268 | Age: 78
Setting detail: DERMATOLOGY
End: 2023-01-18

## 2023-01-17 PROBLEM — C44.311 BASAL CELL CARCINOMA OF SKIN OF NOSE: Status: ACTIVE | Noted: 2023-01-17

## 2023-01-17 PROCEDURE — OTHER TREATMENT REGIMEN: OTHER

## 2023-01-17 PROCEDURE — 77401 RADIATION TX DELIVERY SUPFC: CPT

## 2023-01-17 PROCEDURE — OTHER SUPERFICIAL RADIATION TREATMENT: OTHER

## 2023-01-17 PROCEDURE — 77280 THER RAD SIMULAJ FIELD SMPL: CPT

## 2023-01-17 PROCEDURE — G6001 ECHO GUIDANCE RADIOTHERAPY: HCPCS | Mod: XU

## 2023-01-17 NOTE — PROCEDURE: SUPERFICIAL RADIATION TREATMENT
Bill For Simulation?: Yes - (Simple Simulation: 15869) Bill For Simulation: Yes - (Simple Simulation: 72575)

## 2023-01-18 ENCOUNTER — APPOINTMENT (OUTPATIENT)
Dept: URBAN - METROPOLITAN AREA CLINIC 268 | Age: 78
Setting detail: DERMATOLOGY
End: 2023-01-20

## 2023-01-18 PROBLEM — C44.311 BASAL CELL CARCINOMA OF SKIN OF NOSE: Status: ACTIVE | Noted: 2023-01-18

## 2023-01-18 PROCEDURE — 77401 RADIATION TX DELIVERY SUPFC: CPT

## 2023-01-18 PROCEDURE — 77280 THER RAD SIMULAJ FIELD SMPL: CPT

## 2023-01-18 PROCEDURE — G6001 ECHO GUIDANCE RADIOTHERAPY: HCPCS | Mod: XU

## 2023-01-18 PROCEDURE — OTHER SUPERFICIAL RADIATION TREATMENT: OTHER

## 2023-01-18 PROCEDURE — OTHER TREATMENT REGIMEN: OTHER

## 2023-01-18 NOTE — PROCEDURE: TREATMENT REGIMEN
Plan: Physician Orders: Radiotherapy Treatment Plan: Superficial Radiotherapy with Ultrasound\\nPlan: This patient has been treated today with image-guided superficial radiation therapy for non-melanoma\\nskin cancer. Written informed consent has been previously obtained from this patient for this treatment. This\\nconsent is documented in the patient's chart. The patient gave verbal consent to continue treatment today.\\nThe patient was treated with a specific radiation dose and setup as prescribed by the provider listed on this\\nvisit note. A Radiation Therapist performed administration of radiation under the supervision of a provider.\\nThe treatment parameters and cumulative dose are indicated above. Prior to administering the radiation, the\\npatient underwent a verification therapeutic radiology simulation-aided field setting defining relevant normal\\nand abnormal target anatomy and acquiring images with a separate and distinct diagnostic high-frequency\\nultrasound to delineate tissues and determine whether to proceed with delivery of therapeutic, in addition to\\nretrieve data necessary to develop an optimal radiation treatment process for the patient. The field\\nplacement simulation documents any change seen in overall tumor volume documented in the patient’s\\nrecord, allows the clinician to indicate any needed changes in the treatment plan and/or prescription,\\nprovides diagnostic evaluation as the basis for performing the therapeutic procedure, and clearly identifies\\nthe information needed to decide to proceed with the therapeutic procedure. This process includes\\nverification of the treatment port(s) and proper treatment positioning. All treatment ports were\\nphotographed within electronic medical records. The patient's lead blocking along with gross tumor volume\\nand margin was confirmed. Considering superficial radiotherapy is clinical in setup, this requires the physician\\nand radiation therapist to clarify the location interest being treated against initial images, ultrasound,\\npathology, and patient anatomy. Care was taken to ensure pedersen treated were geometrically accurate and\\nproperly positioned using therapeutic radiology simulation-aided field setting verification per fraction. This\\nprocess is also utilized to determine if any prescription or setup changes are necessary. These steps are\\ntherefore medically necessary to ensure safe and effective administration of radiation. Ongoing therapeutic\\nradiology simulation-aided field setting verification is ordered throughout the course of therapy.\\nA high-frequency ultrasound image was acquired today for a two-dimensional evaluation of the tumor\\nvolume, depth, width, breadth, review of prior response to treatment, provide geometric accuracy of field\\nplacement, and determine whether to proceed with therapeutic delivery.\\nHigh frequency ultrasound depth is 1.81 mm, which is +0.18 mm in difference from previous imaging.\\nThe field placement and ultrasound imaging, per fraction, is separate and distinct from the initial simulation\\nand is an important task in providing safe administration of superficial radiation therapy. Physician evaluation\\nof the ultrasound information will be ongoing throughout the course of treatment and is deemed medically\\nnecessary to ensure the efficacy of treatment, whether to proceed with therapeutic delivery, and determine\\aviva necessary changes. Today's images were evaluated for determination of continuation of treatment with\\nthe current plan or with necessary changes as appropriate. According to the review of verification\\ntherapeutic radiology simulation-aided field setting and imaging, no change is required.\\nAdditionally, the use of ultrasound visualization and targeted assessment allows the patient to be able to see\\nher cancer progress, encouraging the patient to complete and maintain compliance through the full\\ncourse of prescribed radiotherapy. Per Dr. Martinez, continued ultrasound guidance and therapeutic radiology\\nsimulation-aided field setting verification per fraction is required for field placement, measurement of tumor\\ndepth, tissues evaluation, progress, acute effect monitoring, and determination for therapeutic treatment\\ndelivery is appropriate
Detail Level: Zone

## 2023-01-18 NOTE — PROCEDURE: SUPERFICIAL RADIATION TREATMENT
Bill For Simulation: Yes - (Simple Simulation: 41214) Bill For Simulation?: Yes - (Simple Simulation: 20279)

## 2023-01-19 ENCOUNTER — APPOINTMENT (OUTPATIENT)
Dept: URBAN - METROPOLITAN AREA CLINIC 268 | Age: 78
Setting detail: DERMATOLOGY
End: 2023-02-14

## 2023-01-19 PROBLEM — C44.311 BASAL CELL CARCINOMA OF SKIN OF NOSE: Status: ACTIVE | Noted: 2023-01-19

## 2023-01-19 PROCEDURE — 77401 RADIATION TX DELIVERY SUPFC: CPT

## 2023-01-19 PROCEDURE — 77280 THER RAD SIMULAJ FIELD SMPL: CPT

## 2023-01-19 PROCEDURE — OTHER SUPERFICIAL RADIATION TREATMENT: OTHER

## 2023-01-19 PROCEDURE — OTHER TREATMENT REGIMEN: OTHER

## 2023-01-19 PROCEDURE — G6001 ECHO GUIDANCE RADIOTHERAPY: HCPCS | Mod: XU

## 2023-01-19 PROCEDURE — 77427 RADIATION TX MANAGEMENT X5: CPT

## 2023-01-19 NOTE — PROCEDURE: SUPERFICIAL RADIATION TREATMENT
Bill For Simulation: Yes - (Simple Simulation: 39292) Bill For Simulation?: Yes - (Simple Simulation: 04760)

## 2023-01-19 NOTE — PROCEDURE: SUPERFICIAL RADIATION TREATMENT
Umbilical hernia repair with mesh Ultrasound Used Text: Patient has a location which was not amenable to ultrasound.

## 2023-01-19 NOTE — PROCEDURE: TREATMENT REGIMEN
Plan: Physician Orders: Radiotherapy Treatment Plan: Superficial Radiotherapy with Ultrasound\\nPlan: This patient has been treated today with image-guided superficial radiation therapy for non-melanoma\\nskin cancer. Written informed consent has been previously obtained from this patient for this treatment. This\\nconsent is documented in the patient's chart. The patient gave verbal consent to continue treatment today.\\nThe patient was treated with a specific radiation dose and setup as prescribed by the provider listed on this\\nvisit note. A Radiation Therapist performed administration of radiation under the supervision of a provider.\\nThe treatment parameters and cumulative dose are indicated above. Prior to administering the radiation, the\\npatient underwent a verification therapeutic radiology simulation-aided field setting defining relevant normal\\nand abnormal target anatomy and acquiring images with a separate and distinct diagnostic high-frequency\\nultrasound to delineate tissues and determine whether to proceed with delivery of therapeutic, in addition to\\nretrieve data necessary to develop an optimal radiation treatment process for the patient. The field\\nplacement simulation documents any change seen in overall tumor volume documented in the patient’s\\nrecord, allows the clinician to indicate any needed changes in the treatment plan and/or prescription,\\nprovides diagnostic evaluation as the basis for performing the therapeutic procedure, and clearly identifies\\nthe information needed to decide to proceed with the therapeutic procedure. This process includes\\nverification of the treatment port(s) and proper treatment positioning. All treatment ports were\\nphotographed within electronic medical records. The patient's lead blocking along with gross tumor volume\\nand margin was confirmed. Considering superficial radiotherapy is clinical in setup, this requires the physician\\nand radiation therapist to clarify the location interest being treated against initial images, ultrasound,\\npathology, and patient anatomy. Care was taken to ensure pedersen treated were geometrically accurate and\\nproperly positioned using therapeutic radiology simulation-aided field setting verification per fraction. This\\nprocess is also utilized to determine if any prescription or setup changes are necessary. These steps are\\ntherefore medically necessary to ensure safe and effective administration of radiation. Ongoing therapeutic\\nradiology simulation-aided field setting verification is ordered throughout the course of therapy.\\nA high-frequency ultrasound image was acquired today for a two-dimensional evaluation of the tumor\\nvolume, depth, width, breadth, review of prior response to treatment, provide geometric accuracy of field\\nplacement, and determine whether to proceed with therapeutic delivery.\\nHigh frequency ultrasound depth is 1.51 mm, which is -0.30 mm in difference from previous imaging.\\nThe field placement and ultrasound imaging, per fraction, is separate and distinct from the initial simulation\\nand is an important task in providing safe administration of superficial radiation therapy. Physician evaluation\\nof the ultrasound information will be ongoing throughout the course of treatment and is deemed medically\\nnecessary to ensure the efficacy of treatment, whether to proceed with therapeutic delivery, and determine\\aviva necessary changes. Today's images were evaluated for determination of continuation of treatment with\\nthe current plan or with necessary changes as appropriate. According to the review of verification\\ntherapeutic radiology simulation-aided field setting and imaging, no change is required.\\nAdditionally, the use of ultrasound visualization and targeted assessment allows the patient to be able to see\\nher cancer progress, encouraging the patient to complete and maintain compliance through the full\\ncourse of prescribed radiotherapy. Per Dr. Martinez, continued ultrasound guidance and therapeutic radiology\\nsimulation-aided field setting verification per fraction is required for field placement, measurement of tumor\\ndepth, tissues evaluation, progress, acute effect monitoring, and determination for therapeutic treatment\\ndelivery is appropriate\\n\\nPhysician Orders: Plan: On Treatment Visit (OTV) with Ultrasound\\nPlan: The patient is undergoing superficial radiation therapy for skin cancer and presents for weekly\\nevaluation and management. Per protocol and as documented on the flow sheet, the patient was questioned\\jaskaran to subjective redness, pruritus, pain, drainage, fatigue, or any other symptoms. Objectively, the radiation\\narea was evaluated with regards to erythema, atrophy, scale, crusting, erosion, ulceration, edema, purpura,\\ntenderness, warmth, drainage, and any other findings. The plan was extensively reviewed including dose and\\ndosing schedule. The simulation and clinical setup were also reviewed as were external and any internal\\nshields and based on this review the appropriateness and sufficiency of treatment was determined.\\nA high-frequency ultrasound image was acquired today for a two-dimensional evaluation of the tumor\\nvolume, depth, width, breadth, review of prior response to treatment, provide geometric accuracy of field\\nplacement, and determine whether to proceed with therapeutic delivery.\\nHigh frequency ultrasound depth is 1.51 mm, which is 0.30 mm in difference from previous imaging.\\nPer Dr. Martinez, continued ultrasound guidance and therapeutic radiology simulation-aided field setting\\nverification per fraction is required for field placement, measurement of tumor depth, tissues evaluation,\\nprogress, acute effect monitoring, and determination for therapeutic treatment delivery is appropriate.
Detail Level: Zone

## 2023-01-25 ENCOUNTER — APPOINTMENT (OUTPATIENT)
Dept: URBAN - METROPOLITAN AREA CLINIC 268 | Age: 78
Setting detail: DERMATOLOGY
End: 2023-01-30

## 2023-01-25 PROBLEM — C44.311 BASAL CELL CARCINOMA OF SKIN OF NOSE: Status: ACTIVE | Noted: 2023-01-25

## 2023-01-25 PROCEDURE — G6001 ECHO GUIDANCE RADIOTHERAPY: HCPCS | Mod: XU

## 2023-01-25 PROCEDURE — OTHER SUPERFICIAL RADIATION TREATMENT: OTHER

## 2023-01-25 PROCEDURE — 77280 THER RAD SIMULAJ FIELD SMPL: CPT

## 2023-01-25 PROCEDURE — 77401 RADIATION TX DELIVERY SUPFC: CPT

## 2023-01-25 PROCEDURE — OTHER TREATMENT REGIMEN: OTHER

## 2023-01-25 NOTE — PROCEDURE: SUPERFICIAL RADIATION TREATMENT
Received incoming refill request for  Pending Prescriptions:                       Disp   Refills    metFORMIN (GLUCOPHAGE-XR) 500 MG 24 hr ta*360 ta*0            Sig: Take 4 tablets by mouth daily (with dinner.)    glipiZIDE (GLUCOTROL XL) 10 MG 24 hr tabl*180 ta*0            Sig: Take 1 tablet by mouth 2 times daily.    Patient last had a refill of these medications on 2/23/21 for a 90 day supply. He patient was last seen with Doe on 5/13/21 where an a1c was done. Routing to Dr. Figueroa for approval or denial of refill request.     Treatment Margins In Cm: 0.8

## 2023-01-25 NOTE — PROCEDURE: TREATMENT REGIMEN
Plan: Physician Orders: Radiotherapy Treatment Plan: Superficial Radiotherapy with Ultrasound\\nPlan: This patient has been treated today with image-guided superficial radiation therapy for non-melanoma\\nskin cancer. Written informed consent has been previously obtained from this patient for this treatment. This\\nconsent is documented in the patient's chart. The patient gave verbal consent to continue treatment today.\\nThe patient was treated with a specific radiation dose and setup as prescribed by the provider listed on this\\nvisit note. A Radiation Therapist performed administration of radiation under the supervision of a provider.\\nThe treatment parameters and cumulative dose are indicated above. Prior to administering the radiation, the\\npatient underwent a verification therapeutic radiology simulation-aided field setting defining relevant normal\\nand abnormal target anatomy and acquiring images with a separate and distinct diagnostic high-frequency\\nultrasound to delineate tissues and determine whether to proceed with delivery of therapeutic, in addition to\\nretrieve data necessary to develop an optimal radiation treatment process for the patient. The field\\nplacement simulation documents any change seen in overall tumor volume documented in the patient’s\\nrecord, allows the clinician to indicate any needed changes in the treatment plan and/or prescription,\\nprovides diagnostic evaluation as the basis for performing the therapeutic procedure, and clearly identifies\\nthe information needed to decide to proceed with the therapeutic procedure. This process includes\\nverification of the treatment port(s) and proper treatment positioning. All treatment ports were\\nphotographed within electronic medical records. The patient's lead blocking along with gross tumor volume\\nand margin was confirmed. Considering superficial radiotherapy is clinical in setup, this requires the physician\\nand radiation therapist to clarify the location interest being treated against initial images, ultrasound,\\npathology, and patient anatomy. Care was taken to ensure pedersen treated were geometrically accurate and\\nproperly positioned using therapeutic radiology simulation-aided field setting verification per fraction. This\\nprocess is also utilized to determine if any prescription or setup changes are necessary. These steps are\\ntherefore medically necessary to ensure safe and effective administration of radiation. Ongoing therapeutic\\nradiology simulation-aided field setting verification is ordered throughout the course of therapy.\\nA high-frequency ultrasound image was acquired today for a two-dimensional evaluation of the tumor\\nvolume, depth, width, breadth, review of prior response to treatment, provide geometric accuracy of field\\nplacement, and determine whether to proceed with therapeutic delivery.\\nHigh frequency ultrasound depth is 1.91 mm, which is +0.40 mm in difference from previous imaging.\\nThe field placement and ultrasound imaging, per fraction, is separate and distinct from the initial simulation\\nand is an important task in providing safe administration of superficial radiation therapy. Physician evaluation\\nof the ultrasound information will be ongoing throughout the course of treatment and is deemed medically\\nnecessary to ensure the efficacy of treatment, whether to proceed with therapeutic delivery, and determine\\aviva necessary changes. Today's images were evaluated for determination of continuation of treatment with\\nthe current plan or with necessary changes as appropriate. According to the review of verification\\ntherapeutic radiology simulation-aided field setting and imaging, no change is required.\\nAdditionally, the use of ultrasound visualization and targeted assessment allows the patient to be able to see\\nher cancer progress, encouraging the patient to complete and maintain compliance through the full\\ncourse of prescribed radiotherapy. Per Dr. Martinez, continued ultrasound guidance and therapeutic radiology\\nsimulation-aided field setting verification per fraction is required for field placement, measurement of tumor\\ndepth, tissues evaluation, progress, acute effect monitoring, and determination for therapeutic treatment\\ndelivery is appropriate
Detail Level: Zone

## 2023-01-25 NOTE — PROCEDURE: SUPERFICIAL RADIATION TREATMENT
Bill For Simulation?: Yes - (Simple Simulation: 37068) Bill For Simulation: Yes - (Simple Simulation: 75984)

## 2023-01-26 ENCOUNTER — APPOINTMENT (OUTPATIENT)
Dept: URBAN - METROPOLITAN AREA CLINIC 268 | Age: 78
Setting detail: DERMATOLOGY
End: 2023-01-30

## 2023-01-26 PROBLEM — C44.311 BASAL CELL CARCINOMA OF SKIN OF NOSE: Status: ACTIVE | Noted: 2023-01-26

## 2023-01-26 PROCEDURE — 77401 RADIATION TX DELIVERY SUPFC: CPT

## 2023-01-26 PROCEDURE — 77280 THER RAD SIMULAJ FIELD SMPL: CPT

## 2023-01-26 PROCEDURE — OTHER TREATMENT REGIMEN: OTHER

## 2023-01-26 PROCEDURE — OTHER SUPERFICIAL RADIATION TREATMENT: OTHER

## 2023-01-26 PROCEDURE — G6001 ECHO GUIDANCE RADIOTHERAPY: HCPCS | Mod: XU

## 2023-01-26 NOTE — PROCEDURE: SUPERFICIAL RADIATION TREATMENT
Advance Directives: Care Instructions  Overview  An advance directive is a legal way to state your wishes at the end of your life. It tells your family and your doctor what to do if you can't say what youwant. There are two main types of advance directives. You can change them any timeyour wishes change. Living will. This form tells your family and your doctor your wishes about life support and other treatment. The form is also called a declaration. Medical power of . This form lets you name a person to make treatment decisions for you when you can't speak for yourself. This person is called a health care agent (health care proxy, health care surrogate). The form is also called a durable power of  for health care. If you do not have an advance directive, decisions about your medical care maybe made by a family member, or by a doctor or a  who doesn't know you. It may help to think of an advance directive as a gift to the people who carefor you. If you have one, they won't have to make tough decisions by themselves. Follow-up care is a key part of your treatment and safety. Be sure to make and go to all appointments, and call your doctor if you are having problems. It's also a good idea to know your test results and keep alist of the medicines you take. What should you include in an advance directive? Many states have a unique advance directive form. (It may ask you to address specific issues.) Or you might use a universal form that's approved by manystates. If your form doesn't tell you what to address, it may be hard to know what to include in your advance directive. Use the questions below to help you getstarted.  Who do you want to make decisions about your medical care if you are not able to?  What life-support measures do you want if you have a serious illness that gets worse over time or can't be cured?  What are you most afraid of that might happen?  (Maybe you're afraid of having pain, losing your independence, or being kept alive by machines.)   Where would you prefer to die? (Your home? A hospital? A nursing home?)   Do you want to donate your organs when you die?  Do you want certain Buddhism practices performed before you die? When should you call for help? Be sure to contact your doctor if you have any questions. Where can you learn more? Go to [email or web address] and sign in to your [email or web address] account. Enter R264 in the Parachute box to learn more about \"Advance Directives: Care Instructions. \"     If you do not have an account, please click on the \"Sign Up Now\" link. Current as of: October 18, 2021               Content Version: 13.2  © 2006-2022 [employer]. Care instructions adapted under license by [facility] ([facility]). If you have questions about a medical condition or this instruction, always ask your healthcare professional. [place] [address] any warranty or liability for your use of this information. Learning About Medical Power of   What is a medical power of ? A medical power of , also called a durable power of  for health care, is one type of the legal forms called advance directives. It lets you name the person you want to make treatment decisions for you if you can't speak or decide for yourself. The person you choose is called your health care agent. This person is also called a health care proxy or health care surrogate. A medical power of  may be called something else in your state. How do you choose a health care agent? Choose your health care agent carefully. This person may or may not be a familymember. Talk to the person before you make your final decision. Make sure he or she iscomfortable with this responsibility. It's a good idea to choose someone who:   Is at least 25years old.    Knows you well and understands what makes life meaningful for you.  Understands your Buddhist and moral values.  Will do what you want, not what he or she wants.  Will be able to make difficult choices at a stressful time.  Will be able to refuse or stop treatment, if that is what you would want, even if you could die.  Will be firm and confident with health professionals if needed.  Will ask questions to get needed information.  Lives near you or agrees to travel to you if needed. Your family may help you make medical decisions while you can still be part of that process. But it's important to choose one person to be your health careagent in case you aren't able to make decisions for yourself. If you don't fill out the legal form and name a health care agent, thedecisions your family can make may be limited. A health care agent may be called something else in your state. Who will make decisions for you if you don't have a health care agent? If you don't have a health care agent or a living will, you may not get the care you want. Decisions may be made by family members who disagree about your medical care. Or decisions may be made by a medical professional who doesn'tknow you well. In some cases, a  makes the decisions. When you name a health care agent, it is very clear who has the power to [place] decisions for you. How do you name a health care agent? You name your health care agent on a legal form. This form is usually called a medical power of . Ask your hospital, state bar association, or officeon aging where to find these forms. You must sign the form to make it legal. Some states require you to get the form notarized. This means that a person called a  watches you sign the form and then he or she signs the form. Some states also require thattwo or more witnesses sign the form. Be sure to tell your family members and doctors who your health care agent is. Where can you learn more?   Go to [email or web address] and sign in to your IPNetVoice account. Enter [id number] in the [facility] box to learn more about \"Learning About [address]. \"     If you do not have an account, please click on the \"Sign Up Now\" link. Current as of: October 18, 2021               Content Version: 13.2  © 7793-4894 Healthwise, [employer]. Care instructions adapted under license by [facility] ([facility]). If you have questions about a medical condition or this instruction, always ask your healthcare professional. [address] any warranty or liability for your use of this information. Learning About Living William Angulo  What is a living will? A living will, also called a declaration, is a legal form. It tells your family and your doctor your wishes when you can't speak for yourself. It's used by the health professionals who will treat you as you near the end of your life or ifyou get seriously hurt or ill. If you put your wishes in writing, your loved ones and others will know what kind of care you want. They won't need to guess. This can ease your mind and behelpful to others. And you can change or cancel your living will at any time. A living will is not the same as an estate or property will. An estate willexplains what you want to happen with your money and property after you die. How do you use it? Keep these facts in mind about how a living will is used.  Your living will is used only if you can't speak or make decisions for yourself. Most often, one or more doctors must certify that you can't speak or decide for yourself before your living will takes effect.  If you get better and can speak for yourself again, you can accept or refuse any treatment. It doesn't matter what you said in your living will.  Some states may limit your right to refuse treatment in certain cases.  For example, you may need to clearly state in your living will that you don't want artificial hydration and nutrition, such as being fed through a tube. Is a living will a legal document? A living will is a legal document. Each state has its own laws about livingwills. And a living will may be called something else in your state. Here are some things to know about living orona.  You don't need an  to complete a living will. But legal advice can be helpful if your state's laws are unclear. It can also help if your health history is complicated or your family can't agree on what should be in your living will.  You can change your living will at any time. Some people find that their wishes about end-of-life care change as their health changes. If you make big changes to your living will, complete a new form.  If you move to another state, make sure that your living will is legal in the state where you now live. In most cases, doctors will respect your wishes even if you have a form from a different state.  You might use a universal form that has been approved by many states. This kind of form can sometimes be filled out and stored online. Your digital copy will then be available wherever you have a connection to the internet. The doctors and nurses who need to treat you can find it right away.  Your state may offer an online registry. This is another place where you can store your living will online.  It's a good idea to get your living will notarized. This means using a person called a  to watch two people sign, or witness, your living will. What should you know when you create a living will? Here are some questions to ask yourself as you make your living will.  Do you know enough about life support methods that might be used? If not, talk to your doctor so you know what might be done if you can't breathe on your own, your heart stops, or you can't swallow.  What things would you still want to be able to do after you receive life-support methods?  Would Treatment Margins In Cm: 0.8 you want to be able to walk? To speak? To eat on your own? To live without the help of machines?  Do you want certain Caodaism practices performed if you become very ill?  If you have a choice, where do you want to be cared for? In your home? At a hospital or nursing home?  If you have a choice at the end of your life, where would you prefer to die? At home? In a hospital or nursing home? Somewhere else?  Would you prefer to be buried or cremated?  Do you want your organs to be donated after you die? What should you do with your living will?  Make sure that your family members and your health care agent have copies of your living will (also called a declaration).  Give your doctor a copy of your living will. Ask to have it kept as part of your medical record. If you have more than one doctor, make sure that each one has a copy.  Put a copy of your living will where it can be easily found. For example, some people may put a copy on their refrigerator door. If you are using a digital copy, be sure your doctor, family members, and health care agent know how to find and access it. Where can you learn more? Go to [email or web address] and sign in to your Accelalox account. Enter L463 in the [employer] Elements box to learn more about \"Learning About Living Juan Arndt. \"     If you do not have an account, please click on the \"Sign Up Now\" link. Current as of: October 18, 2021               Content Version: 13.2  © 2006-2022 Healthwise, Incorporated. Care instructions adapted under license by [facility] ([facility]). If you have questions about a medical condition or this instruction, always ask your healthcare professional. Laci Erickson any warranty or liability for your use of this information. Personalized Preventive Plan for [facility] - 6/8/2022  Medicare offers a range of preventive health benefits.  Some of the tests and screenings are paid in full while other may be subject to a deductible, co-insurance, and/or copay. Some of these benefits include a comprehensive review of your medical history including lifestyle, illnesses that may run in your family, and various assessments and screenings as appropriate. After reviewing your medical record and screening and assessments performed today your provider may have ordered immunizations, labs, imaging, and/or referrals for you. A list of these orders (if applicable) as well as your Preventive Care list are included within your After Visit Summary for your review. Other Preventive Recommendations:    · A preventive eye exam performed by an eye specialist is recommended every 1-2 years to screen for glaucoma; cataracts, macular degeneration, and other eye disorders. · A preventive dental visit is recommended every 6 months. · Try to get at least 150 minutes of exercise per week or 10,000 steps per day on a pedometer . · Order or download the FREE \"Exercise & Physical Activity: Your Everyday Guide\" from [employer]. Call 7-353.261.4478 or search [employer] online. · You need 5205-8102 mg of calcium and 1533-6867 IU of vitamin D per day. It is possible to meet your calcium requirement with diet alone, but a vitamin D supplement is usually necessary to meet this goal.  · When exposed to the sun, use a sunscreen that protects against both UVA and UVB radiation with an SPF of 30 or greater. Reapply every 2 to 3 hours or after sweating, drying off with a towel, or swimming. · Always wear a seat belt when traveling in a car. Always wear a helmet when riding a bicycle or motorcycle.

## 2023-01-26 NOTE — PROCEDURE: SUPERFICIAL RADIATION TREATMENT
Bill For Simulation: Yes - (Simple Simulation: 62143) Bill For Simulation?: Yes - (Simple Simulation: 69569)

## 2023-01-26 NOTE — PROCEDURE: TREATMENT REGIMEN
Detail Level: Zone
Plan: Physician Orders: Radiotherapy Treatment Plan: Superficial Radiotherapy with Ultrasound\\nPlan: This patient has been treated today with image-guided superficial radiation therapy for non-melanoma\\nskin cancer. Written informed consent has been previously obtained from this patient for this treatment. This\\nconsent is documented in the patient's chart. The patient gave verbal consent to continue treatment today.\\nThe patient was treated with a specific radiation dose and setup as prescribed by the provider listed on this\\nvisit note. A Radiation Therapist performed administration of radiation under the supervision of a provider.\\nThe treatment parameters and cumulative dose are indicated above. Prior to administering the radiation, the\\npatient underwent a verification therapeutic radiology simulation-aided field setting defining relevant normal\\nand abnormal target anatomy and acquiring images with a separate and distinct diagnostic high-frequency\\nultrasound to delineate tissues and determine whether to proceed with delivery of therapeutic, in addition to\\nretrieve data necessary to develop an optimal radiation treatment process for the patient. The field\\nplacement simulation documents any change seen in overall tumor volume documented in the patient’s\\nrecord, allows the clinician to indicate any needed changes in the treatment plan and/or prescription,\\nprovides diagnostic evaluation as the basis for performing the therapeutic procedure, and clearly identifies\\nthe information needed to decide to proceed with the therapeutic procedure. This process includes\\nverification of the treatment port(s) and proper treatment positioning. All treatment ports were\\nphotographed within electronic medical records. The patient's lead blocking along with gross tumor volume\\nand margin was confirmed. Considering superficial radiotherapy is clinical in setup, this requires the physician\\nand radiation therapist to clarify the location interest being treated against initial images, ultrasound,\\npathology, and patient anatomy. Care was taken to ensure pedersen treated were geometrically accurate and\\nproperly positioned using therapeutic radiology simulation-aided field setting verification per fraction. This\\nprocess is also utilized to determine if any prescription or setup changes are necessary. These steps are\\ntherefore medically necessary to ensure safe and effective administration of radiation. Ongoing therapeutic\\nradiology simulation-aided field setting verification is ordered throughout the course of therapy.\\nA high-frequency ultrasound image was acquired today for a two-dimensional evaluation of the tumor\\nvolume, depth, width, breadth, review of prior response to treatment, provide geometric accuracy of field\\nplacement, and determine whether to proceed with therapeutic delivery.\\nHigh frequency ultrasound depth is 1.58 mm, which is -0.38 mm in difference from previous imaging.\\nThe field placement and ultrasound imaging, per fraction, is separate and distinct from the initial simulation\\nand is an important task in providing safe administration of superficial radiation therapy. Physician evaluation\\nof the ultrasound information will be ongoing throughout the course of treatment and is deemed medically\\nnecessary to ensure the efficacy of treatment, whether to proceed with therapeutic delivery, and determine\\aviva necessary changes. Today's images were evaluated for determination of continuation of treatment with\\nthe current plan or with necessary changes as appropriate. According to the review of verification\\ntherapeutic radiology simulation-aided field setting and imaging, no change is required.\\nAdditionally, the use of ultrasound visualization and targeted assessment allows the patient to be able to see\\nher cancer progress, encouraging the patient to complete and maintain compliance through the full\\ncourse of prescribed radiotherapy. Per Dr. Martinez, continued ultrasound guidance and therapeutic radiology\\nsimulation-aided field setting verification per fraction is required for field placement, measurement of tumor\\ndepth, tissues evaluation, progress, acute effect monitoring, and determination for therapeutic treatment\\ndelivery is appropriate

## 2023-01-28 ENCOUNTER — APPOINTMENT (OUTPATIENT)
Dept: URBAN - METROPOLITAN AREA CLINIC 268 | Age: 78
Setting detail: DERMATOLOGY
End: 2023-03-14

## 2023-01-28 PROCEDURE — 77336 RADIATION PHYSICS CONSULT: CPT

## 2023-01-30 ENCOUNTER — APPOINTMENT (OUTPATIENT)
Dept: URBAN - METROPOLITAN AREA CLINIC 268 | Age: 78
Setting detail: DERMATOLOGY
End: 2023-02-08

## 2023-01-30 PROBLEM — C44.311 BASAL CELL CARCINOMA OF SKIN OF NOSE: Status: ACTIVE | Noted: 2023-01-30

## 2023-01-30 PROCEDURE — OTHER TREATMENT REGIMEN: OTHER

## 2023-01-30 PROCEDURE — OTHER SUPERFICIAL RADIATION TREATMENT: OTHER

## 2023-01-30 PROCEDURE — G6001 ECHO GUIDANCE RADIOTHERAPY: HCPCS | Mod: XU

## 2023-01-30 PROCEDURE — 77280 THER RAD SIMULAJ FIELD SMPL: CPT

## 2023-01-30 PROCEDURE — 77401 RADIATION TX DELIVERY SUPFC: CPT

## 2023-01-30 NOTE — PROCEDURE: TREATMENT REGIMEN
Detail Level: Zone
Plan: Physician Orders: Radiotherapy Treatment Plan: Superficial Radiotherapy with Ultrasound\\nPlan: This patient has been treated today with image-guided superficial radiation therapy for non-melanoma\\nskin cancer. Written informed consent has been previously obtained from this patient for this treatment. This\\nconsent is documented in the patient's chart. The patient gave verbal consent to continue treatment today.\\nThe patient was treated with a specific radiation dose and setup as prescribed by the provider listed on this\\nvisit note. A Radiation Therapist performed administration of radiation under the supervision of a provider.\\nThe treatment parameters and cumulative dose are indicated above. Prior to administering the radiation, the\\npatient underwent a verification therapeutic radiology simulation-aided field setting defining relevant normal\\nand abnormal target anatomy and acquiring images with a separate and distinct diagnostic high-frequency\\nultrasound to delineate tissues and determine whether to proceed with delivery of therapeutic, in addition to\\nretrieve data necessary to develop an optimal radiation treatment process for the patient. The field\\nplacement simulation documents any change seen in overall tumor volume documented in the patient’s\\nrecord, allows the clinician to indicate any needed changes in the treatment plan and/or prescription,\\nprovides diagnostic evaluation as the basis for performing the therapeutic procedure, and clearly identifies\\nthe information needed to decide to proceed with the therapeutic procedure. This process includes\\nverification of the treatment port(s) and proper treatment positioning. All treatment ports were\\nphotographed within electronic medical records. The patient's lead blocking along with gross tumor volume\\nand margin was confirmed. Considering superficial radiotherapy is clinical in setup, this requires the physician\\nand radiation therapist to clarify the location interest being treated against initial images, ultrasound,\\npathology, and patient anatomy. Care was taken to ensure pedersen treated were geometrically accurate and\\nproperly positioned using therapeutic radiology simulation-aided field setting verification per fraction. This\\nprocess is also utilized to determine if any prescription or setup changes are necessary. These steps are\\ntherefore medically necessary to ensure safe and effective administration of radiation. Ongoing therapeutic\\nradiology simulation-aided field setting verification is ordered throughout the course of therapy.\\nA high-frequency ultrasound image was acquired today for a two-dimensional evaluation of the tumor\\nvolume, depth, width, breadth, review of prior response to treatment, provide geometric accuracy of field\\nplacement, and determine whether to proceed with therapeutic delivery.\\nHigh frequency ultrasound depth is 1.56 mm, which is -0.02 mm in difference from previous imaging.\\nThe field placement and ultrasound imaging, per fraction, is separate and distinct from the initial simulation\\nand is an important task in providing safe administration of superficial radiation therapy. Physician evaluation\\nof the ultrasound information will be ongoing throughout the course of treatment and is deemed medically\\nnecessary to ensure the efficacy of treatment, whether to proceed with therapeutic delivery, and determine\\aviva necessary changes. Today's images were evaluated for determination of continuation of treatment with\\nthe current plan or with necessary changes as appropriate. According to the review of verification\\ntherapeutic radiology simulation-aided field setting and imaging, no change is required.\\nAdditionally, the use of ultrasound visualization and targeted assessment allows the patient to be able to see\\nher cancer progress, encouraging the patient to complete and maintain compliance through the full\\ncourse of prescribed radiotherapy. Per Dr. Martinez, continued ultrasound guidance and therapeutic radiology\\nsimulation-aided field setting verification per fraction is required for field placement, measurement of tumor\\ndepth, tissues evaluation, progress, acute effect monitoring, and determination for therapeutic treatment\\ndelivery is appropriate

## 2023-01-30 NOTE — PROCEDURE: SUPERFICIAL RADIATION TREATMENT
Bill For Simulation: Yes - (Simple Simulation: 51627) Bill For Simulation?: Yes - (Simple Simulation: 74489)

## 2023-01-31 ENCOUNTER — APPOINTMENT (OUTPATIENT)
Dept: URBAN - METROPOLITAN AREA CLINIC 268 | Age: 78
Setting detail: DERMATOLOGY
End: 2023-02-10

## 2023-01-31 PROBLEM — C44.311 BASAL CELL CARCINOMA OF SKIN OF NOSE: Status: ACTIVE | Noted: 2023-01-31

## 2023-01-31 PROCEDURE — G6001 ECHO GUIDANCE RADIOTHERAPY: HCPCS | Mod: XU

## 2023-01-31 PROCEDURE — 77280 THER RAD SIMULAJ FIELD SMPL: CPT

## 2023-01-31 PROCEDURE — OTHER TREATMENT REGIMEN: OTHER

## 2023-01-31 PROCEDURE — OTHER SUPERFICIAL RADIATION TREATMENT: OTHER

## 2023-01-31 PROCEDURE — 77401 RADIATION TX DELIVERY SUPFC: CPT

## 2023-01-31 NOTE — PROCEDURE: TREATMENT REGIMEN
Detail Level: Zone
Plan: Physician Orders: Radiotherapy Treatment Plan: Superficial Radiotherapy with Ultrasound\\nPlan: This patient has been treated today with image-guided superficial radiation therapy for non-melanoma\\nskin cancer. Written informed consent has been previously obtained from this patient for this treatment. This\\nconsent is documented in the patient's chart. The patient gave verbal consent to continue treatment today.\\nThe patient was treated with a specific radiation dose and setup as prescribed by the provider listed on this\\nvisit note. A Radiation Therapist performed administration of radiation under the supervision of a provider.\\nThe treatment parameters and cumulative dose are indicated above. Prior to administering the radiation, the\\npatient underwent a verification therapeutic radiology simulation-aided field setting defining relevant normal\\nand abnormal target anatomy and acquiring images with a separate and distinct diagnostic high-frequency\\nultrasound to delineate tissues and determine whether to proceed with delivery of therapeutic, in addition to\\nretrieve data necessary to develop an optimal radiation treatment process for the patient. The field\\nplacement simulation documents any change seen in overall tumor volume documented in the patient’s\\nrecord, allows the clinician to indicate any needed changes in the treatment plan and/or prescription,\\nprovides diagnostic evaluation as the basis for performing the therapeutic procedure, and clearly identifies\\nthe information needed to decide to proceed with the therapeutic procedure. This process includes\\nverification of the treatment port(s) and proper treatment positioning. All treatment ports were\\nphotographed within electronic medical records. The patient's lead blocking along with gross tumor volume\\nand margin was confirmed. Considering superficial radiotherapy is clinical in setup, this requires the physician\\nand radiation therapist to clarify the location interest being treated against initial images, ultrasound,\\npathology, and patient anatomy. Care was taken to ensure pedersen treated were geometrically accurate and\\nproperly positioned using therapeutic radiology simulation-aided field setting verification per fraction. This\\nprocess is also utilized to determine if any prescription or setup changes are necessary. These steps are\\ntherefore medically necessary to ensure safe and effective administration of radiation. Ongoing therapeutic\\nradiology simulation-aided field setting verification is ordered throughout the course of therapy.\\nA high-frequency ultrasound image was acquired today for a two-dimensional evaluation of the tumor\\nvolume, depth, width, breadth, review of prior response to treatment, provide geometric accuracy of field\\nplacement, and determine whether to proceed with therapeutic delivery.\\nHigh frequency ultrasound depth is 1.62 mm, which is -0.06 mm in difference from previous imaging.\\nThe field placement and ultrasound imaging, per fraction, is separate and distinct from the initial simulation\\nand is an important task in providing safe administration of superficial radiation therapy. Physician evaluation\\nof the ultrasound information will be ongoing throughout the course of treatment and is deemed medically\\nnecessary to ensure the efficacy of treatment, whether to proceed with therapeutic delivery, and determine\\aviva necessary changes. Today's images were evaluated for determination of continuation of treatment with\\nthe current plan or with necessary changes as appropriate. According to the review of verification\\ntherapeutic radiology simulation-aided field setting and imaging, no change is required.\\nAdditionally, the use of ultrasound visualization and targeted assessment allows the patient to be able to see\\nher cancer progress, encouraging the patient to complete and maintain compliance through the full\\ncourse of prescribed radiotherapy. Per Dr. Martinez, continued ultrasound guidance and therapeutic radiology\\nsimulation-aided field setting verification per fraction is required for field placement, measurement of tumor\\ndepth, tissues evaluation, progress, acute effect monitoring, and determination for therapeutic treatment\\ndelivery is appropriate

## 2023-01-31 NOTE — PROCEDURE: SUPERFICIAL RADIATION TREATMENT
Bill For Simulation: Yes - (Simple Simulation: 41233) Bill For Simulation?: Yes - (Simple Simulation: 50152)

## 2023-02-07 ENCOUNTER — APPOINTMENT (OUTPATIENT)
Dept: URBAN - METROPOLITAN AREA CLINIC 268 | Age: 78
Setting detail: DERMATOLOGY
End: 2023-02-10

## 2023-02-07 PROBLEM — C44.311 BASAL CELL CARCINOMA OF SKIN OF NOSE: Status: ACTIVE | Noted: 2023-02-07

## 2023-02-07 PROCEDURE — OTHER TREATMENT REGIMEN: OTHER

## 2023-02-07 PROCEDURE — 77401 RADIATION TX DELIVERY SUPFC: CPT

## 2023-02-07 PROCEDURE — OTHER SUPERFICIAL RADIATION TREATMENT: OTHER

## 2023-02-07 PROCEDURE — 77427 RADIATION TX MANAGEMENT X5: CPT

## 2023-02-07 PROCEDURE — 77280 THER RAD SIMULAJ FIELD SMPL: CPT

## 2023-02-07 PROCEDURE — G6001 ECHO GUIDANCE RADIOTHERAPY: HCPCS | Mod: XU

## 2023-02-07 NOTE — PROCEDURE: SUPERFICIAL RADIATION TREATMENT
Bill For Simulation: Yes - (Simple Simulation: 05230) Bill For Simulation?: Yes - (Simple Simulation: 11182)

## 2023-02-07 NOTE — PROCEDURE: TREATMENT REGIMEN
Plan: Physician Orders: Radiotherapy Treatment Plan: Superficial Radiotherapy with Ultrasound\\nPlan: This patient has been treated today with image-guided superficial radiation therapy for non-melanoma\\nskin cancer. Written informed consent has been previously obtained from this patient for this treatment. This\\nconsent is documented in the patient's chart. The patient gave verbal consent to continue treatment today.\\nThe patient was treated with a specific radiation dose and setup as prescribed by the provider listed on this\\nvisit note. A Radiation Therapist performed administration of radiation under the supervision of a provider.\\nThe treatment parameters and cumulative dose are indicated above. Prior to administering the radiation, the\\npatient underwent a verification therapeutic radiology simulation-aided field setting defining relevant normal\\nand abnormal target anatomy and acquiring images with a separate and distinct diagnostic high-frequency\\nultrasound to delineate tissues and determine whether to proceed with delivery of therapeutic, in addition to\\nretrieve data necessary to develop an optimal radiation treatment process for the patient. The field\\nplacement simulation documents any change seen in overall tumor volume documented in the patient’s\\nrecord, allows the clinician to indicate any needed changes in the treatment plan and/or prescription,\\nprovides diagnostic evaluation as the basis for performing the therapeutic procedure, and clearly identifies\\nthe information needed to decide to proceed with the therapeutic procedure. This process includes\\nverification of the treatment port(s) and proper treatment positioning. All treatment ports were\\nphotographed within electronic medical records. The patient's lead blocking along with gross tumor volume\\nand margin was confirmed. Considering superficial radiotherapy is clinical in setup, this requires the physician\\nand radiation therapist to clarify the location interest being treated against initial images, ultrasound,\\npathology, and patient anatomy. Care was taken to ensure pedersen treated were geometrically accurate and\\nproperly positioned using therapeutic radiology simulation-aided field setting verification per fraction. This\\nprocess is also utilized to determine if any prescription or setup changes are necessary. These steps are\\ntherefore medically necessary to ensure safe and effective administration of radiation. Ongoing therapeutic\\nradiology simulation-aided field setting verification is ordered throughout the course of therapy.\\nA high-frequency ultrasound image was acquired today for a two-dimensional evaluation of the tumor\\nvolume, depth, width, breadth, review of prior response to treatment, provide geometric accuracy of field\\nplacement, and determine whether to proceed with therapeutic delivery.\\nHigh frequency ultrasound depth is 1.62 mm, which is -0.06 mm in difference from previous imaging.\\nThe field placement and ultrasound imaging, per fraction, is separate and distinct from the initial simulation\\nand is an important task in providing safe administration of superficial radiation therapy. Physician evaluation\\nof the ultrasound information will be ongoing throughout the course of treatment and is deemed medically\\nnecessary to ensure the efficacy of treatment, whether to proceed with therapeutic delivery, and determine\\aviva necessary changes. Today's images were evaluated for determination of continuation of treatment with\\nthe current plan or with necessary changes as appropriate. According to the review of verification\\ntherapeutic radiology simulation-aided field setting and imaging, no change is required.\\nAdditionally, the use of ultrasound visualization and targeted assessment allows the patient to be able to see\\nher cancer progress, encouraging the patient to complete and maintain compliance through the full\\ncourse of prescribed radiotherapy. Per Dr. Martinez, continued ultrasound guidance and therapeutic radiology\\nsimulation-aided field setting verification per fraction is required for field placement, measurement of tumor\\ndepth, tissues evaluation, progress, acute effect monitoring, and determination for therapeutic treatment\\ndelivery is appropriate\\n\\nPhysician Orders: Plan: On Treatment Visit (OTV) with Ultrasound\\nPlan: The patient is undergoing superficial radiation therapy for skin cancer and presents for weekly\\nevaluation and management. Per protocol and as documented on the flow sheet, the patient was questioned\\jaskaran to subjective redness, pruritus, pain, drainage, fatigue, or any other symptoms. Objectively, the radiation\\narea was evaluated with regards to erythema, atrophy, scale, crusting, erosion, ulceration, edema, purpura,\\ntenderness, warmth, drainage, and any other findings. The plan was extensively reviewed including dose and\\ndosing schedule. The simulation and clinical setup were also reviewed as were external and any internal\\nshields and based on this review the appropriateness and sufficiency of treatment was determined.\\nA high-frequency ultrasound image was acquired today for a two-dimensional evaluation of the tumor\\nvolume, depth, width, breadth, review of prior response to treatment, provide geometric accuracy of field\\nplacement, and determine whether to proceed with therapeutic delivery.\\nHigh frequency ultrasound depth is 1.37 mm, which is 0.25 mm in difference from previous imaging.\\nPer Dr. Martinez, continued ultrasound guidance and therapeutic radiology simulation-aided field setting\\nverification per fraction is required for field placement, measurement of tumor depth, tissues evaluation,\\nprogress, acute effect monitoring, and determination for therapeutic treatment delivery is appropriate
Detail Level: Zone

## 2023-02-08 ENCOUNTER — APPOINTMENT (OUTPATIENT)
Dept: URBAN - METROPOLITAN AREA CLINIC 268 | Age: 78
Setting detail: DERMATOLOGY
End: 2023-02-10

## 2023-02-08 PROBLEM — C44.311 BASAL CELL CARCINOMA OF SKIN OF NOSE: Status: ACTIVE | Noted: 2023-02-08

## 2023-02-08 PROCEDURE — 77401 RADIATION TX DELIVERY SUPFC: CPT

## 2023-02-08 PROCEDURE — 77280 THER RAD SIMULAJ FIELD SMPL: CPT

## 2023-02-08 PROCEDURE — OTHER SUPERFICIAL RADIATION TREATMENT: OTHER

## 2023-02-08 PROCEDURE — OTHER TREATMENT REGIMEN: OTHER

## 2023-02-08 PROCEDURE — G6001 ECHO GUIDANCE RADIOTHERAPY: HCPCS | Mod: XU

## 2023-02-08 NOTE — PROCEDURE: TREATMENT REGIMEN
Detail Level: Zone
Plan: Physician Orders: Radiotherapy Treatment Plan: Superficial Radiotherapy with Ultrasound\\nPlan: This patient has been treated today with image-guided superficial radiation therapy for non-melanoma\\nskin cancer. Written informed consent has been previously obtained from this patient for this treatment. This\\nconsent is documented in the patient's chart. The patient gave verbal consent to continue treatment today.\\nThe patient was treated with a specific radiation dose and setup as prescribed by the provider listed on this\\nvisit note. A Radiation Therapist performed administration of radiation under the supervision of a provider.\\nThe treatment parameters and cumulative dose are indicated above. Prior to administering the radiation, the\\npatient underwent a verification therapeutic radiology simulation-aided field setting defining relevant normal\\nand abnormal target anatomy and acquiring images with a separate and distinct diagnostic high-frequency\\nultrasound to delineate tissues and determine whether to proceed with delivery of therapeutic, in addition to\\nretrieve data necessary to develop an optimal radiation treatment process for the patient. The field\\nplacement simulation documents any change seen in overall tumor volume documented in the patient’s\\nrecord, allows the clinician to indicate any needed changes in the treatment plan and/or prescription,\\nprovides diagnostic evaluation as the basis for performing the therapeutic procedure, and clearly identifies\\nthe information needed to decide to proceed with the therapeutic procedure. This process includes\\nverification of the treatment port(s) and proper treatment positioning. All treatment ports were\\nphotographed within electronic medical records. The patient's lead blocking along with gross tumor volume\\nand margin was confirmed. Considering superficial radiotherapy is clinical in setup, this requires the physician\\nand radiation therapist to clarify the location interest being treated against initial images, ultrasound,\\npathology, and patient anatomy. Care was taken to ensure pedersen treated were geometrically accurate and\\nproperly positioned using therapeutic radiology simulation-aided field setting verification per fraction. This\\nprocess is also utilized to determine if any prescription or setup changes are necessary. These steps are\\ntherefore medically necessary to ensure safe and effective administration of radiation. Ongoing therapeutic\\nradiology simulation-aided field setting verification is ordered throughout the course of therapy.\\nA high-frequency ultrasound image was acquired today for a two-dimensional evaluation of the tumor\\nvolume, depth, width, breadth, review of prior response to treatment, provide geometric accuracy of field\\nplacement, and determine whether to proceed with therapeutic delivery.\\nHigh frequency ultrasound depth is 1.45 mm, which is +0.08 mm in difference from previous imaging.\\nThe field placement and ultrasound imaging, per fraction, is separate and distinct from the initial simulation\\nand is an important task in providing safe administration of superficial radiation therapy. Physician evaluation\\nof the ultrasound information will be ongoing throughout the course of treatment and is deemed medically\\nnecessary to ensure the efficacy of treatment, whether to proceed with therapeutic delivery, and determine\\aviva necessary changes. Today's images were evaluated for determination of continuation of treatment with\\nthe current plan or with necessary changes as appropriate. According to the review of verification\\ntherapeutic radiology simulation-aided field setting and imaging, no change is required.\\nAdditionally, the use of ultrasound visualization and targeted assessment allows the patient to be able to see\\nher cancer progress, encouraging the patient to complete and maintain compliance through the full\\ncourse of prescribed radiotherapy. Per Dr. Martinez, continued ultrasound guidance and therapeutic radiology\\nsimulation-aided field setting verification per fraction is required for field placement, measurement of tumor\\ndepth, tissues evaluation, progress, acute effect monitoring, and determination for therapeutic treatment\\ndelivery is appropriate

## 2023-02-08 NOTE — PROCEDURE: SUPERFICIAL RADIATION TREATMENT
Bill For Simulation: Yes - (Simple Simulation: 96799) Bill For Simulation?: Yes - (Simple Simulation: 72278)

## 2023-02-09 ENCOUNTER — APPOINTMENT (OUTPATIENT)
Dept: URBAN - METROPOLITAN AREA CLINIC 268 | Age: 78
Setting detail: DERMATOLOGY
End: 2023-02-10

## 2023-02-09 PROBLEM — C44.311 BASAL CELL CARCINOMA OF SKIN OF NOSE: Status: ACTIVE | Noted: 2023-02-09

## 2023-02-09 PROCEDURE — G6001 ECHO GUIDANCE RADIOTHERAPY: HCPCS | Mod: XU

## 2023-02-09 PROCEDURE — 77280 THER RAD SIMULAJ FIELD SMPL: CPT

## 2023-02-09 PROCEDURE — 77401 RADIATION TX DELIVERY SUPFC: CPT

## 2023-02-09 PROCEDURE — OTHER TREATMENT REGIMEN: OTHER

## 2023-02-09 PROCEDURE — OTHER SUPERFICIAL RADIATION TREATMENT: OTHER

## 2023-02-09 NOTE — PROCEDURE: SUPERFICIAL RADIATION TREATMENT
Bill For Simulation?: Yes - (Simple Simulation: 57409) Bill For Simulation: Yes - (Simple Simulation: 05961)

## 2023-02-09 NOTE — PROCEDURE: TREATMENT REGIMEN
Detail Level: Zone
Plan: Physician Orders: Radiotherapy Treatment Plan: Superficial Radiotherapy with Ultrasound\\nPlan: This patient has been treated today with image-guided superficial radiation therapy for non-melanoma\\nskin cancer. Written informed consent has been previously obtained from this patient for this treatment. This\\nconsent is documented in the patient's chart. The patient gave verbal consent to continue treatment today.\\nThe patient was treated with a specific radiation dose and setup as prescribed by the provider listed on this\\nvisit note. A Radiation Therapist performed administration of radiation under the supervision of a provider.\\nThe treatment parameters and cumulative dose are indicated above. Prior to administering the radiation, the\\npatient underwent a verification therapeutic radiology simulation-aided field setting defining relevant normal\\nand abnormal target anatomy and acquiring images with a separate and distinct diagnostic high-frequency\\nultrasound to delineate tissues and determine whether to proceed with delivery of therapeutic, in addition to\\nretrieve data necessary to develop an optimal radiation treatment process for the patient. The field\\nplacement simulation documents any change seen in overall tumor volume documented in the patient’s\\nrecord, allows the clinician to indicate any needed changes in the treatment plan and/or prescription,\\nprovides diagnostic evaluation as the basis for performing the therapeutic procedure, and clearly identifies\\nthe information needed to decide to proceed with the therapeutic procedure. This process includes\\nverification of the treatment port(s) and proper treatment positioning. All treatment ports were\\nphotographed within electronic medical records. The patient's lead blocking along with gross tumor volume\\nand margin was confirmed. Considering superficial radiotherapy is clinical in setup, this requires the physician\\nand radiation therapist to clarify the location interest being treated against initial images, ultrasound,\\npathology, and patient anatomy. Care was taken to ensure pedersen treated were geometrically accurate and\\nproperly positioned using therapeutic radiology simulation-aided field setting verification per fraction. This\\nprocess is also utilized to determine if any prescription or setup changes are necessary. These steps are\\ntherefore medically necessary to ensure safe and effective administration of radiation. Ongoing therapeutic\\nradiology simulation-aided field setting verification is ordered throughout the course of therapy.\\nA high-frequency ultrasound image was acquired today for a two-dimensional evaluation of the tumor\\nvolume, depth, width, breadth, review of prior response to treatment, provide geometric accuracy of field\\nplacement, and determine whether to proceed with therapeutic delivery.\\nHigh frequency ultrasound depth is 1.42 mm, which is +0.03 mm in difference from previous imaging.\\nThe field placement and ultrasound imaging, per fraction, is separate and distinct from the initial simulation\\nand is an important task in providing safe administration of superficial radiation therapy. Physician evaluation\\nof the ultrasound information will be ongoing throughout the course of treatment and is deemed medically\\nnecessary to ensure the efficacy of treatment, whether to proceed with therapeutic delivery, and determine\\aviva necessary changes. Today's images were evaluated for determination of continuation of treatment with\\nthe current plan or with necessary changes as appropriate. According to the review of verification\\ntherapeutic radiology simulation-aided field setting and imaging, no change is required.\\nAdditionally, the use of ultrasound visualization and targeted assessment allows the patient to be able to see\\nher cancer progress, encouraging the patient to complete and maintain compliance through the full\\ncourse of prescribed radiotherapy. Per Dr. Martinez, continued ultrasound guidance and therapeutic radiology\\nsimulation-aided field setting verification per fraction is required for field placement, measurement of tumor\\ndepth, tissues evaluation, progress, acute effect monitoring, and determination for therapeutic treatment\\ndelivery is appropriate

## 2023-02-09 NOTE — PROCEDURE: SUPERFICIAL RADIATION TREATMENT
Patient was seen by Dr Lisa Gutierrez and had her procedure  When would you like to follow-up with the patient? Information: Selecting Yes will display possible errors in your note based on the variables you have selected. This validation is only offered as a suggestion for you. PLEASE NOTE THAT THE VALIDATION TEXT WILL BE REMOVED WHEN YOU FINALIZE YOUR NOTE. IF YOU WANT TO FAX A PRELIMINARY NOTE YOU WILL NEED TO TOGGLE THIS TO 'NO' IF YOU DO NOT WANT IT IN YOUR FAXED NOTE.

## 2023-02-11 ENCOUNTER — APPOINTMENT (OUTPATIENT)
Dept: URBAN - METROPOLITAN AREA CLINIC 268 | Age: 78
Setting detail: DERMATOLOGY
End: 2023-03-31

## 2023-02-11 PROCEDURE — 77336 RADIATION PHYSICS CONSULT: CPT

## 2023-02-13 ENCOUNTER — APPOINTMENT (OUTPATIENT)
Dept: URBAN - METROPOLITAN AREA CLINIC 268 | Age: 78
Setting detail: DERMATOLOGY
End: 2023-02-17

## 2023-02-13 PROBLEM — C44.311 BASAL CELL CARCINOMA OF SKIN OF NOSE: Status: ACTIVE | Noted: 2023-02-13

## 2023-02-13 PROCEDURE — OTHER TREATMENT REGIMEN: OTHER

## 2023-02-13 PROCEDURE — 77401 RADIATION TX DELIVERY SUPFC: CPT

## 2023-02-13 PROCEDURE — G6001 ECHO GUIDANCE RADIOTHERAPY: HCPCS | Mod: XU

## 2023-02-13 PROCEDURE — OTHER SUPERFICIAL RADIATION TREATMENT: OTHER

## 2023-02-13 PROCEDURE — 77280 THER RAD SIMULAJ FIELD SMPL: CPT

## 2023-02-13 NOTE — PROCEDURE: SUPERFICIAL RADIATION TREATMENT
Bill For Simulation: Yes - (Simple Simulation: 99939) Bill For Simulation?: Yes - (Simple Simulation: 04024)

## 2023-02-13 NOTE — PROCEDURE: TREATMENT REGIMEN
Plan: Physician Orders: Radiotherapy Treatment Plan: Superficial Radiotherapy with Ultrasound\\nPlan: This patient has been treated today with image-guided superficial radiation therapy for non-melanoma\\nskin cancer. Written informed consent has been previously obtained from this patient for this treatment. This\\nconsent is documented in the patient's chart. The patient gave verbal consent to continue treatment today.\\nThe patient was treated with a specific radiation dose and setup as prescribed by the provider listed on this\\nvisit note. A Radiation Therapist performed administration of radiation under the supervision of a provider.\\nThe treatment parameters and cumulative dose are indicated above. Prior to administering the radiation, the\\npatient underwent a verification therapeutic radiology simulation-aided field setting defining relevant normal\\nand abnormal target anatomy and acquiring images with a separate and distinct diagnostic high-frequency\\nultrasound to delineate tissues and determine whether to proceed with delivery of therapeutic, in addition to\\nretrieve data necessary to develop an optimal radiation treatment process for the patient. The field\\nplacement simulation documents any change seen in overall tumor volume documented in the patient’s\\nrecord, allows the clinician to indicate any needed changes in the treatment plan and/or prescription,\\nprovides diagnostic evaluation as the basis for performing the therapeutic procedure, and clearly identifies\\nthe information needed to decide to proceed with the therapeutic procedure. This process includes\\nverification of the treatment port(s) and proper treatment positioning. All treatment ports were\\nphotographed within electronic medical records. The patient's lead blocking along with gross tumor volume\\nand margin was confirmed. Considering superficial radiotherapy is clinical in setup, this requires the physician\\nand radiation therapist to clarify the location interest being treated against initial images, ultrasound,\\npathology, and patient anatomy. Care was taken to ensure pedersen treated were geometrically accurate and\\nproperly positioned using therapeutic radiology simulation-aided field setting verification per fraction. This\\nprocess is also utilized to determine if any prescription or setup changes are necessary. These steps are\\ntherefore medically necessary to ensure safe and effective administration of radiation. Ongoing therapeutic\\nradiology simulation-aided field setting verification is ordered throughout the course of therapy.\\nA high-frequency ultrasound image was acquired today for a two-dimensional evaluation of the tumor\\nvolume, depth, width, breadth, review of prior response to treatment, provide geometric accuracy of field\\nplacement, and determine whether to proceed with therapeutic delivery.\\nHigh frequency ultrasound depth is 1.56 mm, which is +0.14 mm in difference from previous imaging.\\nThe field placement and ultrasound imaging, per fraction, is separate and distinct from the initial simulation\\nand is an important task in providing safe administration of superficial radiation therapy. Physician evaluation\\nof the ultrasound information will be ongoing throughout the course of treatment and is deemed medically\\nnecessary to ensure the efficacy of treatment, whether to proceed with therapeutic delivery, and determine\\aviva necessary changes. Today's images were evaluated for determination of continuation of treatment with\\nthe current plan or with necessary changes as appropriate. According to the review of verification\\ntherapeutic radiology simulation-aided field setting and imaging, no change is required.\\nAdditionally, the use of ultrasound visualization and targeted assessment allows the patient to be able to see\\nher cancer progress, encouraging the patient to complete and maintain compliance through the full\\ncourse of prescribed radiotherapy. Per Dr. Martinez, continued ultrasound guidance and therapeutic radiology\\nsimulation-aided field setting verification per fraction is required for field placement, measurement of tumor\\ndepth, tissues evaluation, progress, acute effect monitoring, and determination for therapeutic treatment\\ndelivery is appropriate
Detail Level: Zone

## 2023-02-14 ENCOUNTER — APPOINTMENT (OUTPATIENT)
Dept: URBAN - METROPOLITAN AREA CLINIC 268 | Age: 78
Setting detail: DERMATOLOGY
End: 2023-02-17

## 2023-02-14 PROBLEM — C44.311 BASAL CELL CARCINOMA OF SKIN OF NOSE: Status: ACTIVE | Noted: 2023-02-14

## 2023-02-14 PROCEDURE — 77401 RADIATION TX DELIVERY SUPFC: CPT

## 2023-02-14 PROCEDURE — OTHER SUPERFICIAL RADIATION TREATMENT: OTHER

## 2023-02-14 PROCEDURE — OTHER TREATMENT REGIMEN: OTHER

## 2023-02-14 PROCEDURE — G6001 ECHO GUIDANCE RADIOTHERAPY: HCPCS | Mod: XU

## 2023-02-14 PROCEDURE — 77280 THER RAD SIMULAJ FIELD SMPL: CPT

## 2023-02-14 NOTE — PROCEDURE: SUPERFICIAL RADIATION TREATMENT
Bill For Simulation?: Yes - (Simple Simulation: 69007) Bill For Simulation: Yes - (Simple Simulation: 25960)

## 2023-02-14 NOTE — PROCEDURE: TREATMENT REGIMEN
Detail Level: Zone
Plan: Physician Orders: Radiotherapy Treatment Plan: Superficial Radiotherapy with Ultrasound\\nPlan: This patient has been treated today with image-guided superficial radiation therapy for non-melanoma\\nskin cancer. Written informed consent has been previously obtained from this patient for this treatment. This\\nconsent is documented in the patient's chart. The patient gave verbal consent to continue treatment today.\\nThe patient was treated with a specific radiation dose and setup as prescribed by the provider listed on this\\nvisit note. A Radiation Therapist performed administration of radiation under the supervision of a provider.\\nThe treatment parameters and cumulative dose are indicated above. Prior to administering the radiation, the\\npatient underwent a verification therapeutic radiology simulation-aided field setting defining relevant normal\\nand abnormal target anatomy and acquiring images with a separate and distinct diagnostic high-frequency\\nultrasound to delineate tissues and determine whether to proceed with delivery of therapeutic, in addition to\\nretrieve data necessary to develop an optimal radiation treatment process for the patient. The field\\nplacement simulation documents any change seen in overall tumor volume documented in the patient’s\\nrecord, allows the clinician to indicate any needed changes in the treatment plan and/or prescription,\\nprovides diagnostic evaluation as the basis for performing the therapeutic procedure, and clearly identifies\\nthe information needed to decide to proceed with the therapeutic procedure. This process includes\\nverification of the treatment port(s) and proper treatment positioning. All treatment ports were\\nphotographed within electronic medical records. The patient's lead blocking along with gross tumor volume\\nand margin was confirmed. Considering superficial radiotherapy is clinical in setup, this requires the physician\\nand radiation therapist to clarify the location interest being treated against initial images, ultrasound,\\npathology, and patient anatomy. Care was taken to ensure pedersen treated were geometrically accurate and\\nproperly positioned using therapeutic radiology simulation-aided field setting verification per fraction. This\\nprocess is also utilized to determine if any prescription or setup changes are necessary. These steps are\\ntherefore medically necessary to ensure safe and effective administration of radiation. Ongoing therapeutic\\nradiology simulation-aided field setting verification is ordered throughout the course of therapy.\\nA high-frequency ultrasound image was acquired today for a two-dimensional evaluation of the tumor\\nvolume, depth, width, breadth, review of prior response to treatment, provide geometric accuracy of field\\nplacement, and determine whether to proceed with therapeutic delivery.\\nHigh frequency ultrasound depth is 1.45 mm, which is -0.11 mm in difference from previous imaging.\\nThe field placement and ultrasound imaging, per fraction, is separate and distinct from the initial simulation\\nand is an important task in providing safe administration of superficial radiation therapy. Physician evaluation\\nof the ultrasound information will be ongoing throughout the course of treatment and is deemed medically\\nnecessary to ensure the efficacy of treatment, whether to proceed with therapeutic delivery, and determine\\aviva necessary changes. Today's images were evaluated for determination of continuation of treatment with\\nthe current plan or with necessary changes as appropriate. According to the review of verification\\ntherapeutic radiology simulation-aided field setting and imaging, no change is required.\\nAdditionally, the use of ultrasound visualization and targeted assessment allows the patient to be able to see\\nher cancer progress, encouraging the patient to complete and maintain compliance through the full\\ncourse of prescribed radiotherapy. Per Dr. Martinez, continued ultrasound guidance and therapeutic radiology\\nsimulation-aided field setting verification per fraction is required for field placement, measurement of tumor\\ndepth, tissues evaluation, progress, acute effect monitoring, and determination for therapeutic treatment\\ndelivery is appropriate

## 2023-02-15 ENCOUNTER — APPOINTMENT (OUTPATIENT)
Dept: URBAN - METROPOLITAN AREA CLINIC 268 | Age: 78
Setting detail: DERMATOLOGY
End: 2023-03-02

## 2023-02-15 PROBLEM — C44.311 BASAL CELL CARCINOMA OF SKIN OF NOSE: Status: ACTIVE | Noted: 2023-02-15

## 2023-02-15 PROCEDURE — OTHER TREATMENT REGIMEN: OTHER

## 2023-02-15 PROCEDURE — OTHER SUPERFICIAL RADIATION TREATMENT: OTHER

## 2023-02-15 PROCEDURE — 77427 RADIATION TX MANAGEMENT X5: CPT

## 2023-02-15 PROCEDURE — 77401 RADIATION TX DELIVERY SUPFC: CPT

## 2023-02-15 PROCEDURE — 77280 THER RAD SIMULAJ FIELD SMPL: CPT

## 2023-02-15 PROCEDURE — G6001 ECHO GUIDANCE RADIOTHERAPY: HCPCS | Mod: XU

## 2023-02-15 NOTE — PROCEDURE: TREATMENT REGIMEN
Detail Level: Zone
Plan: Physician Orders: Radiotherapy Treatment Plan: Superficial Radiotherapy with Ultrasound\\nPlan: This patient has been treated today with image-guided superficial radiation therapy for non-melanoma\\nskin cancer. Written informed consent has been previously obtained from this patient for this treatment. This\\nconsent is documented in the patient's chart. The patient gave verbal consent to continue treatment today.\\nThe patient was treated with a specific radiation dose and setup as prescribed by the provider listed on this\\nvisit note. A Radiation Therapist performed administration of radiation under the supervision of a provider.\\nThe treatment parameters and cumulative dose are indicated above. Prior to administering the radiation, the\\npatient underwent a verification therapeutic radiology simulation-aided field setting defining relevant normal\\nand abnormal target anatomy and acquiring images with a separate and distinct diagnostic high-frequency\\nultrasound to delineate tissues and determine whether to proceed with delivery of therapeutic, in addition to\\nretrieve data necessary to develop an optimal radiation treatment process for the patient. The field\\nplacement simulation documents any change seen in overall tumor volume documented in the patient’s\\nrecord, allows the clinician to indicate any needed changes in the treatment plan and/or prescription,\\nprovides diagnostic evaluation as the basis for performing the therapeutic procedure, and clearly identifies\\nthe information needed to decide to proceed with the therapeutic procedure. This process includes\\nverification of the treatment port(s) and proper treatment positioning. All treatment ports were\\nphotographed within electronic medical records. The patient's lead blocking along with gross tumor volume\\nand margin was confirmed. Considering superficial radiotherapy is clinical in setup, this requires the physician\\nand radiation therapist to clarify the location interest being treated against initial images, ultrasound,\\npathology, and patient anatomy. Care was taken to ensure pedersen treated were geometrically accurate and\\nproperly positioned using therapeutic radiology simulation-aided field setting verification per fraction. This\\nprocess is also utilized to determine if any prescription or setup changes are necessary. These steps are\\ntherefore medically necessary to ensure safe and effective administration of radiation. Ongoing therapeutic\\nradiology simulation-aided field setting verification is ordered throughout the course of therapy.\\nA high-frequency ultrasound image was acquired today for a two-dimensional evaluation of the tumor\\nvolume, depth, width, breadth, review of prior response to treatment, provide geometric accuracy of field\\nplacement, and determine whether to proceed with therapeutic delivery.\\nHigh frequency ultrasound depth is 1.98 mm, which is +0.53 mm in difference from previous imaging.\\nThe field placement and ultrasound imaging, per fraction, is separate and distinct from the initial simulation\\nand is an important task in providing safe administration of superficial radiation therapy. Physician evaluation\\nof the ultrasound information will be ongoing throughout the course of treatment and is deemed medically\\nnecessary to ensure the efficacy of treatment, whether to proceed with therapeutic delivery, and determine\\aviva necessary changes. Today's images were evaluated for determination of continuation of treatment with\\nthe current plan or with necessary changes as appropriate. According to the review of verification\\ntherapeutic radiology simulation-aided field setting and imaging, no change is required.\\nAdditionally, the use of ultrasound visualization and targeted assessment allows the patient to be able to see\\nher cancer progress, encouraging the patient to complete and maintain compliance through the full\\ncourse of prescribed radiotherapy. Per Dr. Martinez, continued ultrasound guidance and therapeutic radiology\\nsimulation-aided field setting verification per fraction is required for field placement, measurement of tumor\\ndepth, tissues evaluation, progress, acute effect monitoring, and determination for therapeutic treatment\\ndelivery is appropriate\\n\\nPhysician Orders: Plan: On Treatment Visit (OTV) with Ultrasound\\nPlan: The patient is undergoing superficial radiation therapy for skin cancer and presents for weekly\\nevaluation and management. Per protocol and as documented on the flow sheet, the patient was questioned\\jaskaran to subjective redness, pruritus, pain, drainage, fatigue, or any other symptoms. Objectively, the radiation\\narea was evaluated with regards to erythema, atrophy, scale, crusting, erosion, ulceration, edema, purpura,\\ntenderness, warmth, drainage, and any other findings. The plan was extensively reviewed including dose and\\ndosing schedule. The simulation and clinical setup were also reviewed as were external and any internal\\nshields and based on this review the appropriateness and sufficiency of treatment was determined.\\nA high-frequency ultrasound image was acquired today for a two-dimensional evaluation of the tumor\\nvolume, depth, width, breadth, review of prior response to treatment, provide geometric accuracy of field\\nplacement, and determine whether to proceed with therapeutic delivery.\\nHigh frequency ultrasound depth is 1.98 mm, which is 0.53 mm in difference from previous imaging.\\nPer Dr. Martinez, continued ultrasound guidance and therapeutic radiology simulation-aided field setting\\nverification per fraction is required for field placement, measurement of tumor depth, tissues evaluation,\\nprogress, acute effect monitoring, and determination for therapeutic treatment delivery is appropriate.\\n

## 2023-02-15 NOTE — PROCEDURE: SUPERFICIAL RADIATION TREATMENT
Bill For Simulation: Yes - (Simple Simulation: 10907) Bill For Simulation?: Yes - (Simple Simulation: 65597)

## 2023-02-16 ENCOUNTER — APPOINTMENT (OUTPATIENT)
Dept: URBAN - METROPOLITAN AREA CLINIC 268 | Age: 78
Setting detail: DERMATOLOGY
End: 2023-02-21

## 2023-02-16 PROBLEM — C44.311 BASAL CELL CARCINOMA OF SKIN OF NOSE: Status: ACTIVE | Noted: 2023-02-16

## 2023-02-16 PROCEDURE — G6001 ECHO GUIDANCE RADIOTHERAPY: HCPCS | Mod: XU

## 2023-02-16 PROCEDURE — OTHER SUPERFICIAL RADIATION TREATMENT: OTHER

## 2023-02-16 PROCEDURE — 77280 THER RAD SIMULAJ FIELD SMPL: CPT

## 2023-02-16 PROCEDURE — 77401 RADIATION TX DELIVERY SUPFC: CPT

## 2023-02-16 PROCEDURE — OTHER TREATMENT REGIMEN: OTHER

## 2023-02-16 NOTE — PROCEDURE: SUPERFICIAL RADIATION TREATMENT
Bill For Simulation?: Yes - (Simple Simulation: 94167) Bill For Simulation: Yes - (Simple Simulation: 24616)

## 2023-02-20 ENCOUNTER — APPOINTMENT (OUTPATIENT)
Dept: URBAN - METROPOLITAN AREA CLINIC 268 | Age: 78
Setting detail: DERMATOLOGY
End: 2023-02-21

## 2023-02-20 PROBLEM — C44.311 BASAL CELL CARCINOMA OF SKIN OF NOSE: Status: ACTIVE | Noted: 2023-02-20

## 2023-02-20 PROCEDURE — G6001 ECHO GUIDANCE RADIOTHERAPY: HCPCS | Mod: XU

## 2023-02-20 PROCEDURE — 77401 RADIATION TX DELIVERY SUPFC: CPT

## 2023-02-20 PROCEDURE — OTHER SUPERFICIAL RADIATION TREATMENT: OTHER

## 2023-02-20 PROCEDURE — 77280 THER RAD SIMULAJ FIELD SMPL: CPT

## 2023-02-20 PROCEDURE — OTHER TREATMENT REGIMEN: OTHER

## 2023-02-20 NOTE — PROCEDURE: SUPERFICIAL RADIATION TREATMENT
Bill For Simulation: Yes - (Simple Simulation: 66290) Bill For Simulation?: Yes - (Simple Simulation: 46857)

## 2023-02-20 NOTE — PROCEDURE: TREATMENT REGIMEN
Notified pt of abnormal H&H. Pt instructed to take an iron supplement once daily. Pt verbalized understanding. All questions answered. meds sent to pt poc  
Plan: Physician Orders: Radiotherapy Treatment Plan: Superficial Radiotherapy with Ultrasound\\nPlan: This patient has been treated today with image-guided superficial radiation therapy for non-melanoma\\nskin cancer. Written informed consent has been previously obtained from this patient for this treatment. This\\nconsent is documented in the patient's chart. The patient gave verbal consent to continue treatment today.\\nThe patient was treated with a specific radiation dose and setup as prescribed by the provider listed on this\\nvisit note. A Radiation Therapist performed administration of radiation under the supervision of a provider.\\nThe treatment parameters and cumulative dose are indicated above. Prior to administering the radiation, the\\npatient underwent a verification therapeutic radiology simulation-aided field setting defining relevant normal\\nand abnormal target anatomy and acquiring images with a separate and distinct diagnostic high-frequency\\nultrasound to delineate tissues and determine whether to proceed with delivery of therapeutic, in addition to\\nretrieve data necessary to develop an optimal radiation treatment process for the patient. The field\\nplacement simulation documents any change seen in overall tumor volume documented in the patient’s\\nrecord, allows the clinician to indicate any needed changes in the treatment plan and/or prescription,\\nprovides diagnostic evaluation as the basis for performing the therapeutic procedure, and clearly identifies\\nthe information needed to decide to proceed with the therapeutic procedure. This process includes\\nverification of the treatment port(s) and proper treatment positioning. All treatment ports were\\nphotographed within electronic medical records. The patient's lead blocking along with gross tumor volume\\nand margin was confirmed. Considering superficial radiotherapy is clinical in setup, this requires the physician\\nand radiation therapist to clarify the location interest being treated against initial images, ultrasound,\\npathology, and patient anatomy. Care was taken to ensure pedersen treated were geometrically accurate and\\nproperly positioned using therapeutic radiology simulation-aided field setting verification per fraction. This\\nprocess is also utilized to determine if any prescription or setup changes are necessary. These steps are\\ntherefore medically necessary to ensure safe and effective administration of radiation. Ongoing therapeutic\\nradiology simulation-aided field setting verification is ordered throughout the course of therapy.\\nA high-frequency ultrasound image was acquired today for a two-dimensional evaluation of the tumor\\nvolume, depth, width, breadth, review of prior response to treatment, provide geometric accuracy of field\\nplacement, and determine whether to proceed with therapeutic delivery.\\nHigh frequency ultrasound depth is 1.12 mm, which is -0.07 mm in difference from previous imaging.\\nThe field placement and ultrasound imaging, per fraction, is separate and distinct from the initial simulation\\nand is an important task in providing safe administration of superficial radiation therapy. Physician evaluation\\nof the ultrasound information will be ongoing throughout the course of treatment and is deemed medically\\nnecessary to ensure the efficacy of treatment, whether to proceed with therapeutic delivery, and determine\\aviva necessary changes. Today's images were evaluated for determination of continuation of treatment with\\nthe current plan or with necessary changes as appropriate. According to the review of verification\\ntherapeutic radiology simulation-aided field setting and imaging, no change is required.\\nAdditionally, the use of ultrasound visualization and targeted assessment allows the patient to be able to see\\nher cancer progress, encouraging the patient to complete and maintain compliance through the full\\ncourse of prescribed radiotherapy. Per Dr. Martinez, continued ultrasound guidance and therapeutic radiology\\nsimulation-aided field setting verification per fraction is required for field placement, measurement of tumor\\ndepth, tissues evaluation, progress, acute effect monitoring, and determination for therapeutic treatment\\ndelivery is appropriate\\n
Detail Level: Zone

## 2023-02-21 ENCOUNTER — APPOINTMENT (OUTPATIENT)
Dept: URBAN - METROPOLITAN AREA CLINIC 268 | Age: 78
Setting detail: DERMATOLOGY
End: 2023-02-22

## 2023-02-21 PROBLEM — C44.311 BASAL CELL CARCINOMA OF SKIN OF NOSE: Status: ACTIVE | Noted: 2023-02-21

## 2023-02-21 PROCEDURE — G6001 ECHO GUIDANCE RADIOTHERAPY: HCPCS | Mod: XU

## 2023-02-21 PROCEDURE — OTHER TREATMENT REGIMEN: OTHER

## 2023-02-21 PROCEDURE — 77401 RADIATION TX DELIVERY SUPFC: CPT

## 2023-02-21 PROCEDURE — OTHER SUPERFICIAL RADIATION TREATMENT: OTHER

## 2023-02-21 PROCEDURE — 77280 THER RAD SIMULAJ FIELD SMPL: CPT

## 2023-02-21 NOTE — PROCEDURE: TREATMENT REGIMEN
Detail Level: Zone
Plan: Physician Orders: Radiotherapy Treatment Plan: Superficial Radiotherapy with Ultrasound\\nPlan: This patient has been treated today with image-guided superficial radiation therapy for non-melanoma\\nskin cancer. Written informed consent has been previously obtained from this patient for this treatment. This\\nconsent is documented in the patient's chart. The patient gave verbal consent to continue treatment today.\\nThe patient was treated with a specific radiation dose and setup as prescribed by the provider listed on this\\nvisit note. A Radiation Therapist performed administration of radiation under the supervision of a provider.\\nThe treatment parameters and cumulative dose are indicated above. Prior to administering the radiation, the\\npatient underwent a verification therapeutic radiology simulation-aided field setting defining relevant normal\\nand abnormal target anatomy and acquiring images with a separate and distinct diagnostic high-frequency\\nultrasound to delineate tissues and determine whether to proceed with delivery of therapeutic, in addition to\\nretrieve data necessary to develop an optimal radiation treatment process for the patient. The field\\nplacement simulation documents any change seen in overall tumor volume documented in the patient’s\\nrecord, allows the clinician to indicate any needed changes in the treatment plan and/or prescription,\\nprovides diagnostic evaluation as the basis for performing the therapeutic procedure, and clearly identifies\\nthe information needed to decide to proceed with the therapeutic procedure. This process includes\\nverification of the treatment port(s) and proper treatment positioning. All treatment ports were\\nphotographed within electronic medical records. The patient's lead blocking along with gross tumor volume\\nand margin was confirmed. Considering superficial radiotherapy is clinical in setup, this requires the physician\\nand radiation therapist to clarify the location interest being treated against initial images, ultrasound,\\npathology, and patient anatomy. Care was taken to ensure pedersen treated were geometrically accurate and\\nproperly positioned using therapeutic radiology simulation-aided field setting verification per fraction. This\\nprocess is also utilized to determine if any prescription or setup changes are necessary. These steps are\\ntherefore medically necessary to ensure safe and effective administration of radiation. Ongoing therapeutic\\nradiology simulation-aided field setting verification is ordered throughout the course of therapy.\\nA high-frequency ultrasound image was acquired today for a two-dimensional evaluation of the tumor\\nvolume, depth, width, breadth, review of prior response to treatment, provide geometric accuracy of field\\nplacement, and determine whether to proceed with therapeutic delivery.\\nHigh frequency ultrasound depth is 1.49 mm, which is +0.37 mm in difference from previous imaging.\\nThe field placement and ultrasound imaging, per fraction, is separate and distinct from the initial simulation\\nand is an important task in providing safe administration of superficial radiation therapy. Physician evaluation\\nof the ultrasound information will be ongoing throughout the course of treatment and is deemed medically\\nnecessary to ensure the efficacy of treatment, whether to proceed with therapeutic delivery, and determine\\aviva necessary changes. Today's images were evaluated for determination of continuation of treatment with\\nthe current plan or with necessary changes as appropriate. According to the review of verification\\ntherapeutic radiology simulation-aided field setting and imaging, no change is required.\\nAdditionally, the use of ultrasound visualization and targeted assessment allows the patient to be able to see\\nher cancer progress, encouraging the patient to complete and maintain compliance through the full\\ncourse of prescribed radiotherapy. Per Dr. Martinez, continued ultrasound guidance and therapeutic radiology\\nsimulation-aided field setting verification per fraction is required for field placement, measurement of tumor\\ndepth, tissues evaluation, progress, acute effect monitoring, and determination for therapeutic treatment\\ndelivery is appropriate\\n

## 2023-02-21 NOTE — PROCEDURE: SUPERFICIAL RADIATION TREATMENT
Bill For Simulation: Yes - (Simple Simulation: 05428) Bill For Simulation?: Yes - (Simple Simulation: 19751)

## 2023-02-22 ENCOUNTER — APPOINTMENT (OUTPATIENT)
Dept: URBAN - METROPOLITAN AREA CLINIC 268 | Age: 78
Setting detail: DERMATOLOGY
End: 2023-02-24

## 2023-02-22 PROBLEM — C44.311 BASAL CELL CARCINOMA OF SKIN OF NOSE: Status: ACTIVE | Noted: 2023-02-22

## 2023-02-22 PROCEDURE — OTHER SUPERFICIAL RADIATION TREATMENT: OTHER

## 2023-02-22 PROCEDURE — 77401 RADIATION TX DELIVERY SUPFC: CPT

## 2023-02-22 PROCEDURE — G6001 ECHO GUIDANCE RADIOTHERAPY: HCPCS | Mod: XU

## 2023-02-22 PROCEDURE — 77280 THER RAD SIMULAJ FIELD SMPL: CPT

## 2023-02-22 PROCEDURE — OTHER TREATMENT REGIMEN: OTHER

## 2023-02-22 NOTE — PROCEDURE: TREATMENT REGIMEN
Plan: Physician Orders: Radiotherapy Treatment Plan: Superficial Radiotherapy with Ultrasound\\nPlan: This patient has been treated today with image-guided superficial radiation therapy for non-melanoma\\nskin cancer. Written informed consent has been previously obtained from this patient for this treatment. This\\nconsent is documented in the patient's chart. The patient gave verbal consent to continue treatment today.\\nThe patient was treated with a specific radiation dose and setup as prescribed by the provider listed on this\\nvisit note. A Radiation Therapist performed administration of radiation under the supervision of a provider.\\nThe treatment parameters and cumulative dose are indicated above. Prior to administering the radiation, the\\npatient underwent a verification therapeutic radiology simulation-aided field setting defining relevant normal\\nand abnormal target anatomy and acquiring images with a separate and distinct diagnostic high-frequency\\nultrasound to delineate tissues and determine whether to proceed with delivery of therapeutic, in addition to\\nretrieve data necessary to develop an optimal radiation treatment process for the patient. The field\\nplacement simulation documents any change seen in overall tumor volume documented in the patient’s\\nrecord, allows the clinician to indicate any needed changes in the treatment plan and/or prescription,\\nprovides diagnostic evaluation as the basis for performing the therapeutic procedure, and clearly identifies\\nthe information needed to decide to proceed with the therapeutic procedure. This process includes\\nverification of the treatment port(s) and proper treatment positioning. All treatment ports were\\nphotographed within electronic medical records. The patient's lead blocking along with gross tumor volume\\nand margin was confirmed. Considering superficial radiotherapy is clinical in setup, this requires the physician\\nand radiation therapist to clarify the location interest being treated against initial images, ultrasound,\\npathology, and patient anatomy. Care was taken to ensure pedersen treated were geometrically accurate and\\nproperly positioned using therapeutic radiology simulation-aided field setting verification per fraction. This\\nprocess is also utilized to determine if any prescription or setup changes are necessary. These steps are\\ntherefore medically necessary to ensure safe and effective administration of radiation. Ongoing therapeutic\\nradiology simulation-aided field setting verification is ordered throughout the course of therapy.\\nA high-frequency ultrasound image was acquired today for a two-dimensional evaluation of the tumor\\nvolume, depth, width, breadth, review of prior response to treatment, provide geometric accuracy of field\\nplacement, and determine whether to proceed with therapeutic delivery.\\nHigh frequency ultrasound depth is 1.49 mm, which is 0.0 mm in difference from previous imaging.\\nThe field placement and ultrasound imaging, per fraction, is separate and distinct from the initial simulation\\nand is an important task in providing safe administration of superficial radiation therapy. Physician evaluation\\nof the ultrasound information will be ongoing throughout the course of treatment and is deemed medically\\nnecessary to ensure the efficacy of treatment, whether to proceed with therapeutic delivery, and determine\\aviva necessary changes. Today's images were evaluated for determination of continuation of treatment with\\nthe current plan or with necessary changes as appropriate. According to the review of verification\\ntherapeutic radiology simulation-aided field setting and imaging, no change is required.\\nAdditionally, the use of ultrasound visualization and targeted assessment allows the patient to be able to see\\nher cancer progress, encouraging the patient to complete and maintain compliance through the full\\ncourse of prescribed radiotherapy. Per Dr. Martinez, continued ultrasound guidance and therapeutic radiology\\nsimulation-aided field setting verification per fraction is required for field placement, measurement of tumor\\ndepth, tissues evaluation, progress, acute effect monitoring, and determination for therapeutic treatment\\ndelivery is appropriate\\n
Detail Level: Zone

## 2023-02-22 NOTE — PROCEDURE: SUPERFICIAL RADIATION TREATMENT
Bill For Simulation?: Yes - (Simple Simulation: 81465) Bill For Simulation: Yes - (Simple Simulation: 35663)

## 2023-02-23 ENCOUNTER — APPOINTMENT (OUTPATIENT)
Dept: URBAN - METROPOLITAN AREA CLINIC 268 | Age: 78
Setting detail: DERMATOLOGY
End: 2023-02-24

## 2023-02-23 PROBLEM — C44.311 BASAL CELL CARCINOMA OF SKIN OF NOSE: Status: ACTIVE | Noted: 2023-02-23

## 2023-02-23 PROCEDURE — 77427 RADIATION TX MANAGEMENT X5: CPT

## 2023-02-23 PROCEDURE — 77280 THER RAD SIMULAJ FIELD SMPL: CPT

## 2023-02-23 PROCEDURE — 77401 RADIATION TX DELIVERY SUPFC: CPT

## 2023-02-23 PROCEDURE — G6001 ECHO GUIDANCE RADIOTHERAPY: HCPCS | Mod: XU

## 2023-02-23 PROCEDURE — OTHER TREATMENT REGIMEN: OTHER

## 2023-02-23 PROCEDURE — OTHER SUPERFICIAL RADIATION TREATMENT: OTHER

## 2023-02-23 NOTE — PROCEDURE: SUPERFICIAL RADIATION TREATMENT
Bill For Simulation: Yes - (Simple Simulation: 31949) Bill For Simulation?: Yes - (Simple Simulation: 42799)

## 2023-02-23 NOTE — PROCEDURE: TREATMENT REGIMEN
Detail Level: Zone
Plan: Physician Orders: Radiotherapy Treatment Plan: Superficial Radiotherapy with Ultrasound\\nPlan: This patient has been treated today with image-guided superficial radiation therapy for non-melanoma\\nskin cancer. Written informed consent has been previously obtained from this patient for this treatment. This\\nconsent is documented in the patient's chart. The patient gave verbal consent to continue treatment today.\\nThe patient was treated with a specific radiation dose and setup as prescribed by the provider listed on this\\nvisit note. A Radiation Therapist performed administration of radiation under the supervision of a provider.\\nThe treatment parameters and cumulative dose are indicated above. Prior to administering the radiation, the\\npatient underwent a verification therapeutic radiology simulation-aided field setting defining relevant normal\\nand abnormal target anatomy and acquiring images with a separate and distinct diagnostic high-frequency\\nultrasound to delineate tissues and determine whether to proceed with delivery of therapeutic, in addition to\\nretrieve data necessary to develop an optimal radiation treatment process for the patient. The field\\nplacement simulation documents any change seen in overall tumor volume documented in the patient’s\\nrecord, allows the clinician to indicate any needed changes in the treatment plan and/or prescription,\\nprovides diagnostic evaluation as the basis for performing the therapeutic procedure, and clearly identifies\\nthe information needed to decide to proceed with the therapeutic procedure. This process includes\\nverification of the treatment port(s) and proper treatment positioning. All treatment ports were\\nphotographed within electronic medical records. The patient's lead blocking along with gross tumor volume\\nand margin was confirmed. Considering superficial radiotherapy is clinical in setup, this requires the physician\\nand radiation therapist to clarify the location interest being treated against initial images, ultrasound,\\npathology, and patient anatomy. Care was taken to ensure pedersen treated were geometrically accurate and\\nproperly positioned using therapeutic radiology simulation-aided field setting verification per fraction. This\\nprocess is also utilized to determine if any prescription or setup changes are necessary. These steps are\\ntherefore medically necessary to ensure safe and effective administration of radiation. Ongoing therapeutic\\nradiology simulation-aided field setting verification is ordered throughout the course of therapy.\\nA high-frequency ultrasound image was acquired today for a two-dimensional evaluation of the tumor\\nvolume, depth, width, breadth, review of prior response to treatment, provide geometric accuracy of field\\nplacement, and determine whether to proceed with therapeutic delivery.\\nHigh frequency ultrasound depth is 1.61 mm, which is 0.12 mm in difference from previous imaging.\\nThe field placement and ultrasound imaging, per fraction, is separate and distinct from the initial simulation\\nand is an important task in providing safe administration of superficial radiation therapy. Physician evaluation\\nof the ultrasound information will be ongoing throughout the course of treatment and is deemed medically\\nnecessary to ensure the efficacy of treatment, whether to proceed with therapeutic delivery, and determine\\aviva necessary changes. Today's images were evaluated for determination of continuation of treatment with\\nthe current plan or with necessary changes as appropriate. According to the review of verification\\ntherapeutic radiology simulation-aided field setting and imaging, no change is required.\\nAdditionally, the use of ultrasound visualization and targeted assessment allows the patient to be able to see\\nher cancer progress, encouraging the patient to complete and maintain compliance through the full\\ncourse of prescribed radiotherapy. Per Dr. Martinez, continued ultrasound guidance and therapeutic radiology\\nsimulation-aided field setting verification per fraction is required for field placement, measurement of tumor\\ndepth, tissues evaluation, progress, acute effect monitoring, and determination for therapeutic treatment\\ndelivery is appropriate\\n\\nPhysician Orders: Plan: On Treatment Visit (OTV) with Ultrasound\\nPlan: The patient is undergoing superficial radiation therapy for skin cancer and presents for weekly\\nevaluation and management. Per protocol and as documented on the flow sheet, the patient was questioned\\jaskaran to subjective redness, pruritus, pain, drainage, fatigue, or any other symptoms. Objectively, the radiation\\narea was evaluated with regards to erythema, atrophy, scale, crusting, erosion, ulceration, edema, purpura,\\ntenderness, warmth, drainage, and any other findings. The plan was extensively reviewed including dose and\\ndosing schedule. The simulation and clinical setup were also reviewed as were external and any internal\\nshields and based on this review the appropriateness and sufficiency of treatment was determined.\\nA high-frequency ultrasound image was acquired today for a two-dimensional evaluation of the tumor\\nvolume, depth, width, breadth, review of prior response to treatment, provide geometric accuracy of field\\nplacement, and determine whether to proceed with therapeutic delivery.\\nHigh frequency ultrasound depth is 1.61 mm, which is 0.12 mm in difference from previous imaging.\\nPer Dr. Martinez, continued ultrasound guidance and therapeutic radiology simulation-aided field setting\\nverification per fraction is required for field placement, measurement of tumor depth, tissues evaluation,\\nprogress, acute effect monitoring, and determination for therapeutic treatment delivery is appropriate.

## 2023-02-23 NOTE — PROCEDURE: SUPERFICIAL RADIATION TREATMENT
Bill For Treatment Planning (One Time Charge Per Course Of Therapy): Yes - (Simple Plannin) I will SWITCH the dose or number of times a day I take the medications listed below when I get home from the hospital:  None

## 2023-02-25 ENCOUNTER — APPOINTMENT (OUTPATIENT)
Dept: URBAN - METROPOLITAN AREA CLINIC 268 | Age: 78
Setting detail: DERMATOLOGY
End: 2023-04-11

## 2023-02-25 PROCEDURE — 77336 RADIATION PHYSICS CONSULT: CPT

## 2023-03-28 ENCOUNTER — APPOINTMENT (OUTPATIENT)
Dept: URBAN - METROPOLITAN AREA CLINIC 268 | Age: 78
Setting detail: DERMATOLOGY
End: 2023-03-31

## 2023-03-28 PROBLEM — C44.91 BASAL CELL CARCINOMA OF SKIN, UNSPECIFIED: Status: ACTIVE | Noted: 2023-03-28

## 2023-03-28 PROCEDURE — 99213 OFFICE O/P EST LOW 20 MIN: CPT

## 2023-03-28 PROCEDURE — G6001 ECHO GUIDANCE RADIOTHERAPY: HCPCS

## 2023-04-24 NOTE — PROCEDURE: SUPERFICIAL RADIATION TREATMENT
Patient informed and ASA sent to the pharmacy as requested.     Will Mercado APRN [place], [place], [place]  Caller: Unspecified (Yesterday,  1:33 PM)  I see if we can get the CTA expedited to give us a better idea of what is going on.  I am not against from start 81 mg of aspirin.       Patient's wife called for echo and carotid results. Results givens to his wife. Patient is still symptomatic and will proceed with CTA. She also request status of CT head as she has not received a call about it.     DENNY Casarez   4/23/2023 11:57 PM EDT       There is no acute findings on the echocardiogram.  Keep follow-up.     DENNY Casarez   4/23/2023 11:57 PM EDT       No hemodynamically significant carotid artery disease.  There is 50 to 69% in the mid left internal carotid.  If patient is continuing to have sent symptoms such as dizziness.  A CTA of the carotid arteries would be warranted for further evaluation of significant carotid artery disease        Additional Change To Daily Dosage Administered Mid Treatment?: No

## 2023-07-20 NOTE — PROCEDURE: SUPERFICIAL RADIATION TREATMENT
The patient is a 67y Female complaining of wrist pain/injury. Bill For Simulation: Yes - (Simple Simulation: 88929) Bill For Simulation?: Yes - (Simple Simulation: 64665)

## 2023-08-24 NOTE — PROCEDURE: SUPERFICIAL RADIATION TREATMENT
Partially impaired: cannot see medication labels or newsprint, but can see obstacles in path, and the surrounding layout; can count fingers at arm's length Energy Output In Cgy/Min (Optional): 100

## 2023-09-26 NOTE — PROCEDURE: SUPERFICIAL RADIATION TREATMENT
-Has severe MR/MS/AS w/ severe pulm HTN.  -Plan per cards/HF  no plan for cardiac intervention. Medical management as above. Treatment Device Design After Initial Simulation Justification (Will Render If Bill For Treatment Devices = Yes): The patient is status post radiation simulation and is evaluated as to the use of additional devices for shielding and placement for radiation therapy.

## 2024-03-22 NOTE — PROCEDURE: TREATMENT REGIMEN
Plan: Physician Orders: Radiotherapy Treatment Plan: Superficial Radiotherapy with Ultrasound\\nPlan: This patient has been treated today with image-guided superficial radiation therapy for non-melanoma\\nskin cancer. Written informed consent has been previously obtained from this patient for this treatment. This\\nconsent is documented in the patient's chart. The patient gave verbal consent to continue treatment today.\\nThe patient was treated with a specific radiation dose and setup as prescribed by the provider listed on this\\nvisit note. A Radiation Therapist performed administration of radiation under the supervision of a provider.\\nThe treatment parameters and cumulative dose are indicated above. Prior to administering the radiation, the\\npatient underwent a verification therapeutic radiology simulation-aided field setting defining relevant normal\\nand abnormal target anatomy and acquiring images with a separate and distinct diagnostic high-frequency\\nultrasound to delineate tissues and determine whether to proceed with delivery of therapeutic, in addition to\\nretrieve data necessary to develop an optimal radiation treatment process for the patient. The field\\nplacement simulation documents any change seen in overall tumor volume documented in the patient’s\\nrecord, allows the clinician to indicate any needed changes in the treatment plan and/or prescription,\\nprovides diagnostic evaluation as the basis for performing the therapeutic procedure, and clearly identifies\\nthe information needed to decide to proceed with the therapeutic procedure. This process includes\\nverification of the treatment port(s) and proper treatment positioning. All treatment ports were\\nphotographed within electronic medical records. The patient's lead blocking along with gross tumor volume\\nand margin was confirmed. Considering superficial radiotherapy is clinical in setup, this requires the physician\\nand radiation therapist to clarify the location interest being treated against initial images, ultrasound,\\npathology, and patient anatomy. Care was taken to ensure pedersen treated were geometrically accurate and\\nproperly positioned using therapeutic radiology simulation-aided field setting verification per fraction. This\\nprocess is also utilized to determine if any prescription or setup changes are necessary. These steps are\\ntherefore medically necessary to ensure safe and effective administration of radiation. Ongoing therapeutic\\nradiology simulation-aided field setting verification is ordered throughout the course of therapy.\\nA high-frequency ultrasound image was acquired today for a two-dimensional evaluation of the tumor\\nvolume, depth, width, breadth, review of prior response to treatment, provide geometric accuracy of field\\nplacement, and determine whether to proceed with therapeutic delivery.\\nHigh frequency ultrasound depth is 1.19 mm, which is -0.79 mm in difference from previous imaging.\\nThe field placement and ultrasound imaging, per fraction, is separate and distinct from the initial simulation\\nand is an important task in providing safe administration of superficial radiation therapy. Physician evaluation\\nof the ultrasound information will be ongoing throughout the course of treatment and is deemed medically\\nnecessary to ensure the efficacy of treatment, whether to proceed with therapeutic delivery, and determine\\aviva necessary changes. Today's images were evaluated for determination of continuation of treatment with\\nthe current plan or with necessary changes as appropriate. According to the review of verification\\ntherapeutic radiology simulation-aided field setting and imaging, no change is required.\\nAdditionally, the use of ultrasound visualization and targeted assessment allows the patient to be able to see\\nher cancer progress, encouraging the patient to complete and maintain compliance through the full\\ncourse of prescribed radiotherapy. Per Dr. Martinez, continued ultrasound guidance and therapeutic radiology\\nsimulation-aided field setting verification per fraction is required for field placement, measurement of tumor\\ndepth, tissues evaluation, progress, acute effect monitoring, and determination for therapeutic treatment\\ndelivery is appropriate\\n
Detail Level: Zone
details…

## 2024-05-29 NOTE — PROCEDURE: SUPERFICIAL RADIATION TREATMENT
Fax from [email or web address] sending request for medical records scanned in media    Please Choose The Type Of Visit (Required): Treatment Visit: Show Treatment Variables

## 2024-08-28 ENCOUNTER — APPOINTMENT (OUTPATIENT)
Dept: URBAN - METROPOLITAN AREA CLINIC 301 | Age: 79
Setting detail: DERMATOLOGY
End: 2024-08-29

## 2024-08-28 PROBLEM — C44.311 BASAL CELL CARCINOMA OF SKIN OF NOSE: Status: ACTIVE | Noted: 2024-08-28

## 2024-08-28 PROCEDURE — 77280 THER RAD SIMULAJ FIELD SMPL: CPT

## 2024-08-28 PROCEDURE — 77334 RADIATION TREATMENT AID(S): CPT

## 2024-08-28 PROCEDURE — OTHER IMAGE GUIDED - SUPERFICIAL RADIOTHERAPY: OTHER

## 2024-08-28 PROCEDURE — 77261 THER RADIOLOGY TX PLNG SMPL: CPT

## 2024-08-28 PROCEDURE — G6001 ECHO GUIDANCE RADIOTHERAPY: HCPCS

## 2024-08-28 PROCEDURE — 77300 RADIATION THERAPY DOSE PLAN: CPT

## 2024-08-28 PROCEDURE — OTHER IMAGE GUIDED - SUPERFICIAL RADIOTHERAPY: SIMULATION VISIT: OTHER

## 2024-08-28 NOTE — PROCEDURE: IMAGE GUIDED - SUPERFICIAL RADIOTHERAPY: SIMULATION VISIT
Ultrasound Not Used Text: Ultrasound was not performed today due to
Bill For Treatment Planning: Yes- (Simple Planning- 1 Site: [id number])
Simulation Documentation: Per the request of Dr. Martinez, the patient was seen today for Superficial Radiation Therapy planning requiring initial simulation in preparation for treatment of specific diseased sites. Simulation is necessary to determine the correct patient and treatment portal positioning, to deliver safe and effective Radiotherapy. A high-frequency ultrasound image was acquired prior to treatment today for two- dimensional evaluation of tumor volume and will be repeated per fraction for response to treatment, in addition, to geometric accuracy of field placement. Physician evaluation of the ultrasound tumor depth, width, and breadth will be ongoing through the course of treatment and is deemed medically necessary ensuring efficacy of treatment and determine whether to proceed with delivery of therapeutic. Today’s image and setup were evaluated to determine the initiation of treatment. All appropriate blocking and treatment parameters were verified by the radiation therapist and provider.\\n\\nPer Dr. Martinez, continued per fraction ultrasound guidance and simulation are required for field placement, measurement of tumor depth, width and breadth, progress, and edema monitoring.\\n\\nPer the request of Dr. Martinez, continuing medical physics review as per radiotherapy standard of care post every 5th fraction for the patient, including assessment of treatment parameters,  of dose delivery, and review of patient treatment documentation in support of the provider, is ordered, ensuring efficacy and continued safe delivery of radiotherapy. Included in the physics check is a review of patient setup information, all pertinent simulation and treatment photograph(s) checks, prescription, dose calculation verification, per fraction dose charted correctly, elapsed days and treatment days correctly charted, cumulative dose correct, and review of any prescription changes. Continued medical physics review post every 5th fraction of radiotherapy is requested by the provider for appropriate radiotherapy management and is deemed medically necessary and standard of care.
Bill For Simulation: Yes- (Simple- 1 Site: [id number])
Was Ultrasound Performed Today?: Yes
Patient Positioning: Sitting
Ultrasound Used Text: Ultrasound depth is 1.99 mm.
Additional Comments (Add Customization Of Note Here): Patient was consulted for a non-melanoma skin cancer on the Nasal Tip. Patient was given treatment options including Excision, Mohs surgery, and IG-SRT. The patient refused both surgical options due to size and location of lesion and would like to proceed with Image Guided Superficial Radiation Therapy Treatments at this time. She has has IGSRT previously. Risks and Benefits explained to patient. The patient agrees to start radiotherapy.

## 2024-08-28 NOTE — PROCEDURE: IMAGE GUIDED - SUPERFICIAL RADIOTHERAPY
Body Location Override (Optional): NASAL TIP
Detail Level: Detailed
Pathology Override (Pathology Will Render As Diagnosis Name If Left Blank): Infiltrative BCC
Field Number: 4
Lesion Dimensions-X Axis In Cm: 0.3
Lesion Margin Size In Cm: 1
Shield Size In Cm: 2.2
Applicator Size In Cm: 3.0 cm
Energy (Kv): 100
Treatment Time (Min): 0.42
Daily Dose (Cgy): 274.68
Number Of Fractions For Prescription 1: 20
Treatments Per Week: 3
Total Dose For Prescription 1 (Cgy): 5768.28
Add A Second Prescription?: No
Additional Fraction(S) Needed:: 0
Bill For Physics Consultation: No - Render Text Only
Physics Documentation: (Physics Check Verbiage)

## 2024-09-11 ENCOUNTER — APPOINTMENT (OUTPATIENT)
Dept: URBAN - METROPOLITAN AREA CLINIC 301 | Age: 79
Setting detail: DERMATOLOGY
End: 2024-09-12

## 2024-09-11 PROBLEM — C44.311 BASAL CELL CARCINOMA OF SKIN OF NOSE: Status: ACTIVE | Noted: 2024-09-11

## 2024-09-11 PROCEDURE — 77401 RADIATION TX DELIVERY SUPFC: CPT

## 2024-09-11 PROCEDURE — OTHER IMAGE GUIDED - SUPERFICIAL RADIOTHERAPY: OTHER

## 2024-09-11 PROCEDURE — OTHER IMAGE GUIDED - SUPERFICIAL RADIOTHERAPY: TREATMENT VISIT: OTHER

## 2024-09-11 PROCEDURE — G6001 ECHO GUIDANCE RADIOTHERAPY: HCPCS | Mod: XU

## 2024-09-11 PROCEDURE — 77290 THER RAD SIMULAJ FIELD CPLX: CPT

## 2024-09-11 NOTE — PROCEDURE: IMAGE GUIDED - SUPERFICIAL RADIOTHERAPY: TREATMENT VISIT
Daily Dosage (Cgy): 274.68
Additional Change To Daily Dosage Administered Mid Treatment?: No
Bill For Ultrasound Evaluation (): Yes
Treatment Documentation: This patient has been treated today with image-guided superficial radiation therapy for non-melanoma skin \\ncancer. Written informed consent has been previously obtained from this patient for this treatment. This \\nconsent is documented in the patient's chart. The patient gave verbal consent to continue treatment today. \\nThe patient was treated with a specific radiation dose and setup as prescribed by the provider listed on this \\nvisit note. A Radiation Therapist performed administration of radiation under the supervision of a provider. \\nThe treatment parameters and cumulative dose are indicated above. Prior to administering the radiation, the \\npatient underwent a verification therapeutic radiology simulation-aided field setting defining relevant normal \\nand abnormal target anatomy and acquiring images with separate and distinct diagnostic high-frequency \\nultrasound to delineate tissues and determine whether to proceed with delivery of therapeutic, in addition \\nto retrieve data necessary to develop an optimal radiation treatment process for the patient. The field \\nplacement simulation documents any change seen in overall tumor volume documented in the patient’s \\nrecord, allows the clinician to indicate any needed changes in the treatment plan and/or prescription, \\nprovides diagnostic evaluation as the basis for performing the therapeutic procedure, and clearly identifies \\nthe information needed to decide to proceed with the therapeutic procedure. This process includes\\nverification of the treatment port(s) and proper treatment positioning. All treatment ports were \\nphotographed within electronic medical records. The patient's lead blocking along with gross tumor volume \\nand margin was confirmed. Considering superficial radiotherapy is clinical in setup, this requires the physician \\nand radiation therapist to clarify the location interest being treated against initial images, ultrasound, \\npathology, and patient anatomy. Care was taken to ensure pedersen treated were geometrically accurate and \\nproperly positioned using therapeutic radiology simulation-aided field setting verification per fraction. This \\nprocess is also utilized to determine if any prescription or setup changes are necessary. These steps are \\ntherefore medically necessary to ensure safe and effective administration of radiation. Ongoing therapeutic \\nradiology simulation-aided field setting verification is ordered throughout the course of therapy.\\nA high-frequency ultrasound image was acquired today for a two-dimensional evaluation of the tumor \\nvolume, depth, width, breadth, review of prior response to treatment, provide geometric accuracy of field \\nplacement, and determine whether to proceed with therapeutic delivery. \\nThe field placement and ultrasound imaging, per fraction, is separate and distinct from the initial simulation \\nand is an important task in providing safe administration of superficial radiation therapy. Physician evaluation \\nof the ultrasound information will be ongoing throughout the course of treatment and is deemed medically \\nnecessary to ensure the efficacy of treatment, whether to proceed with therapeutic delivery, and determine \\aviva necessary changes. Today's images were evaluated for determination of continuation of treatment with \\nthe current plan or with necessary changes as appropriate. Additionally, the use of ultrasound visualization \\nand targeted assessment allows the patient to be able to see their cancer(s) progress, encouraging the patient \\nto complete and maintain compliance through the full course of prescribed radiotherapy. Per Dr. Martinez , continued \\nultrasound guidance and therapeutic radiology simulation-aided field setting verification per fraction is \\nrequired for field placement, measurement of tumor depth, tissue evaluation, progress, acute effect \\nmonitoring, and determination for therapeutic treatment delivery is appropriate.
Add X Modifier?: GONSALES - Unusual Non-Overlapping Service
Prescription Used: 1
Calculate Total Cumulative Dose Automatically Or Manually: Manually
Ultrasound Used Text: High frequency ultrasound depth is 1.38 mm, which is 0.61 mm in difference from previous imaging.
Ultrasound Not Used Text: Ultrasound was not performed today due to
Bill For Simulation (Per Medicare, Typical Course Of Radiation Therapy Will Require Between One To Three Simulations): Yes- (Complex or 3+ Sites: [id number])
Energy (Kv): 100

## 2024-09-12 ENCOUNTER — APPOINTMENT (OUTPATIENT)
Dept: URBAN - METROPOLITAN AREA CLINIC 301 | Age: 79
Setting detail: DERMATOLOGY
End: 2024-09-12

## 2024-09-12 PROBLEM — C44.311 BASAL CELL CARCINOMA OF SKIN OF NOSE: Status: ACTIVE | Noted: 2024-09-12

## 2024-09-12 PROCEDURE — G6001 ECHO GUIDANCE RADIOTHERAPY: HCPCS | Mod: XU

## 2024-09-12 PROCEDURE — OTHER IMAGE GUIDED - SUPERFICIAL RADIOTHERAPY: TREATMENT VISIT: OTHER

## 2024-09-12 PROCEDURE — OTHER IMAGE GUIDED - SUPERFICIAL RADIOTHERAPY: OTHER

## 2024-09-12 PROCEDURE — 77401 RADIATION TX DELIVERY SUPFC: CPT

## 2024-09-12 PROCEDURE — 77290 THER RAD SIMULAJ FIELD CPLX: CPT

## 2024-09-12 NOTE — PROCEDURE: IMAGE GUIDED - SUPERFICIAL RADIOTHERAPY: TREATMENT VISIT
Daily Dosage (Cgy): 274.68
Additional Change To Daily Dosage Administered Mid Treatment?: No
Bill For Ultrasound Evaluation (): Yes
Treatment Documentation: This patient has been treated today with image-guided superficial radiation therapy for non-melanoma skin \\ncancer. Written informed consent has been previously obtained from this patient for this treatment. This \\nconsent is documented in the patient's chart. The patient gave verbal consent to continue treatment today. \\nThe patient was treated with a specific radiation dose and setup as prescribed by the provider listed on this \\nvisit note. A Radiation Therapist performed administration of radiation under the supervision of a provider. \\nThe treatment parameters and cumulative dose are indicated above. Prior to administering the radiation, the \\npatient underwent a verification therapeutic radiology simulation-aided field setting defining relevant normal \\nand abnormal target anatomy and acquiring images with separate and distinct diagnostic high-frequency \\nultrasound to delineate tissues and determine whether to proceed with delivery of therapeutic, in addition \\nto retrieve data necessary to develop an optimal radiation treatment process for the patient. The field \\nplacement simulation documents any change seen in overall tumor volume documented in the patient’s \\nrecord, allows the clinician to indicate any needed changes in the treatment plan and/or prescription, \\nprovides diagnostic evaluation as the basis for performing the therapeutic procedure, and clearly identifies \\nthe information needed to decide to proceed with the therapeutic procedure. This process includes\\nverification of the treatment port(s) and proper treatment positioning. All treatment ports were \\nphotographed within electronic medical records. The patient's lead blocking along with gross tumor volume \\nand margin was confirmed. Considering superficial radiotherapy is clinical in setup, this requires the physician \\nand radiation therapist to clarify the location interest being treated against initial images, ultrasound, \\npathology, and patient anatomy. Care was taken to ensure pedersen treated were geometrically accurate and \\nproperly positioned using therapeutic radiology simulation-aided field setting verification per fraction. This \\nprocess is also utilized to determine if any prescription or setup changes are necessary. These steps are \\ntherefore medically necessary to ensure safe and effective administration of radiation. Ongoing therapeutic \\nradiology simulation-aided field setting verification is ordered throughout the course of therapy.\\nA high-frequency ultrasound image was acquired today for a two-dimensional evaluation of the tumor \\nvolume, depth, width, breadth, review of prior response to treatment, provide geometric accuracy of field \\nplacement, and determine whether to proceed with therapeutic delivery. \\nThe field placement and ultrasound imaging, per fraction, is separate and distinct from the initial simulation \\nand is an important task in providing safe administration of superficial radiation therapy. Physician evaluation \\nof the ultrasound information will be ongoing throughout the course of treatment and is deemed medically \\nnecessary to ensure the efficacy of treatment, whether to proceed with therapeutic delivery, and determine \\aviva necessary changes. Today's images were evaluated for determination of continuation of treatment with \\nthe current plan or with necessary changes as appropriate. Additionally, the use of ultrasound visualization \\nand targeted assessment allows the patient to be able to see their cancer(s) progress, encouraging the patient \\nto complete and maintain compliance through the full course of prescribed radiotherapy. Per Dr. Martienz , continued \\nultrasound guidance and therapeutic radiology simulation-aided field setting verification per fraction is \\nrequired for field placement, measurement of tumor depth, tissue evaluation, progress, acute effect \\nmonitoring, and determination for therapeutic treatment delivery is appropriate.
Total Cumulative Dose (Cgy): 549.36
Additional Comments (Add Customization Of Note Here): Patient tolerated first treatment well. Patient reminded of skincare instructions to follow throughout course of treatment. Patient verbalized understanding.
Add X Modifier?: GONSALES - Unusual Non-Overlapping Service
Prescription Used: 1
Calculate Total Cumulative Dose Automatically Or Manually: Manually
Ultrasound Used Text: High frequency ultrasound depth is 1.23 mm, which is 0.15 mm in difference from previous imaging.
Fraction Number: 2
Ultrasound Not Used Text: Ultrasound was not performed today due to
Bill For Simulation (Per Medicare, Typical Course Of Radiation Therapy Will Require Between One To Three Simulations): Yes- (Complex or 3+ Sites: [id number])
Energy (Kv): 100

## 2024-09-17 ENCOUNTER — APPOINTMENT (OUTPATIENT)
Dept: URBAN - METROPOLITAN AREA CLINIC 301 | Age: 79
Setting detail: DERMATOLOGY
End: 2024-09-17

## 2024-09-17 PROBLEM — C44.311 BASAL CELL CARCINOMA OF SKIN OF NOSE: Status: ACTIVE | Noted: 2024-09-17

## 2024-09-17 PROCEDURE — 77401 RADIATION TX DELIVERY SUPFC: CPT

## 2024-09-17 PROCEDURE — 77280 THER RAD SIMULAJ FIELD SMPL: CPT

## 2024-09-17 PROCEDURE — G6001 ECHO GUIDANCE RADIOTHERAPY: HCPCS | Mod: XU

## 2024-09-17 PROCEDURE — OTHER IMAGE GUIDED - SUPERFICIAL RADIOTHERAPY: TREATMENT VISIT: OTHER

## 2024-09-17 PROCEDURE — OTHER IMAGE GUIDED - SUPERFICIAL RADIOTHERAPY: OTHER

## 2024-09-17 NOTE — PROCEDURE: IMAGE GUIDED - SUPERFICIAL RADIOTHERAPY: TREATMENT VISIT
Daily Dosage (Cgy): 274.68
Additional Change To Daily Dosage Administered Mid Treatment?: No
Bill For Ultrasound Evaluation (): Yes
Treatment Documentation: This patient has been treated today with image-guided superficial radiation therapy for non-melanoma skin \\ncancer. Written informed consent has been previously obtained from this patient for this treatment. This \\nconsent is documented in the patient's chart. The patient gave verbal consent to continue treatment today. \\nThe patient was treated with a specific radiation dose and setup as prescribed by the provider listed on this \\nvisit note. A Radiation Therapist performed administration of radiation under the supervision of a provider. \\nThe treatment parameters and cumulative dose are indicated above. Prior to administering the radiation, the \\npatient underwent a verification therapeutic radiology simulation-aided field setting defining relevant normal \\nand abnormal target anatomy and acquiring images with separate and distinct diagnostic high-frequency \\nultrasound to delineate tissues and determine whether to proceed with delivery of therapeutic, in addition \\nto retrieve data necessary to develop an optimal radiation treatment process for the patient. The field \\nplacement simulation documents any change seen in overall tumor volume documented in the patient’s \\nrecord, allows the clinician to indicate any needed changes in the treatment plan and/or prescription, \\nprovides diagnostic evaluation as the basis for performing the therapeutic procedure, and clearly identifies \\nthe information needed to decide to proceed with the therapeutic procedure. This process includes\\nverification of the treatment port(s) and proper treatment positioning. All treatment ports were \\nphotographed within electronic medical records. The patient's lead blocking along with gross tumor volume \\nand margin was confirmed. Considering superficial radiotherapy is clinical in setup, this requires the physician \\nand radiation therapist to clarify the location interest being treated against initial images, ultrasound, \\npathology, and patient anatomy. Care was taken to ensure pedersen treated were geometrically accurate and \\nproperly positioned using therapeutic radiology simulation-aided field setting verification per fraction. This \\nprocess is also utilized to determine if any prescription or setup changes are necessary. These steps are \\ntherefore medically necessary to ensure safe and effective administration of radiation. Ongoing therapeutic \\nradiology simulation-aided field setting verification is ordered throughout the course of therapy.\\nA high-frequency ultrasound image was acquired today for a two-dimensional evaluation of the tumor \\nvolume, depth, width, breadth, review of prior response to treatment, provide geometric accuracy of field \\nplacement, and determine whether to proceed with therapeutic delivery. \\nThe field placement and ultrasound imaging, per fraction, is separate and distinct from the initial simulation \\nand is an important task in providing safe administration of superficial radiation therapy. Physician evaluation \\nof the ultrasound information will be ongoing throughout the course of treatment and is deemed medically \\nnecessary to ensure the efficacy of treatment, whether to proceed with therapeutic delivery, and determine \\aviva necessary changes. Today's images were evaluated for determination of continuation of treatment with \\nthe current plan or with necessary changes as appropriate. Additionally, the use of ultrasound visualization \\nand targeted assessment allows the patient to be able to see their cancer(s) progress, encouraging the patient \\nto complete and maintain compliance through the full course of prescribed radiotherapy. Per Dr. Martinez , continued \\nultrasound guidance and therapeutic radiology simulation-aided field setting verification per fraction is \\nrequired for field placement, measurement of tumor depth, tissue evaluation, progress, acute effect \\nmonitoring, and determination for therapeutic treatment delivery is appropriate.
Total Cumulative Dose (Cgy): 824.04
Additional Comments (Add Customization Of Note Here): No changes early in this treatment.
Add X Modifier?: GONSALES - Unusual Non-Overlapping Service
Prescription Used: 1
Calculate Total Cumulative Dose Automatically Or Manually: Manually
Ultrasound Used Text: High frequency ultrasound depth is1.89 mm, which is 0.66 mm in difference from previous imaging.
Fraction Number: 3
Ultrasound Not Used Text: Ultrasound was not performed today due to
Bill For Simulation (Per Medicare, Typical Course Of Radiation Therapy Will Require Between One To Three Simulations): Yes- (Simple- 1 Site: [id number])
Energy (Kv): 100

## 2024-09-18 ENCOUNTER — APPOINTMENT (OUTPATIENT)
Dept: URBAN - METROPOLITAN AREA CLINIC 301 | Age: 79
Setting detail: DERMATOLOGY
End: 2024-09-23

## 2024-09-18 PROBLEM — C44.311 BASAL CELL CARCINOMA OF SKIN OF NOSE: Status: ACTIVE | Noted: 2024-09-18

## 2024-09-18 PROCEDURE — 77401 RADIATION TX DELIVERY SUPFC: CPT

## 2024-09-18 PROCEDURE — OTHER IMAGE GUIDED - SUPERFICIAL RADIOTHERAPY: OTHER

## 2024-09-18 PROCEDURE — OTHER IMAGE GUIDED - SUPERFICIAL RADIOTHERAPY: TREATMENT VISIT: OTHER

## 2024-09-18 PROCEDURE — G6001 ECHO GUIDANCE RADIOTHERAPY: HCPCS | Mod: XU

## 2024-09-18 PROCEDURE — 77280 THER RAD SIMULAJ FIELD SMPL: CPT

## 2024-09-18 NOTE — PROCEDURE: IMAGE GUIDED - SUPERFICIAL RADIOTHERAPY: TREATMENT VISIT
Daily Dosage (Cgy): 274.68
Additional Change To Daily Dosage Administered Mid Treatment?: No
Bill For Ultrasound Evaluation (): Yes
Treatment Documentation: This patient has been treated today with image-guided superficial radiation therapy for non-melanoma skin \\ncancer. Written informed consent has been previously obtained from this patient for this treatment. This \\nconsent is documented in the patient's chart. The patient gave verbal consent to continue treatment today. \\nThe patient was treated with a specific radiation dose and setup as prescribed by the provider listed on this \\nvisit note. A Radiation Therapist performed administration of radiation under the supervision of a provider. \\nThe treatment parameters and cumulative dose are indicated above. Prior to administering the radiation, the \\npatient underwent a verification therapeutic radiology simulation-aided field setting defining relevant normal \\nand abnormal target anatomy and acquiring images with separate and distinct diagnostic high-frequency \\nultrasound to delineate tissues and determine whether to proceed with delivery of therapeutic, in addition \\nto retrieve data necessary to develop an optimal radiation treatment process for the patient. The field \\nplacement simulation documents any change seen in overall tumor volume documented in the patient’s \\nrecord, allows the clinician to indicate any needed changes in the treatment plan and/or prescription, \\nprovides diagnostic evaluation as the basis for performing the therapeutic procedure, and clearly identifies \\nthe information needed to decide to proceed with the therapeutic procedure. This process includes\\nverification of the treatment port(s) and proper treatment positioning. All treatment ports were \\nphotographed within electronic medical records. The patient's lead blocking along with gross tumor volume \\nand margin was confirmed. Considering superficial radiotherapy is clinical in setup, this requires the physician \\nand radiation therapist to clarify the location interest being treated against initial images, ultrasound, \\npathology, and patient anatomy. Care was taken to ensure pedersen treated were geometrically accurate and \\nproperly positioned using therapeutic radiology simulation-aided field setting verification per fraction. This \\nprocess is also utilized to determine if any prescription or setup changes are necessary. These steps are \\ntherefore medically necessary to ensure safe and effective administration of radiation. Ongoing therapeutic \\nradiology simulation-aided field setting verification is ordered throughout the course of therapy.\\nA high-frequency ultrasound image was acquired today for a two-dimensional evaluation of the tumor \\nvolume, depth, width, breadth, review of prior response to treatment, provide geometric accuracy of field \\nplacement, and determine whether to proceed with therapeutic delivery. \\nThe field placement and ultrasound imaging, per fraction, is separate and distinct from the initial simulation \\nand is an important task in providing safe administration of superficial radiation therapy. Physician evaluation \\nof the ultrasound information will be ongoing throughout the course of treatment and is deemed medically \\nnecessary to ensure the efficacy of treatment, whether to proceed with therapeutic delivery, and determine \\aviva necessary changes. Today's images were evaluated for determination of continuation of treatment with \\nthe current plan or with necessary changes as appropriate. Additionally, the use of ultrasound visualization \\nand targeted assessment allows the patient to be able to see their cancer(s) progress, encouraging the patient \\nto complete and maintain compliance through the full course of prescribed radiotherapy. Per Dr. Martinez , continued \\nultrasound guidance and therapeutic radiology simulation-aided field setting verification per fraction is \\nrequired for field placement, measurement of tumor depth, tissue evaluation, progress, acute effect \\nmonitoring, and determination for therapeutic treatment delivery is appropriate.
Total Cumulative Dose (Cgy): 1098.72
Additional Comments (Add Customization Of Note Here): No changes in skin appearance this early in treatment.
Add X Modifier?: GONSALES - Unusual Non-Overlapping Service
Prescription Used: 1
Calculate Total Cumulative Dose Automatically Or Manually: Manually
Ultrasound Used Text: High frequency ultrasound depth is 1.49 mm, which is 0.40 mm in difference from previous imaging.
Fraction Number: 4
Ultrasound Not Used Text: Ultrasound was not performed today due to
Bill For Simulation (Per Medicare, Typical Course Of Radiation Therapy Will Require Between One To Three Simulations): Yes- (Simple- 1 Site: 29926)
Energy (Kv): 100

## 2024-09-19 ENCOUNTER — APPOINTMENT (OUTPATIENT)
Dept: URBAN - METROPOLITAN AREA CLINIC 301 | Age: 79
Setting detail: DERMATOLOGY
End: 2024-09-20

## 2024-09-19 PROBLEM — C44.311 BASAL CELL CARCINOMA OF SKIN OF NOSE: Status: ACTIVE | Noted: 2024-09-19

## 2024-09-19 PROCEDURE — 77401 RADIATION TX DELIVERY SUPFC: CPT

## 2024-09-19 PROCEDURE — OTHER IMAGE GUIDED - SUPERFICIAL RADIOTHERAPY: EVALUATION VISIT: OTHER

## 2024-09-19 PROCEDURE — OTHER IMAGE GUIDED - SUPERFICIAL RADIOTHERAPY: TREATMENT VISIT: OTHER

## 2024-09-19 PROCEDURE — G6001 ECHO GUIDANCE RADIOTHERAPY: HCPCS | Mod: XU

## 2024-09-19 PROCEDURE — OTHER IMAGE GUIDED - SUPERFICIAL RADIOTHERAPY: OTHER

## 2024-09-19 PROCEDURE — 77280 THER RAD SIMULAJ FIELD SMPL: CPT

## 2024-09-19 NOTE — PROCEDURE: IMAGE GUIDED - SUPERFICIAL RADIOTHERAPY: TREATMENT VISIT
Daily Dosage (Cgy): 274.68
Additional Change To Daily Dosage Administered Mid Treatment?: No
Bill For Ultrasound Evaluation (): Yes
Treatment Documentation: This patient has been treated today with image-guided superficial radiation therapy for non-melanoma skin \\ncancer. Written informed consent has been previously obtained from this patient for this treatment. This \\nconsent is documented in the patient's chart. The patient gave verbal consent to continue treatment today. \\nThe patient was treated with a specific radiation dose and setup as prescribed by the provider listed on this \\nvisit note. A Radiation Therapist performed administration of radiation under the supervision of a provider. \\nThe treatment parameters and cumulative dose are indicated above. Prior to administering the radiation, the \\npatient underwent a verification therapeutic radiology simulation-aided field setting defining relevant normal \\nand abnormal target anatomy and acquiring images with separate and distinct diagnostic high-frequency \\nultrasound to delineate tissues and determine whether to proceed with delivery of therapeutic, in addition \\nto retrieve data necessary to develop an optimal radiation treatment process for the patient. The field \\nplacement simulation documents any change seen in overall tumor volume documented in the patient’s \\nrecord, allows the clinician to indicate any needed changes in the treatment plan and/or prescription, \\nprovides diagnostic evaluation as the basis for performing the therapeutic procedure, and clearly identifies \\nthe information needed to decide to proceed with the therapeutic procedure. This process includes\\nverification of the treatment port(s) and proper treatment positioning. All treatment ports were \\nphotographed within electronic medical records. The patient's lead blocking along with gross tumor volume \\nand margin was confirmed. Considering superficial radiotherapy is clinical in setup, this requires the physician \\nand radiation therapist to clarify the location interest being treated against initial images, ultrasound, \\npathology, and patient anatomy. Care was taken to ensure pedersen treated were geometrically accurate and \\nproperly positioned using therapeutic radiology simulation-aided field setting verification per fraction. This \\nprocess is also utilized to determine if any prescription or setup changes are necessary. These steps are \\ntherefore medically necessary to ensure safe and effective administration of radiation. Ongoing therapeutic \\nradiology simulation-aided field setting verification is ordered throughout the course of therapy.\\nA high-frequency ultrasound image was acquired today for a two-dimensional evaluation of the tumor \\nvolume, depth, width, breadth, review of prior response to treatment, provide geometric accuracy of field \\nplacement, and determine whether to proceed with therapeutic delivery. \\nThe field placement and ultrasound imaging, per fraction, is separate and distinct from the initial simulation \\nand is an important task in providing safe administration of superficial radiation therapy. Physician evaluation \\nof the ultrasound information will be ongoing throughout the course of treatment and is deemed medically \\nnecessary to ensure the efficacy of treatment, whether to proceed with therapeutic delivery, and determine \\aviva necessary changes. Today's images were evaluated for determination of continuation of treatment with \\nthe current plan or with necessary changes as appropriate. Additionally, the use of ultrasound visualization \\nand targeted assessment allows the patient to be able to see their cancer(s) progress, encouraging the patient \\nto complete and maintain compliance through the full course of prescribed radiotherapy. Per Dr. Martinez , continued \\nultrasound guidance and therapeutic radiology simulation-aided field setting verification per fraction is \\nrequired for field placement, measurement of tumor depth, tissue evaluation, progress, acute effect \\nmonitoring, and determination for therapeutic treatment delivery is appropriate.
Total Cumulative Dose (Cgy): 1373.40
Additional Comments (Add Customization Of Note Here): No visible changes at this time. Patient had no questions or concerns.
Add X Modifier?: GONSALES - Unusual Non-Overlapping Service
Prescription Used: 1
Calculate Total Cumulative Dose Automatically Or Manually: Manually
Ultrasound Used Text: High frequency ultrasound depth is 1.14 mm, which is 0.35 mm in difference from previous imaging.
Fraction Number: 5
Ultrasound Not Used Text: Ultrasound was not performed today due to
Bill For Simulation (Per Medicare, Typical Course Of Radiation Therapy Will Require Between One To Three Simulations): Yes- (Simple- 1 Site: 94646)
Energy (Kv): 100

## 2024-09-19 NOTE — PROCEDURE: IMAGE GUIDED - SUPERFICIAL RADIOTHERAPY: EVALUATION VISIT
Assessment: Appropriate reaction
Radiation Therapy Oncology Group (Rtog) Score: 0
Show Ultrasound In Note?: Yes
Ultrasound Not Used Text: Ultrasound was not performed today due to
Evaluation Plan: The patient is undergoing superficial radiation therapy for skin cancer and presents for weekly evaluation and \\nmanagement. Per protocol and as documented on the flow sheet, the patient was questioned as to subjective \\nredness, pruritus, pain, drainage, fatigue, or any other symptoms. Objectively, the radiation area was evaluated \\nwith regards to erythema, atrophy, scale, crusting, erosion, ulceration, edema, purpura, tenderness, warmth, \\ndrainage, and any other findings. The plan was extensively reviewed including dose and dosing schedule. The \\nsimulation and clinical setup were also reviewed as were external and any internal shields and based on this review \\nthe appropriateness and sufficiency of treatment was determined.
Bill For Evaluation (29074)?: No
Is This Visit For Evaluation During Treatment Or Follow Up Post Treatment?: evaluation
Ultrasound Used Text: Ultrasound depth is 1.14 mm.

## 2024-09-21 ENCOUNTER — APPOINTMENT (OUTPATIENT)
Dept: URBAN - METROPOLITAN AREA CLINIC 301 | Age: 79
Setting detail: DERMATOLOGY
End: 2024-09-21

## 2024-09-21 PROBLEM — C44.311 BASAL CELL CARCINOMA OF SKIN OF NOSE: Status: ACTIVE | Noted: 2024-09-21

## 2024-09-21 PROCEDURE — 77336 RADIATION PHYSICS CONSULT: CPT

## 2024-09-21 PROCEDURE — OTHER IMAGE GUIDED - SUPERFICIAL RADIOTHERAPY: OTHER

## 2024-09-21 NOTE — PROCEDURE: IMAGE GUIDED - SUPERFICIAL RADIOTHERAPY
Body Location Override (Optional): NASAL TIP
Detail Level: Detailed
Pathology Override (Pathology Will Render As Diagnosis Name If Left Blank): Infiltrative BCC
Field Number: 4
Lesion Dimensions-X Axis In Cm: 0.3
Lesion Margin Size In Cm: 1
Shield Size In Cm: 2.2
Applicator Size In Cm: 3.0 cm
Energy (Kv): 100
Treatment Time (Min): 0.42
Daily Dose (Cgy): 274.68
Number Of Fractions For Prescription 1: 21
Treatments Per Week: 3
Total Dose For Prescription 1 (Cgy): 5768.28
Add A Second Prescription?: No
Additional Fraction(S) Needed:: 0
Add Physics Consultation: Yes
Physics Consultation Performed For Fractions:: 1-5
Physics Documentation: Per the request of Dr. Martinez, continuing medical physics review as per radiotherapy standard of care post every 5th fraction for patient, including assessment of treatment parameters,  of dose delivery, and review of patient treatment documentation in support of the provider, to ensure efficacy and continued safe delivery of radiotherapy. Included in physics check is review of patient setup information, all pertinent simulation and treatment photographs checks, prescription, dose calculation verification, per fraction dose charted correctly, elapsed days and treatment days correctly charted, cumulative dose correct, and review of any prescription changes. Patient was not present, nor was it necessary for the patient to be present for weekly physics review and no other superficial radiotherapy services were rendered on this day. Continued medical physics review post every 5th fraction of therapy is requested by provider for appropriate radiotherapy management and is deemed medically necessary and standard of care.

## 2024-09-25 ENCOUNTER — APPOINTMENT (OUTPATIENT)
Dept: URBAN - METROPOLITAN AREA CLINIC 301 | Age: 79
Setting detail: DERMATOLOGY
End: 2024-09-27

## 2024-09-25 PROBLEM — C44.311 BASAL CELL CARCINOMA OF SKIN OF NOSE: Status: ACTIVE | Noted: 2024-09-25

## 2024-09-25 PROCEDURE — 77401 RADIATION TX DELIVERY SUPFC: CPT

## 2024-09-25 PROCEDURE — 77280 THER RAD SIMULAJ FIELD SMPL: CPT

## 2024-09-25 PROCEDURE — OTHER IMAGE GUIDED - SUPERFICIAL RADIOTHERAPY: TREATMENT VISIT: OTHER

## 2024-09-25 PROCEDURE — OTHER IMAGE GUIDED - SUPERFICIAL RADIOTHERAPY: OTHER

## 2024-09-25 PROCEDURE — G6001 ECHO GUIDANCE RADIOTHERAPY: HCPCS | Mod: XU

## 2024-09-25 NOTE — PROCEDURE: IMAGE GUIDED - SUPERFICIAL RADIOTHERAPY: TREATMENT VISIT
Daily Dosage (Cgy): 274.68
Additional Change To Daily Dosage Administered Mid Treatment?: No
Bill For Ultrasound Evaluation (): Yes
Treatment Documentation: This patient has been treated today with image-guided superficial radiation therapy for non-melanoma skin \\ncancer. Written informed consent has been previously obtained from this patient for this treatment. This \\nconsent is documented in the patient's chart. The patient gave verbal consent to continue treatment today. \\nThe patient was treated with a specific radiation dose and setup as prescribed by the provider listed on this \\nvisit note. A Radiation Therapist performed administration of radiation under the supervision of a provider. \\nThe treatment parameters and cumulative dose are indicated above. Prior to administering the radiation, the \\npatient underwent a verification therapeutic radiology simulation-aided field setting defining relevant normal \\nand abnormal target anatomy and acquiring images with separate and distinct diagnostic high-frequency \\nultrasound to delineate tissues and determine whether to proceed with delivery of therapeutic, in addition \\nto retrieve data necessary to develop an optimal radiation treatment process for the patient. The field \\nplacement simulation documents any change seen in overall tumor volume documented in the patient’s \\nrecord, allows the clinician to indicate any needed changes in the treatment plan and/or prescription, \\nprovides diagnostic evaluation as the basis for performing the therapeutic procedure, and clearly identifies \\nthe information needed to decide to proceed with the therapeutic procedure. This process includes\\nverification of the treatment port(s) and proper treatment positioning. All treatment ports were \\nphotographed within electronic medical records. The patient's lead blocking along with gross tumor volume \\nand margin was confirmed. Considering superficial radiotherapy is clinical in setup, this requires the physician \\nand radiation therapist to clarify the location interest being treated against initial images, ultrasound, \\npathology, and patient anatomy. Care was taken to ensure pedersen treated were geometrically accurate and \\nproperly positioned using therapeutic radiology simulation-aided field setting verification per fraction. This \\nprocess is also utilized to determine if any prescription or setup changes are necessary. These steps are \\ntherefore medically necessary to ensure safe and effective administration of radiation. Ongoing therapeutic \\nradiology simulation-aided field setting verification is ordered throughout the course of therapy.\\nA high-frequency ultrasound image was acquired today for a two-dimensional evaluation of the tumor \\nvolume, depth, width, breadth, review of prior response to treatment, provide geometric accuracy of field \\nplacement, and determine whether to proceed with therapeutic delivery. \\nThe field placement and ultrasound imaging, per fraction, is separate and distinct from the initial simulation \\nand is an important task in providing safe administration of superficial radiation therapy. Physician evaluation \\nof the ultrasound information will be ongoing throughout the course of treatment and is deemed medically \\nnecessary to ensure the efficacy of treatment, whether to proceed with therapeutic delivery, and determine \\aviva necessary changes. Today's images were evaluated for determination of continuation of treatment with \\nthe current plan or with necessary changes as appropriate. Additionally, the use of ultrasound visualization \\nand targeted assessment allows the patient to be able to see their cancer(s) progress, encouraging the patient \\nto complete and maintain compliance through the full course of prescribed radiotherapy. Per Dr. Martinez , continued \\nultrasound guidance and therapeutic radiology simulation-aided field setting verification per fraction is \\nrequired for field placement, measurement of tumor depth, tissue evaluation, progress, acute effect \\nmonitoring, and determination for therapeutic treatment delivery is appropriate.
Total Cumulative Dose (Cgy): 1648.08
Additional Comments (Add Customization Of Note Here): No changes from OTV day.
Add X Modifier?: GONSALES - Unusual Non-Overlapping Service
Prescription Used: 1
Calculate Total Cumulative Dose Automatically Or Manually: Manually
Ultrasound Used Text: High frequency ultrasound depth is 1.22 mm, which is 0.08 mm in difference from previous imaging.
Fraction Number: 6
Ultrasound Not Used Text: Ultrasound was not performed today due to
Bill For Simulation (Per Medicare, Typical Course Of Radiation Therapy Will Require Between One To Three Simulations): Yes- (Simple- 1 Site: 52045)
Energy (Kv): 100

## 2024-09-26 ENCOUNTER — APPOINTMENT (OUTPATIENT)
Dept: URBAN - METROPOLITAN AREA CLINIC 301 | Age: 79
Setting detail: DERMATOLOGY
End: 2024-09-26

## 2024-09-26 PROBLEM — C44.311 BASAL CELL CARCINOMA OF SKIN OF NOSE: Status: ACTIVE | Noted: 2024-09-26

## 2024-09-26 PROCEDURE — OTHER IMAGE GUIDED - SUPERFICIAL RADIOTHERAPY: TREATMENT VISIT: OTHER

## 2024-09-26 PROCEDURE — 77401 RADIATION TX DELIVERY SUPFC: CPT

## 2024-09-26 PROCEDURE — G6001 ECHO GUIDANCE RADIOTHERAPY: HCPCS | Mod: XU

## 2024-09-26 PROCEDURE — OTHER IMAGE GUIDED - SUPERFICIAL RADIOTHERAPY: OTHER

## 2024-09-26 PROCEDURE — 77280 THER RAD SIMULAJ FIELD SMPL: CPT

## 2024-09-26 NOTE — PROCEDURE: IMAGE GUIDED - SUPERFICIAL RADIOTHERAPY: TREATMENT VISIT
Daily Dosage (Cgy): 274.68
Additional Change To Daily Dosage Administered Mid Treatment?: No
Bill For Ultrasound Evaluation (): Yes
Treatment Documentation: This patient has been treated today with image-guided superficial radiation therapy for non-melanoma skin \\ncancer. Written informed consent has been previously obtained from this patient for this treatment. This \\nconsent is documented in the patient's chart. The patient gave verbal consent to continue treatment today. \\nThe patient was treated with a specific radiation dose and setup as prescribed by the provider listed on this \\nvisit note. A Radiation Therapist performed administration of radiation under the supervision of a provider. \\nThe treatment parameters and cumulative dose are indicated above. Prior to administering the radiation, the \\npatient underwent a verification therapeutic radiology simulation-aided field setting defining relevant normal \\nand abnormal target anatomy and acquiring images with separate and distinct diagnostic high-frequency \\nultrasound to delineate tissues and determine whether to proceed with delivery of therapeutic, in addition \\nto retrieve data necessary to develop an optimal radiation treatment process for the patient. The field \\nplacement simulation documents any change seen in overall tumor volume documented in the patient’s \\nrecord, allows the clinician to indicate any needed changes in the treatment plan and/or prescription, \\nprovides diagnostic evaluation as the basis for performing the therapeutic procedure, and clearly identifies \\nthe information needed to decide to proceed with the therapeutic procedure. This process includes\\nverification of the treatment port(s) and proper treatment positioning. All treatment ports were \\nphotographed within electronic medical records. The patient's lead blocking along with gross tumor volume \\nand margin was confirmed. Considering superficial radiotherapy is clinical in setup, this requires the physician \\nand radiation therapist to clarify the location interest being treated against initial images, ultrasound, \\npathology, and patient anatomy. Care was taken to ensure pedersen treated were geometrically accurate and \\nproperly positioned using therapeutic radiology simulation-aided field setting verification per fraction. This \\nprocess is also utilized to determine if any prescription or setup changes are necessary. These steps are \\ntherefore medically necessary to ensure safe and effective administration of radiation. Ongoing therapeutic \\nradiology simulation-aided field setting verification is ordered throughout the course of therapy.\\nA high-frequency ultrasound image was acquired today for a two-dimensional evaluation of the tumor \\nvolume, depth, width, breadth, review of prior response to treatment, provide geometric accuracy of field \\nplacement, and determine whether to proceed with therapeutic delivery. \\nThe field placement and ultrasound imaging, per fraction, is separate and distinct from the initial simulation \\nand is an important task in providing safe administration of superficial radiation therapy. Physician evaluation \\nof the ultrasound information will be ongoing throughout the course of treatment and is deemed medically \\nnecessary to ensure the efficacy of treatment, whether to proceed with therapeutic delivery, and determine \\aviva necessary changes. Today's images were evaluated for determination of continuation of treatment with \\nthe current plan or with necessary changes as appropriate. Additionally, the use of ultrasound visualization \\nand targeted assessment allows the patient to be able to see their cancer(s) progress, encouraging the patient \\nto complete and maintain compliance through the full course of prescribed radiotherapy. Per Dr. Martinez , continued \\nultrasound guidance and therapeutic radiology simulation-aided field setting verification per fraction is \\nrequired for field placement, measurement of tumor depth, tissue evaluation, progress, acute effect \\nmonitoring, and determination for therapeutic treatment delivery is appropriate.
Total Cumulative Dose (Cgy): 1922.76
Additional Comments (Add Customization Of Note Here): Mild Erythema present in the treatment area.
Add X Modifier?: GONSALES - Unusual Non-Overlapping Service
Prescription Used: 1
Calculate Total Cumulative Dose Automatically Or Manually: Manually
Ultrasound Used Text: High frequency ultrasound depth is 1.55 mm, which is 0.33 mm in difference from previous imaging.
Fraction Number: 7
Ultrasound Not Used Text: Ultrasound was not performed today due to
Bill For Simulation (Per Medicare, Typical Course Of Radiation Therapy Will Require Between One To Three Simulations): Yes- (Simple- 1 Site: 47280)
Energy (Kv): 100

## 2024-09-27 ENCOUNTER — APPOINTMENT (OUTPATIENT)
Dept: URBAN - METROPOLITAN AREA CLINIC 301 | Age: 79
Setting detail: DERMATOLOGY
End: 2024-09-29

## 2024-09-27 PROBLEM — C44.311 BASAL CELL CARCINOMA OF SKIN OF NOSE: Status: ACTIVE | Noted: 2024-09-27

## 2024-09-27 PROCEDURE — G6001 ECHO GUIDANCE RADIOTHERAPY: HCPCS | Mod: XU

## 2024-09-27 PROCEDURE — OTHER IMAGE GUIDED - SUPERFICIAL RADIOTHERAPY: TREATMENT VISIT: OTHER

## 2024-09-27 PROCEDURE — OTHER IMAGE GUIDED - SUPERFICIAL RADIOTHERAPY: OTHER

## 2024-09-27 PROCEDURE — 77401 RADIATION TX DELIVERY SUPFC: CPT

## 2024-09-27 PROCEDURE — 77280 THER RAD SIMULAJ FIELD SMPL: CPT

## 2024-09-27 NOTE — PROCEDURE: IMAGE GUIDED - SUPERFICIAL RADIOTHERAPY: TREATMENT VISIT
Daily Dosage (Cgy): 274.68
Additional Change To Daily Dosage Administered Mid Treatment?: No
Bill For Ultrasound Evaluation (): Yes
Treatment Documentation: This patient has been treated today with image-guided superficial radiation therapy for non-melanoma skin \\ncancer. Written informed consent has been previously obtained from this patient for this treatment. This \\nconsent is documented in the patient's chart. The patient gave verbal consent to continue treatment today. \\nThe patient was treated with a specific radiation dose and setup as prescribed by the provider listed on this \\nvisit note. A Radiation Therapist performed administration of radiation under the supervision of a provider. \\nThe treatment parameters and cumulative dose are indicated above. Prior to administering the radiation, the \\npatient underwent a verification therapeutic radiology simulation-aided field setting defining relevant normal \\nand abnormal target anatomy and acquiring images with separate and distinct diagnostic high-frequency \\nultrasound to delineate tissues and determine whether to proceed with delivery of therapeutic, in addition \\nto retrieve data necessary to develop an optimal radiation treatment process for the patient. The field \\nplacement simulation documents any change seen in overall tumor volume documented in the patient’s \\nrecord, allows the clinician to indicate any needed changes in the treatment plan and/or prescription, \\nprovides diagnostic evaluation as the basis for performing the therapeutic procedure, and clearly identifies \\nthe information needed to decide to proceed with the therapeutic procedure. This process includes\\nverification of the treatment port(s) and proper treatment positioning. All treatment ports were \\nphotographed within electronic medical records. The patient's lead blocking along with gross tumor volume \\nand margin was confirmed. Considering superficial radiotherapy is clinical in setup, this requires the physician \\nand radiation therapist to clarify the location interest being treated against initial images, ultrasound, \\npathology, and patient anatomy. Care was taken to ensure pedersen treated were geometrically accurate and \\nproperly positioned using therapeutic radiology simulation-aided field setting verification per fraction. This \\nprocess is also utilized to determine if any prescription or setup changes are necessary. These steps are \\ntherefore medically necessary to ensure safe and effective administration of radiation. Ongoing therapeutic \\nradiology simulation-aided field setting verification is ordered throughout the course of therapy.\\nA high-frequency ultrasound image was acquired today for a two-dimensional evaluation of the tumor \\nvolume, depth, width, breadth, review of prior response to treatment, provide geometric accuracy of field \\nplacement, and determine whether to proceed with therapeutic delivery. \\nThe field placement and ultrasound imaging, per fraction, is separate and distinct from the initial simulation \\nand is an important task in providing safe administration of superficial radiation therapy. Physician evaluation \\nof the ultrasound information will be ongoing throughout the course of treatment and is deemed medically \\nnecessary to ensure the efficacy of treatment, whether to proceed with therapeutic delivery, and determine \\aviva necessary changes. Today's images were evaluated for determination of continuation of treatment with \\nthe current plan or with necessary changes as appropriate. Additionally, the use of ultrasound visualization \\nand targeted assessment allows the patient to be able to see their cancer(s) progress, encouraging the patient \\nto complete and maintain compliance through the full course of prescribed radiotherapy. Per Dr. Martinez , continued \\nultrasound guidance and therapeutic radiology simulation-aided field setting verification per fraction is \\nrequired for field placement, measurement of tumor depth, tissue evaluation, progress, acute effect \\nmonitoring, and determination for therapeutic treatment delivery is appropriate.
Total Cumulative Dose (Cgy): 2197.44
Additional Comments (Add Customization Of Note Here): Mild Erythema present in the treatment area.
Add X Modifier?: GONSALES - Unusual Non-Overlapping Service
Prescription Used: 1
Calculate Total Cumulative Dose Automatically Or Manually: Manually
Ultrasound Used Text: High frequency ultrasound depth is 1.55 mm, which is 0.0 mm in difference from previous imaging.
Fraction Number: 8
Ultrasound Not Used Text: Ultrasound was not performed today due to
Bill For Simulation (Per Medicare, Typical Course Of Radiation Therapy Will Require Between One To Three Simulations): Yes- (Simple- 1 Site: 54903)
Energy (Kv): 100

## 2024-10-01 ENCOUNTER — APPOINTMENT (OUTPATIENT)
Dept: URBAN - METROPOLITAN AREA CLINIC 301 | Age: 79
Setting detail: DERMATOLOGY
End: 2024-10-03

## 2024-10-01 PROBLEM — C44.311 BASAL CELL CARCINOMA OF SKIN OF NOSE: Status: ACTIVE | Noted: 2024-10-01

## 2024-10-01 PROCEDURE — G6001 ECHO GUIDANCE RADIOTHERAPY: HCPCS | Mod: XU

## 2024-10-01 PROCEDURE — 77280 THER RAD SIMULAJ FIELD SMPL: CPT

## 2024-10-01 PROCEDURE — OTHER IMAGE GUIDED - SUPERFICIAL RADIOTHERAPY: TREATMENT VISIT: OTHER

## 2024-10-01 PROCEDURE — OTHER IMAGE GUIDED - SUPERFICIAL RADIOTHERAPY: OTHER

## 2024-10-01 PROCEDURE — 77401 RADIATION TX DELIVERY SUPFC: CPT

## 2024-10-01 NOTE — PROCEDURE: IMAGE GUIDED - SUPERFICIAL RADIOTHERAPY: TREATMENT VISIT
Daily Dosage (Cgy): 274.68
Additional Change To Daily Dosage Administered Mid Treatment?: No
Bill For Ultrasound Evaluation (): Yes
Treatment Documentation: This patient has been treated today with image-guided superficial radiation therapy for non-melanoma skin \\ncancer. Written informed consent has been previously obtained from this patient for this treatment. This \\nconsent is documented in the patient's chart. The patient gave verbal consent to continue treatment today. \\nThe patient was treated with a specific radiation dose and setup as prescribed by the provider listed on this \\nvisit note. A Radiation Therapist performed administration of radiation under the supervision of a provider. \\nThe treatment parameters and cumulative dose are indicated above. Prior to administering the radiation, the \\npatient underwent a verification therapeutic radiology simulation-aided field setting defining relevant normal \\nand abnormal target anatomy and acquiring images with separate and distinct diagnostic high-frequency \\nultrasound to delineate tissues and determine whether to proceed with delivery of therapeutic, in addition \\nto retrieve data necessary to develop an optimal radiation treatment process for the patient. The field \\nplacement simulation documents any change seen in overall tumor volume documented in the patient’s \\nrecord, allows the clinician to indicate any needed changes in the treatment plan and/or prescription, \\nprovides diagnostic evaluation as the basis for performing the therapeutic procedure, and clearly identifies \\nthe information needed to decide to proceed with the therapeutic procedure. This process includes\\nverification of the treatment port(s) and proper treatment positioning. All treatment ports were \\nphotographed within electronic medical records. The patient's lead blocking along with gross tumor volume \\nand margin was confirmed. Considering superficial radiotherapy is clinical in setup, this requires the physician \\nand radiation therapist to clarify the location interest being treated against initial images, ultrasound, \\npathology, and patient anatomy. Care was taken to ensure pedersen treated were geometrically accurate and \\nproperly positioned using therapeutic radiology simulation-aided field setting verification per fraction. This \\nprocess is also utilized to determine if any prescription or setup changes are necessary. These steps are \\ntherefore medically necessary to ensure safe and effective administration of radiation. Ongoing therapeutic \\nradiology simulation-aided field setting verification is ordered throughout the course of therapy.\\nA high-frequency ultrasound image was acquired today for a two-dimensional evaluation of the tumor \\nvolume, depth, width, breadth, review of prior response to treatment, provide geometric accuracy of field \\nplacement, and determine whether to proceed with therapeutic delivery. \\nThe field placement and ultrasound imaging, per fraction, is separate and distinct from the initial simulation \\nand is an important task in providing safe administration of superficial radiation therapy. Physician evaluation \\nof the ultrasound information will be ongoing throughout the course of treatment and is deemed medically \\nnecessary to ensure the efficacy of treatment, whether to proceed with therapeutic delivery, and determine \\aviva necessary changes. Today's images were evaluated for determination of continuation of treatment with \\nthe current plan or with necessary changes as appropriate. Additionally, the use of ultrasound visualization \\nand targeted assessment allows the patient to be able to see their cancer(s) progress, encouraging the patient \\nto complete and maintain compliance through the full course of prescribed radiotherapy. Per Dr. Martinez , continued \\nultrasound guidance and therapeutic radiology simulation-aided field setting verification per fraction is \\nrequired for field placement, measurement of tumor depth, tissue evaluation, progress, acute effect \\nmonitoring, and determination for therapeutic treatment delivery is appropriate.
Total Cumulative Dose (Cgy): 2472.12
Additional Comments (Add Customization Of Note Here): Erythema still very light.
Add X Modifier?: GONSALES - Unusual Non-Overlapping Service
Prescription Used: 1
Calculate Total Cumulative Dose Automatically Or Manually: Manually
Ultrasound Used Text: High frequency ultrasound depth is 1.35 mm, which is 0.20 mm in difference from previous imaging.
Fraction Number: 9
Ultrasound Not Used Text: Ultrasound was not performed today due to
Bill For Simulation (Per Medicare, Typical Course Of Radiation Therapy Will Require Between One To Three Simulations): Yes- (Simple- 1 Site: 43261)
Energy (Kv): 100

## 2024-10-02 ENCOUNTER — APPOINTMENT (OUTPATIENT)
Dept: URBAN - METROPOLITAN AREA CLINIC 301 | Age: 79
Setting detail: DERMATOLOGY
End: 2024-10-03

## 2024-10-02 PROBLEM — C44.311 BASAL CELL CARCINOMA OF SKIN OF NOSE: Status: ACTIVE | Noted: 2024-10-02

## 2024-10-02 PROCEDURE — OTHER IMAGE GUIDED - SUPERFICIAL RADIOTHERAPY: TREATMENT VISIT: OTHER

## 2024-10-02 PROCEDURE — OTHER IMAGE GUIDED - SUPERFICIAL RADIOTHERAPY: EVALUATION VISIT: OTHER

## 2024-10-02 PROCEDURE — G6001 ECHO GUIDANCE RADIOTHERAPY: HCPCS | Mod: XU

## 2024-10-02 PROCEDURE — 77280 THER RAD SIMULAJ FIELD SMPL: CPT

## 2024-10-02 PROCEDURE — OTHER IMAGE GUIDED - SUPERFICIAL RADIOTHERAPY: OTHER

## 2024-10-02 PROCEDURE — 99213 OFFICE O/P EST LOW 20 MIN: CPT | Mod: 25

## 2024-10-02 PROCEDURE — 77401 RADIATION TX DELIVERY SUPFC: CPT

## 2024-10-02 NOTE — PROCEDURE: IMAGE GUIDED - SUPERFICIAL RADIOTHERAPY: EVALUATION VISIT
Assessment: Appropriate reaction
Evaluation Plan: Plan: The patient is undergoing superficial radiation therapy for skin cancer and presents for weekly evaluation and management. Per protocol and as documented on the flow sheet, the patient was questioned as to subjective redness, pruritus, pain, drainage, fatigue, or any other symptoms. Objectively, the radiation area was evaluated with regards to erythema, atrophy, scale, crusting, erosion, ulceration, edema, purpura, tenderness, warmth, drainage, and any other findings. The plan was extensively reviewed including dose and dosing schedule. The simulation and clinical setup were also reviewed as were external and any internal shields and based on this review the appropriateness and sufficiency of treatment was determined. \\n\\nA high-frequency ultrasound image was acquired today for a two-dimensional evaluation of the tumor volume, depth, width, breadth, review of prior response to treatment, provide geometric accuracy of field placement, and determine whether to proceed with therapeutic delivery.\\n\\nPer Dr. Edward Martinez, continued ultrasound guidance and therapeutic radiology simulation-aided field setting \\nverification per fraction is required for field placement, measurement of tumor depth, tissues evaluation, progress, acute effect monitoring, and determination for therapeutic treatment delivery is appropriate.
Radiation Therapy Oncology Group (Rtog) Score: 1
Show Ultrasound In Note?: Yes
Ultrasound Not Used Text: Ultrasound was not performed today due to
Ultrasound Used Text: Ultrasound depth is 1.54 mm.
Additional Comments (Add Customization Of Note Here): Erythema and irritation
Is This Visit For Evaluation During Treatment Or Follow Up Post Treatment?: evaluation
Bill For Evaluation (34494)?: No

## 2024-10-02 NOTE — PROCEDURE: IMAGE GUIDED - SUPERFICIAL RADIOTHERAPY: TREATMENT VISIT
Daily Dosage (Cgy): 274.68
Additional Change To Daily Dosage Administered Mid Treatment?: No
Bill For Ultrasound Evaluation (): Yes
Treatment Documentation: This patient has been treated today with image-guided superficial radiation therapy for non-melanoma skin \\ncancer. Written informed consent has been previously obtained from this patient for this treatment. This \\nconsent is documented in the patient's chart. The patient gave verbal consent to continue treatment today. \\nThe patient was treated with a specific radiation dose and setup as prescribed by the provider listed on this \\nvisit note. A Radiation Therapist performed administration of radiation under the supervision of a provider. \\nThe treatment parameters and cumulative dose are indicated above. Prior to administering the radiation, the \\npatient underwent a verification therapeutic radiology simulation-aided field setting defining relevant normal \\nand abnormal target anatomy and acquiring images with separate and distinct diagnostic high-frequency \\nultrasound to delineate tissues and determine whether to proceed with delivery of therapeutic, in addition \\nto retrieve data necessary to develop an optimal radiation treatment process for the patient. The field \\nplacement simulation documents any change seen in overall tumor volume documented in the patient’s \\nrecord, allows the clinician to indicate any needed changes in the treatment plan and/or prescription, \\nprovides diagnostic evaluation as the basis for performing the therapeutic procedure, and clearly identifies \\nthe information needed to decide to proceed with the therapeutic procedure. This process includes\\nverification of the treatment port(s) and proper treatment positioning. All treatment ports were \\nphotographed within electronic medical records. The patient's lead blocking along with gross tumor volume \\nand margin was confirmed. Considering superficial radiotherapy is clinical in setup, this requires the physician \\nand radiation therapist to clarify the location interest being treated against initial images, ultrasound, \\npathology, and patient anatomy. Care was taken to ensure pedersen treated were geometrically accurate and \\nproperly positioned using therapeutic radiology simulation-aided field setting verification per fraction. This \\nprocess is also utilized to determine if any prescription or setup changes are necessary. These steps are \\ntherefore medically necessary to ensure safe and effective administration of radiation. Ongoing therapeutic \\nradiology simulation-aided field setting verification is ordered throughout the course of therapy.\\nA high-frequency ultrasound image was acquired today for a two-dimensional evaluation of the tumor \\nvolume, depth, width, breadth, review of prior response to treatment, provide geometric accuracy of field \\nplacement, and determine whether to proceed with therapeutic delivery. \\nThe field placement and ultrasound imaging, per fraction, is separate and distinct from the initial simulation \\nand is an important task in providing safe administration of superficial radiation therapy. Physician evaluation \\nof the ultrasound information will be ongoing throughout the course of treatment and is deemed medically \\nnecessary to ensure the efficacy of treatment, whether to proceed with therapeutic delivery, and determine \\aviva necessary changes. Today's images were evaluated for determination of continuation of treatment with \\nthe current plan or with necessary changes as appropriate. Additionally, the use of ultrasound visualization \\nand targeted assessment allows the patient to be able to see their cancer(s) progress, encouraging the patient \\nto complete and maintain compliance through the full course of prescribed radiotherapy. Per Dr. Martinez , continued \\nultrasound guidance and therapeutic radiology simulation-aided field setting verification per fraction is \\nrequired for field placement, measurement of tumor depth, tissue evaluation, progress, acute effect \\nmonitoring, and determination for therapeutic treatment delivery is appropriate.
Total Cumulative Dose (Cgy): 2746.8
Additional Comments (Add Customization Of Note Here): Mild erythema and two very small lesions in field. Patient states she may have scratched them in her sleep.
Add X Modifier?: GONSALES - Unusual Non-Overlapping Service
Prescription Used: 1
Calculate Total Cumulative Dose Automatically Or Manually: Manually
Ultrasound Used Text: High frequency ultrasound depth is 1.54  mm, which is 0.19 mm in difference from previous imaging.
Fraction Number: 10
Ultrasound Not Used Text: Ultrasound was not performed today due to
Bill For Simulation (Per Medicare, Typical Course Of Radiation Therapy Will Require Between One To Three Simulations): Yes- (Simple- 1 Site: 69175)
Energy (Kv): 100

## 2024-10-03 ENCOUNTER — APPOINTMENT (OUTPATIENT)
Dept: URBAN - METROPOLITAN AREA CLINIC 301 | Age: 79
Setting detail: DERMATOLOGY
End: 2024-10-03

## 2024-10-03 PROBLEM — C44.311 BASAL CELL CARCINOMA OF SKIN OF NOSE: Status: ACTIVE | Noted: 2024-10-03

## 2024-10-03 PROCEDURE — OTHER IMAGE GUIDED - SUPERFICIAL RADIOTHERAPY: TREATMENT VISIT: OTHER

## 2024-10-03 PROCEDURE — OTHER IMAGE GUIDED - SUPERFICIAL RADIOTHERAPY: OTHER

## 2024-10-03 PROCEDURE — G6001 ECHO GUIDANCE RADIOTHERAPY: HCPCS | Mod: XU

## 2024-10-03 PROCEDURE — 77280 THER RAD SIMULAJ FIELD SMPL: CPT

## 2024-10-03 PROCEDURE — 77401 RADIATION TX DELIVERY SUPFC: CPT

## 2024-10-03 NOTE — PROCEDURE: IMAGE GUIDED - SUPERFICIAL RADIOTHERAPY: TREATMENT VISIT
Daily Dosage (Cgy): 274.68
Additional Change To Daily Dosage Administered Mid Treatment?: No
Bill For Ultrasound Evaluation (): Yes
Treatment Documentation: This patient has been treated today with image-guided superficial radiation therapy for non-melanoma skin \\ncancer. Written informed consent has been previously obtained from this patient for this treatment. This \\nconsent is documented in the patient's chart. The patient gave verbal consent to continue treatment today. \\nThe patient was treated with a specific radiation dose and setup as prescribed by the provider listed on this \\nvisit note. A Radiation Therapist performed administration of radiation under the supervision of a provider. \\nThe treatment parameters and cumulative dose are indicated above. Prior to administering the radiation, the \\npatient underwent a verification therapeutic radiology simulation-aided field setting defining relevant normal \\nand abnormal target anatomy and acquiring images with separate and distinct diagnostic high-frequency \\nultrasound to delineate tissues and determine whether to proceed with delivery of therapeutic, in addition \\nto retrieve data necessary to develop an optimal radiation treatment process for the patient. The field \\nplacement simulation documents any change seen in overall tumor volume documented in the patient’s \\nrecord, allows the clinician to indicate any needed changes in the treatment plan and/or prescription, \\nprovides diagnostic evaluation as the basis for performing the therapeutic procedure, and clearly identifies \\nthe information needed to decide to proceed with the therapeutic procedure. This process includes\\nverification of the treatment port(s) and proper treatment positioning. All treatment ports were \\nphotographed within electronic medical records. The patient's lead blocking along with gross tumor volume \\nand margin was confirmed. Considering superficial radiotherapy is clinical in setup, this requires the physician \\nand radiation therapist to clarify the location interest being treated against initial images, ultrasound, \\npathology, and patient anatomy. Care was taken to ensure pedersen treated were geometrically accurate and \\nproperly positioned using therapeutic radiology simulation-aided field setting verification per fraction. This \\nprocess is also utilized to determine if any prescription or setup changes are necessary. These steps are \\ntherefore medically necessary to ensure safe and effective administration of radiation. Ongoing therapeutic \\nradiology simulation-aided field setting verification is ordered throughout the course of therapy.\\nA high-frequency ultrasound image was acquired today for a two-dimensional evaluation of the tumor \\nvolume, depth, width, breadth, review of prior response to treatment, provide geometric accuracy of field \\nplacement, and determine whether to proceed with therapeutic delivery. \\nThe field placement and ultrasound imaging, per fraction, is separate and distinct from the initial simulation \\nand is an important task in providing safe administration of superficial radiation therapy. Physician evaluation \\nof the ultrasound information will be ongoing throughout the course of treatment and is deemed medically \\nnecessary to ensure the efficacy of treatment, whether to proceed with therapeutic delivery, and determine \\aviva necessary changes. Today's images were evaluated for determination of continuation of treatment with \\nthe current plan or with necessary changes as appropriate. Additionally, the use of ultrasound visualization \\nand targeted assessment allows the patient to be able to see their cancer(s) progress, encouraging the patient \\nto complete and maintain compliance through the full course of prescribed radiotherapy. Per Dr. Martinez , continued \\nultrasound guidance and therapeutic radiology simulation-aided field setting verification per fraction is \\nrequired for field placement, measurement of tumor depth, tissue evaluation, progress, acute effect \\nmonitoring, and determination for therapeutic treatment delivery is appropriate.
Total Cumulative Dose (Cgy): 3021.48
Additional Comments (Add Customization Of Note Here): Mild erythema and two very small lesions in field. Patient states she may have scratched them in her sleep.
Add X Modifier?: GONSALES - Unusual Non-Overlapping Service
Prescription Used: 1
Calculate Total Cumulative Dose Automatically Or Manually: Manually
Ultrasound Used Text: High frequency ultrasound depth is 1.42 mm, which is 0.12 mm in difference from previous imaging.
Fraction Number: 11
Ultrasound Not Used Text: Ultrasound was not performed today due to
Bill For Simulation (Per Medicare, Typical Course Of Radiation Therapy Will Require Between One To Three Simulations): Yes- (Simple- 1 Site: 86754)
Energy (Kv): 100

## 2024-10-05 ENCOUNTER — APPOINTMENT (OUTPATIENT)
Dept: URBAN - METROPOLITAN AREA CLINIC 301 | Age: 79
Setting detail: DERMATOLOGY
End: 2024-10-05

## 2024-10-05 PROBLEM — C44.311 BASAL CELL CARCINOMA OF SKIN OF NOSE: Status: ACTIVE | Noted: 2024-10-05

## 2024-10-05 PROCEDURE — OTHER IMAGE GUIDED - SUPERFICIAL RADIOTHERAPY: OTHER

## 2024-10-05 PROCEDURE — 77336 RADIATION PHYSICS CONSULT: CPT

## 2024-10-05 NOTE — PROCEDURE: IMAGE GUIDED - SUPERFICIAL RADIOTHERAPY
Body Location Override (Optional): NASAL TIP
Detail Level: Detailed
Pathology Override (Pathology Will Render As Diagnosis Name If Left Blank): Infiltrative BCC
Field Number: 4
Lesion Dimensions-X Axis In Cm: 0.3
Lesion Margin Size In Cm: 1
Shield Size In Cm: 2.2
Applicator Size In Cm: 3.0 cm
Energy (Kv): 100
Treatment Time (Min): 0.42
Daily Dose (Cgy): 274.68
Number Of Fractions For Prescription 1: 21
Treatments Per Week: 3
Total Dose For Prescription 1 (Cgy): 5768.28
Add A Second Prescription?: No
Additional Fraction(S) Needed:: 0
Add Physics Consultation: Yes
Physics Consultation Performed For Fractions:: 6-11
Physics Documentation: Per the request of Dr. Martinez, continuing medical physics review as per radiotherapy standard of care post every 5th fraction for patient, including assessment of treatment parameters,  of dose delivery, and review of patient treatment documentation in support of the provider, to ensure efficacy and continued safe delivery of radiotherapy. Included in physics check is review of patient setup information, all pertinent simulation and treatment photographs checks, prescription, dose calculation verification, per fraction dose charted correctly, elapsed days and treatment days correctly charted, cumulative dose correct, and review of any prescription changes. Patient was not present, nor was it necessary for the patient to be present for weekly physics review and no other superficial radiotherapy services were rendered on this day. Continued medical physics review post every 5th fraction of therapy is requested by provider for appropriate radiotherapy management and is deemed medically necessary and standard of care.

## 2024-10-08 ENCOUNTER — APPOINTMENT (OUTPATIENT)
Dept: URBAN - METROPOLITAN AREA CLINIC 301 | Age: 79
Setting detail: DERMATOLOGY
End: 2024-10-11

## 2024-10-08 PROBLEM — C44.311 BASAL CELL CARCINOMA OF SKIN OF NOSE: Status: ACTIVE | Noted: 2024-10-08

## 2024-10-08 PROCEDURE — 77401 RADIATION TX DELIVERY SUPFC: CPT

## 2024-10-08 PROCEDURE — OTHER IMAGE GUIDED - SUPERFICIAL RADIOTHERAPY: TREATMENT VISIT: OTHER

## 2024-10-08 PROCEDURE — OTHER IMAGE GUIDED - SUPERFICIAL RADIOTHERAPY: OTHER

## 2024-10-08 PROCEDURE — G6001 ECHO GUIDANCE RADIOTHERAPY: HCPCS | Mod: XU

## 2024-10-08 PROCEDURE — 77280 THER RAD SIMULAJ FIELD SMPL: CPT

## 2024-10-08 NOTE — PROCEDURE: IMAGE GUIDED - SUPERFICIAL RADIOTHERAPY: TREATMENT VISIT
Daily Dosage (Cgy): 274.68
Additional Change To Daily Dosage Administered Mid Treatment?: No
Bill For Ultrasound Evaluation (): Yes
Treatment Documentation: This patient has been treated today with image-guided superficial radiation therapy for non-melanoma skin \\ncancer. Written informed consent has been previously obtained from this patient for this treatment. This \\nconsent is documented in the patient's chart. The patient gave verbal consent to continue treatment today. \\nThe patient was treated with a specific radiation dose and setup as prescribed by the provider listed on this \\nvisit note. A Radiation Therapist performed administration of radiation under the supervision of a provider. \\nThe treatment parameters and cumulative dose are indicated above. Prior to administering the radiation, the \\npatient underwent a verification therapeutic radiology simulation-aided field setting defining relevant normal \\nand abnormal target anatomy and acquiring images with separate and distinct diagnostic high-frequency \\nultrasound to delineate tissues and determine whether to proceed with delivery of therapeutic, in addition \\nto retrieve data necessary to develop an optimal radiation treatment process for the patient. The field \\nplacement simulation documents any change seen in overall tumor volume documented in the patient’s \\nrecord, allows the clinician to indicate any needed changes in the treatment plan and/or prescription, \\nprovides diagnostic evaluation as the basis for performing the therapeutic procedure, and clearly identifies \\nthe information needed to decide to proceed with the therapeutic procedure. This process includes\\nverification of the treatment port(s) and proper treatment positioning. All treatment ports were \\nphotographed within electronic medical records. The patient's lead blocking along with gross tumor volume \\nand margin was confirmed. Considering superficial radiotherapy is clinical in setup, this requires the physician \\nand radiation therapist to clarify the location interest being treated against initial images, ultrasound, \\npathology, and patient anatomy. Care was taken to ensure pedersen treated were geometrically accurate and \\nproperly positioned using therapeutic radiology simulation-aided field setting verification per fraction. This \\nprocess is also utilized to determine if any prescription or setup changes are necessary. These steps are \\ntherefore medically necessary to ensure safe and effective administration of radiation. Ongoing therapeutic \\nradiology simulation-aided field setting verification is ordered throughout the course of therapy.\\nA high-frequency ultrasound image was acquired today for a two-dimensional evaluation of the tumor \\nvolume, depth, width, breadth, review of prior response to treatment, provide geometric accuracy of field \\nplacement, and determine whether to proceed with therapeutic delivery. \\nThe field placement and ultrasound imaging, per fraction, is separate and distinct from the initial simulation \\nand is an important task in providing safe administration of superficial radiation therapy. Physician evaluation \\nof the ultrasound information will be ongoing throughout the course of treatment and is deemed medically \\nnecessary to ensure the efficacy of treatment, whether to proceed with therapeutic delivery, and determine \\aviva necessary changes. Today's images were evaluated for determination of continuation of treatment with \\nthe current plan or with necessary changes as appropriate. Additionally, the use of ultrasound visualization \\nand targeted assessment allows the patient to be able to see their cancer(s) progress, encouraging the patient \\nto complete and maintain compliance through the full course of prescribed radiotherapy. Per Dr. Martinez , continued \\nultrasound guidance and therapeutic radiology simulation-aided field setting verification per fraction is \\nrequired for field placement, measurement of tumor depth, tissue evaluation, progress, acute effect \\nmonitoring, and determination for therapeutic treatment delivery is appropriate.
Total Cumulative Dose (Cgy): 3296.16
Additional Comments (Add Customization Of Note Here): Mild erythema and two very small lesions in field. Patient states she may have scratched them in her sleep.
Add X Modifier?: GONSALES - Unusual Non-Overlapping Service
Prescription Used: 1
Calculate Total Cumulative Dose Automatically Or Manually: Manually
Ultrasound Used Text: High frequency ultrasound depth is 1.40 mm, which is 0.02 mm in difference from previous imaging.
Fraction Number: 12
Ultrasound Not Used Text: Ultrasound was not performed today due to
Bill For Simulation (Per Medicare, Typical Course Of Radiation Therapy Will Require Between One To Three Simulations): Yes- (Simple- 1 Site: 47911)
Energy (Kv): 100

## 2024-10-09 ENCOUNTER — APPOINTMENT (OUTPATIENT)
Dept: URBAN - METROPOLITAN AREA CLINIC 301 | Age: 79
Setting detail: DERMATOLOGY
End: 2024-10-11

## 2024-10-09 PROBLEM — C44.311 BASAL CELL CARCINOMA OF SKIN OF NOSE: Status: ACTIVE | Noted: 2024-10-09

## 2024-10-09 PROCEDURE — OTHER IMAGE GUIDED - SUPERFICIAL RADIOTHERAPY: OTHER

## 2024-10-09 PROCEDURE — G6001 ECHO GUIDANCE RADIOTHERAPY: HCPCS | Mod: XU

## 2024-10-09 PROCEDURE — 77280 THER RAD SIMULAJ FIELD SMPL: CPT

## 2024-10-09 PROCEDURE — 77401 RADIATION TX DELIVERY SUPFC: CPT

## 2024-10-09 PROCEDURE — OTHER IMAGE GUIDED - SUPERFICIAL RADIOTHERAPY: TREATMENT VISIT: OTHER

## 2024-10-09 NOTE — PROCEDURE: IMAGE GUIDED - SUPERFICIAL RADIOTHERAPY: TREATMENT VISIT
Daily Dosage (Cgy): 274.68
Additional Change To Daily Dosage Administered Mid Treatment?: No
Bill For Ultrasound Evaluation (): Yes
Treatment Documentation: This patient has been treated today with image-guided superficial radiation therapy for non-melanoma skin \\ncancer. Written informed consent has been previously obtained from this patient for this treatment. This \\nconsent is documented in the patient's chart. The patient gave verbal consent to continue treatment today. \\nThe patient was treated with a specific radiation dose and setup as prescribed by the provider listed on this \\nvisit note. A Radiation Therapist performed administration of radiation under the supervision of a provider. \\nThe treatment parameters and cumulative dose are indicated above. Prior to administering the radiation, the \\npatient underwent a verification therapeutic radiology simulation-aided field setting defining relevant normal \\nand abnormal target anatomy and acquiring images with separate and distinct diagnostic high-frequency \\nultrasound to delineate tissues and determine whether to proceed with delivery of therapeutic, in addition \\nto retrieve data necessary to develop an optimal radiation treatment process for the patient. The field \\nplacement simulation documents any change seen in overall tumor volume documented in the patient’s \\nrecord, allows the clinician to indicate any needed changes in the treatment plan and/or prescription, \\nprovides diagnostic evaluation as the basis for performing the therapeutic procedure, and clearly identifies \\nthe information needed to decide to proceed with the therapeutic procedure. This process includes\\nverification of the treatment port(s) and proper treatment positioning. All treatment ports were \\nphotographed within electronic medical records. The patient's lead blocking along with gross tumor volume \\nand margin was confirmed. Considering superficial radiotherapy is clinical in setup, this requires the physician \\nand radiation therapist to clarify the location interest being treated against initial images, ultrasound, \\npathology, and patient anatomy. Care was taken to ensure pedersen treated were geometrically accurate and \\nproperly positioned using therapeutic radiology simulation-aided field setting verification per fraction. This \\nprocess is also utilized to determine if any prescription or setup changes are necessary. These steps are \\ntherefore medically necessary to ensure safe and effective administration of radiation. Ongoing therapeutic \\nradiology simulation-aided field setting verification is ordered throughout the course of therapy.\\nA high-frequency ultrasound image was acquired today for a two-dimensional evaluation of the tumor \\nvolume, depth, width, breadth, review of prior response to treatment, provide geometric accuracy of field \\nplacement, and determine whether to proceed with therapeutic delivery. \\nThe field placement and ultrasound imaging, per fraction, is separate and distinct from the initial simulation \\nand is an important task in providing safe administration of superficial radiation therapy. Physician evaluation \\nof the ultrasound information will be ongoing throughout the course of treatment and is deemed medically \\nnecessary to ensure the efficacy of treatment, whether to proceed with therapeutic delivery, and determine \\aviva necessary changes. Today's images were evaluated for determination of continuation of treatment with \\nthe current plan or with necessary changes as appropriate. Additionally, the use of ultrasound visualization \\nand targeted assessment allows the patient to be able to see their cancer(s) progress, encouraging the patient \\nto complete and maintain compliance through the full course of prescribed radiotherapy. Per Dr. Martinez , continued \\nultrasound guidance and therapeutic radiology simulation-aided field setting verification per fraction is \\nrequired for field placement, measurement of tumor depth, tissue evaluation, progress, acute effect \\nmonitoring, and determination for therapeutic treatment delivery is appropriate.
Total Cumulative Dose (Cgy): 3570.84
Additional Comments (Add Customization Of Note Here): Mild erythema small scab developing.
Add X Modifier?: GONSALES - Unusual Non-Overlapping Service
Prescription Used: 1
Calculate Total Cumulative Dose Automatically Or Manually: Manually
Ultrasound Used Text: High frequency ultrasound depth is 1.32 mm, which is 0.08 mm in difference from previous imaging.
Fraction Number: 13
Ultrasound Not Used Text: Ultrasound was not performed today due to
Bill For Simulation (Per Medicare, Typical Course Of Radiation Therapy Will Require Between One To Three Simulations): Yes- (Simple- 1 Site: 34295)
Energy (Kv): 100

## 2024-10-10 ENCOUNTER — APPOINTMENT (OUTPATIENT)
Dept: URBAN - METROPOLITAN AREA CLINIC 301 | Age: 79
Setting detail: DERMATOLOGY
End: 2024-10-11

## 2024-10-10 PROBLEM — C44.311 BASAL CELL CARCINOMA OF SKIN OF NOSE: Status: ACTIVE | Noted: 2024-10-10

## 2024-10-10 PROCEDURE — G6001 ECHO GUIDANCE RADIOTHERAPY: HCPCS | Mod: XU

## 2024-10-10 PROCEDURE — 77280 THER RAD SIMULAJ FIELD SMPL: CPT

## 2024-10-10 PROCEDURE — 77401 RADIATION TX DELIVERY SUPFC: CPT

## 2024-10-10 PROCEDURE — OTHER IMAGE GUIDED - SUPERFICIAL RADIOTHERAPY: TREATMENT VISIT: OTHER

## 2024-10-10 PROCEDURE — OTHER IMAGE GUIDED - SUPERFICIAL RADIOTHERAPY: OTHER

## 2024-10-10 NOTE — PROCEDURE: IMAGE GUIDED - SUPERFICIAL RADIOTHERAPY: TREATMENT VISIT
Daily Dosage (Cgy): 274.68
Additional Change To Daily Dosage Administered Mid Treatment?: No
Bill For Ultrasound Evaluation (): Yes
Treatment Documentation: This patient has been treated today with image-guided superficial radiation therapy for non-melanoma skin \\ncancer. Written informed consent has been previously obtained from this patient for this treatment. This \\nconsent is documented in the patient's chart. The patient gave verbal consent to continue treatment today. \\nThe patient was treated with a specific radiation dose and setup as prescribed by the provider listed on this \\nvisit note. A Radiation Therapist performed administration of radiation under the supervision of a provider. \\nThe treatment parameters and cumulative dose are indicated above. Prior to administering the radiation, the \\npatient underwent a verification therapeutic radiology simulation-aided field setting defining relevant normal \\nand abnormal target anatomy and acquiring images with separate and distinct diagnostic high-frequency \\nultrasound to delineate tissues and determine whether to proceed with delivery of therapeutic, in addition \\nto retrieve data necessary to develop an optimal radiation treatment process for the patient. The field \\nplacement simulation documents any change seen in overall tumor volume documented in the patient’s \\nrecord, allows the clinician to indicate any needed changes in the treatment plan and/or prescription, \\nprovides diagnostic evaluation as the basis for performing the therapeutic procedure, and clearly identifies \\nthe information needed to decide to proceed with the therapeutic procedure. This process includes\\nverification of the treatment port(s) and proper treatment positioning. All treatment ports were \\nphotographed within electronic medical records. The patient's lead blocking along with gross tumor volume \\nand margin was confirmed. Considering superficial radiotherapy is clinical in setup, this requires the physician \\nand radiation therapist to clarify the location interest being treated against initial images, ultrasound, \\npathology, and patient anatomy. Care was taken to ensure pedersen treated were geometrically accurate and \\nproperly positioned using therapeutic radiology simulation-aided field setting verification per fraction. This \\nprocess is also utilized to determine if any prescription or setup changes are necessary. These steps are \\ntherefore medically necessary to ensure safe and effective administration of radiation. Ongoing therapeutic \\nradiology simulation-aided field setting verification is ordered throughout the course of therapy.\\nA high-frequency ultrasound image was acquired today for a two-dimensional evaluation of the tumor \\nvolume, depth, width, breadth, review of prior response to treatment, provide geometric accuracy of field \\nplacement, and determine whether to proceed with therapeutic delivery. \\nThe field placement and ultrasound imaging, per fraction, is separate and distinct from the initial simulation \\nand is an important task in providing safe administration of superficial radiation therapy. Physician evaluation \\nof the ultrasound information will be ongoing throughout the course of treatment and is deemed medically \\nnecessary to ensure the efficacy of treatment, whether to proceed with therapeutic delivery, and determine \\aviva necessary changes. Today's images were evaluated for determination of continuation of treatment with \\nthe current plan or with necessary changes as appropriate. Additionally, the use of ultrasound visualization \\nand targeted assessment allows the patient to be able to see their cancer(s) progress, encouraging the patient \\nto complete and maintain compliance through the full course of prescribed radiotherapy. Per Dr. Martinez , continued \\nultrasound guidance and therapeutic radiology simulation-aided field setting verification per fraction is \\nrequired for field placement, measurement of tumor depth, tissue evaluation, progress, acute effect \\nmonitoring, and determination for therapeutic treatment delivery is appropriate.
Total Cumulative Dose (Cgy): 3845.52
Additional Comments (Add Customization Of Note Here): Mild erythema small scab developing.
Add X Modifier?: GONSALES - Unusual Non-Overlapping Service
Prescription Used: 1
Calculate Total Cumulative Dose Automatically Or Manually: Manually
Ultrasound Used Text: High frequency ultrasound depth is 1.49 mm, which is 0.17 mm in difference from previous imaging.
Fraction Number: 14
Ultrasound Not Used Text: Ultrasound was not performed today due to
Bill For Simulation (Per Medicare, Typical Course Of Radiation Therapy Will Require Between One To Three Simulations): Yes- (Simple- 1 Site: 32569)
Energy (Kv): 100

## 2024-10-15 ENCOUNTER — APPOINTMENT (OUTPATIENT)
Dept: URBAN - METROPOLITAN AREA CLINIC 301 | Age: 79
Setting detail: DERMATOLOGY
End: 2024-10-17

## 2024-10-15 PROBLEM — C44.311 BASAL CELL CARCINOMA OF SKIN OF NOSE: Status: ACTIVE | Noted: 2024-10-15

## 2024-10-15 PROCEDURE — 99213 OFFICE O/P EST LOW 20 MIN: CPT | Mod: 25

## 2024-10-15 PROCEDURE — OTHER IMAGE GUIDED - SUPERFICIAL RADIOTHERAPY: EVALUATION VISIT: OTHER

## 2024-10-15 PROCEDURE — 77401 RADIATION TX DELIVERY SUPFC: CPT

## 2024-10-15 PROCEDURE — 77280 THER RAD SIMULAJ FIELD SMPL: CPT

## 2024-10-15 PROCEDURE — OTHER IMAGE GUIDED - SUPERFICIAL RADIOTHERAPY: OTHER

## 2024-10-15 PROCEDURE — OTHER IMAGE GUIDED - SUPERFICIAL RADIOTHERAPY: TREATMENT VISIT: OTHER

## 2024-10-15 PROCEDURE — G6001 ECHO GUIDANCE RADIOTHERAPY: HCPCS | Mod: XU

## 2024-10-15 NOTE — PROCEDURE: IMAGE GUIDED - SUPERFICIAL RADIOTHERAPY: TREATMENT VISIT
Daily Dosage (Cgy): 274.68
Additional Change To Daily Dosage Administered Mid Treatment?: No
Bill For Ultrasound Evaluation (): Yes
Treatment Documentation: This patient has been treated today with image-guided superficial radiation therapy for non-melanoma skin \\ncancer. Written informed consent has been previously obtained from this patient for this treatment. This \\nconsent is documented in the patient's chart. The patient gave verbal consent to continue treatment today. \\nThe patient was treated with a specific radiation dose and setup as prescribed by the provider listed on this \\nvisit note. A Radiation Therapist performed administration of radiation under the supervision of a provider. \\nThe treatment parameters and cumulative dose are indicated above. Prior to administering the radiation, the \\npatient underwent a verification therapeutic radiology simulation-aided field setting defining relevant normal \\nand abnormal target anatomy and acquiring images with separate and distinct diagnostic high-frequency \\nultrasound to delineate tissues and determine whether to proceed with delivery of therapeutic, in addition \\nto retrieve data necessary to develop an optimal radiation treatment process for the patient. The field \\nplacement simulation documents any change seen in overall tumor volume documented in the patient’s \\nrecord, allows the clinician to indicate any needed changes in the treatment plan and/or prescription, \\nprovides diagnostic evaluation as the basis for performing the therapeutic procedure, and clearly identifies \\nthe information needed to decide to proceed with the therapeutic procedure. This process includes\\nverification of the treatment port(s) and proper treatment positioning. All treatment ports were \\nphotographed within electronic medical records. The patient's lead blocking along with gross tumor volume \\nand margin was confirmed. Considering superficial radiotherapy is clinical in setup, this requires the physician \\nand radiation therapist to clarify the location interest being treated against initial images, ultrasound, \\npathology, and patient anatomy. Care was taken to ensure pedersen treated were geometrically accurate and \\nproperly positioned using therapeutic radiology simulation-aided field setting verification per fraction. This \\nprocess is also utilized to determine if any prescription or setup changes are necessary. These steps are \\ntherefore medically necessary to ensure safe and effective administration of radiation. Ongoing therapeutic \\nradiology simulation-aided field setting verification is ordered throughout the course of therapy.\\nA high-frequency ultrasound image was acquired today for a two-dimensional evaluation of the tumor \\nvolume, depth, width, breadth, review of prior response to treatment, provide geometric accuracy of field \\nplacement, and determine whether to proceed with therapeutic delivery. \\nThe field placement and ultrasound imaging, per fraction, is separate and distinct from the initial simulation \\nand is an important task in providing safe administration of superficial radiation therapy. Physician evaluation \\nof the ultrasound information will be ongoing throughout the course of treatment and is deemed medically \\nnecessary to ensure the efficacy of treatment, whether to proceed with therapeutic delivery, and determine \\aviva necessary changes. Today's images were evaluated for determination of continuation of treatment with \\nthe current plan or with necessary changes as appropriate. Additionally, the use of ultrasound visualization \\nand targeted assessment allows the patient to be able to see their cancer(s) progress, encouraging the patient \\nto complete and maintain compliance through the full course of prescribed radiotherapy. Per Dr. Martinez , continued \\nultrasound guidance and therapeutic radiology simulation-aided field setting verification per fraction is \\nrequired for field placement, measurement of tumor depth, tissue evaluation, progress, acute effect \\nmonitoring, and determination for therapeutic treatment delivery is appropriate.
Total Cumulative Dose (Cgy): 4120.2
Additional Comments (Add Customization Of Note Here): Patient has a small amount of moist desquamation. She had dried blood on it this morning and she removed it with Aquaphor.
Add X Modifier?: GONSALES - Unusual Non-Overlapping Service
Prescription Used: 1
Calculate Total Cumulative Dose Automatically Or Manually: Manually
Ultrasound Used Text: High frequency ultrasound depth is 1.16 mm, which is 0.33 mm in difference from previous imaging.
Fraction Number: 15
Ultrasound Not Used Text: Ultrasound was not performed today due to
Bill For Simulation (Per Medicare, Typical Course Of Radiation Therapy Will Require Between One To Three Simulations): Yes- (Simple- 1 Site: 39081)
Energy (Kv): 100

## 2024-10-15 NOTE — PROCEDURE: IMAGE GUIDED - SUPERFICIAL RADIOTHERAPY: EVALUATION VISIT
Ultrasound Not Used Text: Ultrasound was not performed today due to
Bill For Ultrasound Evaluation (): Yes
Assessment: Appropriate reaction
Ultrasound Used Text: Ultrasound depth is 1.16 mm.
Radiation Therapy Oncology Group (Rtog) Score: 2
Is This Visit For Evaluation During Treatment Or Follow Up Post Treatment?: evaluation
Bill For Evaluation (06457)?: No
Evaluation Plan: Plan: The patient is undergoing superficial radiation therapy for skin cancer and presents for weekly evaluation and management. Per protocol and as documented on the flow sheet, the patient was questioned as to subjective redness, pruritus, pain, drainage, fatigue, or any other symptoms. Objectively, the radiation area was evaluated with regards to erythema, atrophy, scale, crusting, erosion, ulceration, edema, purpura, tenderness, warmth, drainage, and any other findings. The plan was extensively reviewed including dose and dosing schedule. The simulation and clinical setup were also reviewed as were external and any internal shields and based on this review the appropriateness and sufficiency of treatment was determined. \\n\\nA high-frequency ultrasound image was acquired today for a two-dimensional evaluation of the tumor volume, depth, width, breadth, review of prior response to treatment, provide geometric accuracy of field placement, and determine whether to proceed with therapeutic delivery.\\n\\nPer Dr. Edward Martinez, continued ultrasound guidance and therapeutic radiology simulation-aided field setting verification per fraction is required for field placement, measurement of tumor depth, tissues evaluation, progress, acute effect monitoring, and determination for therapeutic treatment delivery is appropriate.

## 2024-10-16 ENCOUNTER — APPOINTMENT (OUTPATIENT)
Dept: URBAN - METROPOLITAN AREA CLINIC 301 | Age: 79
Setting detail: DERMATOLOGY
End: 2024-10-17

## 2024-10-16 PROBLEM — C44.311 BASAL CELL CARCINOMA OF SKIN OF NOSE: Status: ACTIVE | Noted: 2024-10-16

## 2024-10-16 PROCEDURE — 77401 RADIATION TX DELIVERY SUPFC: CPT

## 2024-10-16 PROCEDURE — OTHER IMAGE GUIDED - SUPERFICIAL RADIOTHERAPY: OTHER

## 2024-10-16 PROCEDURE — 77280 THER RAD SIMULAJ FIELD SMPL: CPT

## 2024-10-16 PROCEDURE — OTHER IMAGE GUIDED - SUPERFICIAL RADIOTHERAPY: TREATMENT VISIT: OTHER

## 2024-10-16 PROCEDURE — G6001 ECHO GUIDANCE RADIOTHERAPY: HCPCS | Mod: XU

## 2024-10-16 NOTE — PROCEDURE: IMAGE GUIDED - SUPERFICIAL RADIOTHERAPY: TREATMENT VISIT
Ultrasound Used Text: High frequency ultrasound depth is 2.04 mm, which is 0.88 mm in difference from previous imaging.

## 2024-10-16 NOTE — PROCEDURE: IMAGE GUIDED - SUPERFICIAL RADIOTHERAPY: TREATMENT VISIT
Treatment Documentation: This patient has been treated today with image-guided superficial radiation therapy for non-melanoma skin \\ncancer. Written informed consent has been previously obtained from this patient for this treatment. This \\nconsent is documented in the patient's chart. The patient gave verbal consent to continue treatment today. \\nThe patient was treated with a specific radiation dose and setup as prescribed by the provider listed on this \\nvisit note. A Radiation Therapist performed administration of radiation under the supervision of a provider. \\nThe treatment parameters and cumulative dose are indicated above. Prior to administering the radiation, the \\npatient underwent a verification therapeutic radiology simulation-aided field setting defining relevant normal \\nand abnormal target anatomy and acquiring images with separate and distinct diagnostic high-frequency \\nultrasound to delineate tissues and determine whether to proceed with delivery of therapeutic, in addition \\nto retrieve data necessary to develop an optimal radiation treatment process for the patient. The field \\nplacement simulation documents any change seen in overall tumor volume documented in the patient’s \\nrecord, allows the clinician to indicate any needed changes in the treatment plan and/or prescription, \\nprovides diagnostic evaluation as the basis for performing the therapeutic procedure, and clearly identifies \\nthe information needed to decide to proceed with the therapeutic procedure. This process includes\\nverification of the treatment port(s) and proper treatment positioning. All treatment ports were \\nphotographed within electronic medical records. The patient's lead blocking along with gross tumor volume \\nand margin was confirmed. Considering superficial radiotherapy is clinical in setup, this requires the physician \\nand radiation therapist to clarify the location interest being treated against initial images, ultrasound, \\npathology, and patient anatomy. Care was taken to ensure pedersen treated were geometrically accurate and \\nproperly positioned using therapeutic radiology simulation-aided field setting verification per fraction. This \\nprocess is also utilized to determine if any prescription or setup changes are necessary. These steps are \\ntherefore medically necessary to ensure safe and effective administration of radiation. Ongoing therapeutic \\nradiology simulation-aided field setting verification is ordered throughout the course of therapy.\\nA high-frequency ultrasound image was acquired today for a two-dimensional evaluation of the tumor \\nvolume, depth, width, breadth, review of prior response to treatment, provide geometric accuracy of field \\nplacement, and determine whether to proceed with therapeutic delivery. \\nThe field placement and ultrasound imaging, per fraction, is separate and distinct from the initial simulation \\nand is an important task in providing safe administration of superficial radiation therapy. Physician evaluation \\nof the ultrasound information will be ongoing throughout the course of treatment and is deemed medically \\nnecessary to ensure the efficacy of treatment, whether to proceed with therapeutic delivery, and determine \\aviva necessary changes. Today's images were evaluated for determination of continuation of treatment with \\nthe current plan or with necessary changes as appropriate. Additionally, the use of ultrasound visualization \\nand targeted assessment allows the patient to be able to see their cancer(s) progress, encouraging the patient \\nto complete and maintain compliance through the full course of prescribed radiotherapy. Per Dr. Martinez , continued \\nultrasound guidance and therapeutic radiology simulation-aided field setting verification per fraction is \\nrequired for field placement, measurement of tumor depth, tissue evaluation, progress, acute effect \\nmonitoring, and determination for therapeutic treatment delivery is appropriate.

## 2024-10-16 NOTE — PROCEDURE: IMAGE GUIDED - SUPERFICIAL RADIOTHERAPY: TREATMENT VISIT
Bill For Simulation (Per Medicare, Typical Course Of Radiation Therapy Will Require Between One To Three Simulations): Yes- (Simple- 1 Site: 58250)

## 2024-10-19 ENCOUNTER — APPOINTMENT (OUTPATIENT)
Dept: URBAN - METROPOLITAN AREA CLINIC 301 | Age: 79
Setting detail: DERMATOLOGY
End: 2024-10-19

## 2024-10-19 PROBLEM — C44.311 BASAL CELL CARCINOMA OF SKIN OF NOSE: Status: ACTIVE | Noted: 2024-10-19

## 2024-10-19 PROCEDURE — OTHER IMAGE GUIDED - SUPERFICIAL RADIOTHERAPY: OTHER

## 2024-10-19 PROCEDURE — 77336 RADIATION PHYSICS CONSULT: CPT

## 2024-10-19 NOTE — PROCEDURE: IMAGE GUIDED - SUPERFICIAL RADIOTHERAPY
Body Location Override (Optional): NASAL TIP
Detail Level: Detailed
Pathology Override (Pathology Will Render As Diagnosis Name If Left Blank): Infiltrative BCC
Field Number: 4
Lesion Dimensions-X Axis In Cm: 0.3
Lesion Margin Size In Cm: 1
Shield Size In Cm: 2.2
Applicator Size In Cm: 3.0 cm
Energy (Kv): 100
Treatment Time (Min): 0.42
Daily Dose (Cgy): 274.68
Number Of Fractions For Prescription 1: 21
Treatments Per Week: 3
Total Dose For Prescription 1 (Cgy): 5768.28
Add A Second Prescription?: No
Additional Fraction(S) Needed:: 0
Add Physics Consultation: Yes
Physics Consultation Performed For Fractions:: 12-16
Physics Documentation: Per the request of Dr. Martinez, continuing medical physics review as per radiotherapy standard of care post every 5th fraction for patient, including assessment of treatment parameters,  of dose delivery, and review of patient treatment documentation in support of the provider, to ensure efficacy and continued safe delivery of radiotherapy. Included in physics check is review of patient setup information, all pertinent simulation and treatment photographs checks, prescription, dose calculation verification, per fraction dose charted correctly, elapsed days and treatment days correctly charted, cumulative dose correct, and review of any prescription changes. Patient was not present, nor was it necessary for the patient to be present for weekly physics review and no other superficial radiotherapy services were rendered on this day. Continued medical physics review post every 5th fraction of therapy is requested by provider for appropriate radiotherapy management and is deemed medically necessary and standard of care.

## 2024-10-22 ENCOUNTER — APPOINTMENT (OUTPATIENT)
Dept: URBAN - METROPOLITAN AREA CLINIC 301 | Age: 79
Setting detail: DERMATOLOGY
End: 2024-10-24

## 2024-10-22 PROBLEM — C44.311 BASAL CELL CARCINOMA OF SKIN OF NOSE: Status: ACTIVE | Noted: 2024-10-22

## 2024-10-22 PROCEDURE — OTHER IMAGE GUIDED - SUPERFICIAL RADIOTHERAPY: TREATMENT VISIT: OTHER

## 2024-10-22 PROCEDURE — OTHER IMAGE GUIDED - SUPERFICIAL RADIOTHERAPY: OTHER

## 2024-10-22 PROCEDURE — G6001 ECHO GUIDANCE RADIOTHERAPY: HCPCS | Mod: XU

## 2024-10-22 PROCEDURE — 77280 THER RAD SIMULAJ FIELD SMPL: CPT

## 2024-10-22 PROCEDURE — 77401 RADIATION TX DELIVERY SUPFC: CPT

## 2024-10-22 NOTE — PROCEDURE: IMAGE GUIDED - SUPERFICIAL RADIOTHERAPY
Body Location Override (Optional): NASAL TIP
NUTRITION SERVICES: BMI - Pt with BMI >40 (=Body mass index is 41.93 kg/m².). Weight loss counseling not appropriate in acute care setting. RECOMMEND - Referral to outpatient nutrition services for weight management after D/C.     
Detail Level: Detailed
Pathology Override (Pathology Will Render As Diagnosis Name If Left Blank): Infiltrative BCC
Field Number: 4
Lesion Dimensions-X Axis In Cm: 0.3
Lesion Margin Size In Cm: 1
Shield Size In Cm: 2.2
Applicator Size In Cm: 3.0 cm
Energy (Kv): 100
Treatment Time (Min): 0.42
Daily Dose (Cgy): 274.68
Number Of Fractions For Prescription 1: 20
Treatments Per Week: 3
Total Dose For Prescription 1 (Cgy): 5768.28
Add A Second Prescription?: No
Additional Fraction(S) Needed:: 0
Bill For Physics Consultation: No - Render Text Only
Physics Documentation: (Physics Check Verbiage)

## 2024-10-22 NOTE — PROCEDURE: IMAGE GUIDED - SUPERFICIAL RADIOTHERAPY: TREATMENT VISIT
Daily Dosage (Cgy): 274.68
Additional Change To Daily Dosage Administered Mid Treatment?: No
Bill For Ultrasound Evaluation (): Yes
Treatment Documentation: This patient has been treated today with image-guided superficial radiation therapy for non-melanoma skin \\ncancer. Written informed consent has been previously obtained from this patient for this treatment. This \\nconsent is documented in the patient's chart. The patient gave verbal consent to continue treatment today. \\nThe patient was treated with a specific radiation dose and setup as prescribed by the provider listed on this \\nvisit note. A Radiation Therapist performed administration of radiation under the supervision of a provider. \\nThe treatment parameters and cumulative dose are indicated above. Prior to administering the radiation, the \\npatient underwent a verification therapeutic radiology simulation-aided field setting defining relevant normal \\nand abnormal target anatomy and acquiring images with separate and distinct diagnostic high-frequency \\nultrasound to delineate tissues and determine whether to proceed with delivery of therapeutic, in addition \\nto retrieve data necessary to develop an optimal radiation treatment process for the patient. The field \\nplacement simulation documents any change seen in overall tumor volume documented in the patient’s \\nrecord, allows the clinician to indicate any needed changes in the treatment plan and/or prescription, \\nprovides diagnostic evaluation as the basis for performing the therapeutic procedure, and clearly identifies \\nthe information needed to decide to proceed with the therapeutic procedure. This process includes\\nverification of the treatment port(s) and proper treatment positioning. All treatment ports were \\nphotographed within electronic medical records. The patient's lead blocking along with gross tumor volume \\nand margin was confirmed. Considering superficial radiotherapy is clinical in setup, this requires the physician \\nand radiation therapist to clarify the location interest being treated against initial images, ultrasound, \\npathology, and patient anatomy. Care was taken to ensure pedersen treated were geometrically accurate and \\nproperly positioned using therapeutic radiology simulation-aided field setting verification per fraction. This \\nprocess is also utilized to determine if any prescription or setup changes are necessary. These steps are \\ntherefore medically necessary to ensure safe and effective administration of radiation. Ongoing therapeutic \\nradiology simulation-aided field setting verification is ordered throughout the course of therapy.\\nA high-frequency ultrasound image was acquired today for a two-dimensional evaluation of the tumor \\nvolume, depth, width, breadth, review of prior response to treatment, provide geometric accuracy of field \\nplacement, and determine whether to proceed with therapeutic delivery. \\nThe field placement and ultrasound imaging, per fraction, is separate and distinct from the initial simulation \\nand is an important task in providing safe administration of superficial radiation therapy. Physician evaluation \\nof the ultrasound information will be ongoing throughout the course of treatment and is deemed medically \\nnecessary to ensure the efficacy of treatment, whether to proceed with therapeutic delivery, and determine \\aviva necessary changes. Today's images were evaluated for determination of continuation of treatment with \\nthe current plan or with necessary changes as appropriate. Additionally, the use of ultrasound visualization \\nand targeted assessment allows the patient to be able to see their cancer(s) progress, encouraging the patient \\nto complete and maintain compliance through the full course of prescribed radiotherapy. Per Dr. Martinez , continued \\nultrasound guidance and therapeutic radiology simulation-aided field setting verification per fraction is \\nrequired for field placement, measurement of tumor depth, tissue evaluation, progress, acute effect \\nmonitoring, and determination for therapeutic treatment delivery is appropriate.
Total Cumulative Dose (Cgy): 4669.56
Additional Comments (Add Customization Of Note Here): No change from OTV day.
Add X Modifier?: GONSALES - Unusual Non-Overlapping Service
Prescription Used: 1
Calculate Total Cumulative Dose Automatically Or Manually: Manually
Ultrasound Used Text: High frequency ultrasound depth is 1.32 mm, which is 0.72 mm in difference from previous imaging.
Fraction Number: 17
Ultrasound Not Used Text: Ultrasound was not performed today due to
Bill For Simulation (Per Medicare, Typical Course Of Radiation Therapy Will Require Between One To Three Simulations): Yes- (Simple- 1 Site: 64161)
Energy (Kv): 100

## 2024-10-23 ENCOUNTER — APPOINTMENT (OUTPATIENT)
Dept: URBAN - METROPOLITAN AREA CLINIC 301 | Age: 79
Setting detail: DERMATOLOGY
End: 2024-10-26

## 2024-10-23 PROBLEM — C44.311 BASAL CELL CARCINOMA OF SKIN OF NOSE: Status: ACTIVE | Noted: 2024-10-23

## 2024-10-23 PROCEDURE — OTHER IMAGE GUIDED - SUPERFICIAL RADIOTHERAPY: OTHER

## 2024-10-23 PROCEDURE — OTHER IMAGE GUIDED - SUPERFICIAL RADIOTHERAPY: TREATMENT VISIT: OTHER

## 2024-10-23 PROCEDURE — G6001 ECHO GUIDANCE RADIOTHERAPY: HCPCS | Mod: XU

## 2024-10-23 PROCEDURE — 77401 RADIATION TX DELIVERY SUPFC: CPT

## 2024-10-23 PROCEDURE — 77280 THER RAD SIMULAJ FIELD SMPL: CPT

## 2024-10-23 NOTE — PROCEDURE: IMAGE GUIDED - SUPERFICIAL RADIOTHERAPY: TREATMENT VISIT
Daily Dosage (Cgy): 274.68
Additional Change To Daily Dosage Administered Mid Treatment?: No
Bill For Ultrasound Evaluation (): Yes
Treatment Documentation: This patient has been treated today with image-guided superficial radiation therapy for non-melanoma skin \\ncancer. Written informed consent has been previously obtained from this patient for this treatment. This \\nconsent is documented in the patient's chart. The patient gave verbal consent to continue treatment today. \\nThe patient was treated with a specific radiation dose and setup as prescribed by the provider listed on this \\nvisit note. A Radiation Therapist performed administration of radiation under the supervision of a provider. \\nThe treatment parameters and cumulative dose are indicated above. Prior to administering the radiation, the \\npatient underwent a verification therapeutic radiology simulation-aided field setting defining relevant normal \\nand abnormal target anatomy and acquiring images with separate and distinct diagnostic high-frequency \\nultrasound to delineate tissues and determine whether to proceed with delivery of therapeutic, in addition \\nto retrieve data necessary to develop an optimal radiation treatment process for the patient. The field \\nplacement simulation documents any change seen in overall tumor volume documented in the patient’s \\nrecord, allows the clinician to indicate any needed changes in the treatment plan and/or prescription, \\nprovides diagnostic evaluation as the basis for performing the therapeutic procedure, and clearly identifies \\nthe information needed to decide to proceed with the therapeutic procedure. This process includes\\nverification of the treatment port(s) and proper treatment positioning. All treatment ports were \\nphotographed within electronic medical records. The patient's lead blocking along with gross tumor volume \\nand margin was confirmed. Considering superficial radiotherapy is clinical in setup, this requires the physician \\nand radiation therapist to clarify the location interest being treated against initial images, ultrasound, \\npathology, and patient anatomy. Care was taken to ensure pedersen treated were geometrically accurate and \\nproperly positioned using therapeutic radiology simulation-aided field setting verification per fraction. This \\nprocess is also utilized to determine if any prescription or setup changes are necessary. These steps are \\ntherefore medically necessary to ensure safe and effective administration of radiation. Ongoing therapeutic \\nradiology simulation-aided field setting verification is ordered throughout the course of therapy.\\nA high-frequency ultrasound image was acquired today for a two-dimensional evaluation of the tumor \\nvolume, depth, width, breadth, review of prior response to treatment, provide geometric accuracy of field \\nplacement, and determine whether to proceed with therapeutic delivery. \\nThe field placement and ultrasound imaging, per fraction, is separate and distinct from the initial simulation \\nand is an important task in providing safe administration of superficial radiation therapy. Physician evaluation \\nof the ultrasound information will be ongoing throughout the course of treatment and is deemed medically \\nnecessary to ensure the efficacy of treatment, whether to proceed with therapeutic delivery, and determine \\aviva necessary changes. Today's images were evaluated for determination of continuation of treatment with \\nthe current plan or with necessary changes as appropriate. Additionally, the use of ultrasound visualization \\nand targeted assessment allows the patient to be able to see their cancer(s) progress, encouraging the patient \\nto complete and maintain compliance through the full course of prescribed radiotherapy. Per Dr. Martinez , continued \\nultrasound guidance and therapeutic radiology simulation-aided field setting verification per fraction is \\nrequired for field placement, measurement of tumor depth, tissue evaluation, progress, acute effect \\nmonitoring, and determination for therapeutic treatment delivery is appropriate.
Total Cumulative Dose (Cgy): 4944.24
Additional Comments (Add Customization Of Note Here): Erythema and tenderness, patient has some dry blood in the treatment area.
Add X Modifier?: GONSALES - Unusual Non-Overlapping Service
Prescription Used: 1
Calculate Total Cumulative Dose Automatically Or Manually: Manually
Ultrasound Used Text: High frequency ultrasound depth is 1.95 mm, which is 0.63 mm in difference from previous imaging.
Fraction Number: 18
Ultrasound Not Used Text: Ultrasound was not performed today due to
Bill For Simulation (Per Medicare, Typical Course Of Radiation Therapy Will Require Between One To Three Simulations): Yes- (Simple- 1 Site: 99110)
Energy (Kv): 100

## 2024-10-28 ENCOUNTER — APPOINTMENT (OUTPATIENT)
Dept: URBAN - METROPOLITAN AREA CLINIC 301 | Age: 79
Setting detail: DERMATOLOGY
End: 2024-10-28

## 2024-10-28 PROBLEM — C44.311 BASAL CELL CARCINOMA OF SKIN OF NOSE: Status: ACTIVE | Noted: 2024-10-28

## 2024-10-28 PROCEDURE — G6001 ECHO GUIDANCE RADIOTHERAPY: HCPCS | Mod: XU

## 2024-10-28 PROCEDURE — 77401 RADIATION TX DELIVERY SUPFC: CPT

## 2024-10-28 PROCEDURE — 77280 THER RAD SIMULAJ FIELD SMPL: CPT

## 2024-10-28 PROCEDURE — OTHER IMAGE GUIDED - SUPERFICIAL RADIOTHERAPY: TREATMENT VISIT: OTHER

## 2024-10-28 PROCEDURE — OTHER IMAGE GUIDED - SUPERFICIAL RADIOTHERAPY: OTHER

## 2024-10-28 NOTE — PROCEDURE: IMAGE GUIDED - SUPERFICIAL RADIOTHERAPY: TREATMENT VISIT
Daily Dosage (Cgy): 274.68
Additional Change To Daily Dosage Administered Mid Treatment?: No
Bill For Ultrasound Evaluation (): Yes
Treatment Documentation: This patient has been treated today with image-guided superficial radiation therapy for non-melanoma skin \\ncancer. Written informed consent has been previously obtained from this patient for this treatment. This \\nconsent is documented in the patient's chart. The patient gave verbal consent to continue treatment today. \\nThe patient was treated with a specific radiation dose and setup as prescribed by the provider listed on this \\nvisit note. A Radiation Therapist performed administration of radiation under the supervision of a provider. \\nThe treatment parameters and cumulative dose are indicated above. Prior to administering the radiation, the \\npatient underwent a verification therapeutic radiology simulation-aided field setting defining relevant normal \\nand abnormal target anatomy and acquiring images with separate and distinct diagnostic high-frequency \\nultrasound to delineate tissues and determine whether to proceed with delivery of therapeutic, in addition \\nto retrieve data necessary to develop an optimal radiation treatment process for the patient. The field \\nplacement simulation documents any change seen in overall tumor volume documented in the patient’s \\nrecord, allows the clinician to indicate any needed changes in the treatment plan and/or prescription, \\nprovides diagnostic evaluation as the basis for performing the therapeutic procedure, and clearly identifies \\nthe information needed to decide to proceed with the therapeutic procedure. This process includes\\nverification of the treatment port(s) and proper treatment positioning. All treatment ports were \\nphotographed within electronic medical records. The patient's lead blocking along with gross tumor volume \\nand margin was confirmed. Considering superficial radiotherapy is clinical in setup, this requires the physician \\nand radiation therapist to clarify the location interest being treated against initial images, ultrasound, \\npathology, and patient anatomy. Care was taken to ensure pedersen treated were geometrically accurate and \\nproperly positioned using therapeutic radiology simulation-aided field setting verification per fraction. This \\nprocess is also utilized to determine if any prescription or setup changes are necessary. These steps are \\ntherefore medically necessary to ensure safe and effective administration of radiation. Ongoing therapeutic \\nradiology simulation-aided field setting verification is ordered throughout the course of therapy.\\nA high-frequency ultrasound image was acquired today for a two-dimensional evaluation of the tumor \\nvolume, depth, width, breadth, review of prior response to treatment, provide geometric accuracy of field \\nplacement, and determine whether to proceed with therapeutic delivery. \\nThe field placement and ultrasound imaging, per fraction, is separate and distinct from the initial simulation \\nand is an important task in providing safe administration of superficial radiation therapy. Physician evaluation \\nof the ultrasound information will be ongoing throughout the course of treatment and is deemed medically \\nnecessary to ensure the efficacy of treatment, whether to proceed with therapeutic delivery, and determine \\aviva necessary changes. Today's images were evaluated for determination of continuation of treatment with \\nthe current plan or with necessary changes as appropriate. Additionally, the use of ultrasound visualization \\nand targeted assessment allows the patient to be able to see their cancer(s) progress, encouraging the patient \\nto complete and maintain compliance through the full course of prescribed radiotherapy. Per Dr. Martinez , continued \\nultrasound guidance and therapeutic radiology simulation-aided field setting verification per fraction is \\nrequired for field placement, measurement of tumor depth, tissue evaluation, progress, acute effect \\nmonitoring, and determination for therapeutic treatment delivery is appropriate.
Total Cumulative Dose (Cgy): 5218.92
Additional Comments (Add Customization Of Note Here): Erythema and tenderness, patient has some dry blood in the treatment area.
Add X Modifier?: GONSLAES - Unusual Non-Overlapping Service
Prescription Used: 1
Calculate Total Cumulative Dose Automatically Or Manually: Manually
Ultrasound Used Text: High frequency ultrasound depth is 2.53 mm, which is 0.58 mm in difference from previous imaging.
Fraction Number: 19
Ultrasound Not Used Text: Ultrasound was not performed today due to
Bill For Simulation (Per Medicare, Typical Course Of Radiation Therapy Will Require Between One To Three Simulations): Yes- (Simple- 1 Site: 40701)
Energy (Kv): 100

## 2024-10-30 ENCOUNTER — APPOINTMENT (OUTPATIENT)
Dept: URBAN - METROPOLITAN AREA CLINIC 301 | Age: 79
Setting detail: DERMATOLOGY
End: 2024-10-30

## 2024-10-30 PROBLEM — C44.311 BASAL CELL CARCINOMA OF SKIN OF NOSE: Status: ACTIVE | Noted: 2024-10-30

## 2024-10-30 PROCEDURE — G6001 ECHO GUIDANCE RADIOTHERAPY: HCPCS | Mod: XU

## 2024-10-30 PROCEDURE — OTHER IMAGE GUIDED - SUPERFICIAL RADIOTHERAPY: OTHER

## 2024-10-30 PROCEDURE — 77280 THER RAD SIMULAJ FIELD SMPL: CPT

## 2024-10-30 PROCEDURE — OTHER IMAGE GUIDED - SUPERFICIAL RADIOTHERAPY: EVALUATION VISIT: OTHER

## 2024-10-30 PROCEDURE — 77401 RADIATION TX DELIVERY SUPFC: CPT

## 2024-10-30 PROCEDURE — 99213 OFFICE O/P EST LOW 20 MIN: CPT | Mod: 25

## 2024-10-30 PROCEDURE — OTHER IMAGE GUIDED - SUPERFICIAL RADIOTHERAPY: TREATMENT VISIT: OTHER

## 2024-10-30 NOTE — PROCEDURE: IMAGE GUIDED - SUPERFICIAL RADIOTHERAPY
Body Location Override (Optional): NASAL TIP
Detail Level: Detailed
Pathology Override (Pathology Will Render As Diagnosis Name If Left Blank): Infiltrative BCC
Field Number: 4
Lesion Dimensions-X Axis In Cm: 0.3
Lesion Margin Size In Cm: 1
Shield Size In Cm: 2.2
Applicator Size In Cm: 3.0 cm
Energy (Kv): 100
Treatment Time (Min): 0.42
Daily Dose (Cgy): 274.68
Number Of Fractions For Prescription 1: 21
Treatments Per Week: 3
Total Dose For Prescription 1 (Cgy): 5768.28
Add A Second Prescription?: No
Additional Fraction(S) Needed:: 0
Bill For Physics Consultation: No - Render Text Only
Physics Documentation: (Physics Check Verbiage)

## 2024-10-30 NOTE — PROCEDURE: IMAGE GUIDED - SUPERFICIAL RADIOTHERAPY: TREATMENT VISIT
Daily Dosage (Cgy): 274.68
Additional Change To Daily Dosage Administered Mid Treatment?: No
Bill For Ultrasound Evaluation (): Yes
Treatment Documentation: This patient has been treated today with image-guided superficial radiation therapy for non-melanoma skin \\ncancer. Written informed consent has been previously obtained from this patient for this treatment. This \\nconsent is documented in the patient's chart. The patient gave verbal consent to continue treatment today. \\nThe patient was treated with a specific radiation dose and setup as prescribed by the provider listed on this \\nvisit note. A Radiation Therapist performed administration of radiation under the supervision of a provider. \\nThe treatment parameters and cumulative dose are indicated above. Prior to administering the radiation, the \\npatient underwent a verification therapeutic radiology simulation-aided field setting defining relevant normal \\nand abnormal target anatomy and acquiring images with separate and distinct diagnostic high-frequency \\nultrasound to delineate tissues and determine whether to proceed with delivery of therapeutic, in addition \\nto retrieve data necessary to develop an optimal radiation treatment process for the patient. The field \\nplacement simulation documents any change seen in overall tumor volume documented in the patient’s \\nrecord, allows the clinician to indicate any needed changes in the treatment plan and/or prescription, \\nprovides diagnostic evaluation as the basis for performing the therapeutic procedure, and clearly identifies \\nthe information needed to decide to proceed with the therapeutic procedure. This process includes\\nverification of the treatment port(s) and proper treatment positioning. All treatment ports were \\nphotographed within electronic medical records. The patient's lead blocking along with gross tumor volume \\nand margin was confirmed. Considering superficial radiotherapy is clinical in setup, this requires the physician \\nand radiation therapist to clarify the location interest being treated against initial images, ultrasound, \\npathology, and patient anatomy. Care was taken to ensure pedersen treated were geometrically accurate and \\nproperly positioned using therapeutic radiology simulation-aided field setting verification per fraction. This \\nprocess is also utilized to determine if any prescription or setup changes are necessary. These steps are \\ntherefore medically necessary to ensure safe and effective administration of radiation. Ongoing therapeutic \\nradiology simulation-aided field setting verification is ordered throughout the course of therapy.\\nA high-frequency ultrasound image was acquired today for a two-dimensional evaluation of the tumor \\nvolume, depth, width, breadth, review of prior response to treatment, provide geometric accuracy of field \\nplacement, and determine whether to proceed with therapeutic delivery. \\nThe field placement and ultrasound imaging, per fraction, is separate and distinct from the initial simulation \\nand is an important task in providing safe administration of superficial radiation therapy. Physician evaluation \\nof the ultrasound information will be ongoing throughout the course of treatment and is deemed medically \\nnecessary to ensure the efficacy of treatment, whether to proceed with therapeutic delivery, and determine \\aviva necessary changes. Today's images were evaluated for determination of continuation of treatment with \\nthe current plan or with necessary changes as appropriate. Additionally, the use of ultrasound visualization \\nand targeted assessment allows the patient to be able to see their cancer(s) progress, encouraging the patient \\nto complete and maintain compliance through the full course of prescribed radiotherapy. Per Dr. Martinez , continued \\nultrasound guidance and therapeutic radiology simulation-aided field setting verification per fraction is \\nrequired for field placement, measurement of tumor depth, tissue evaluation, progress, acute effect \\nmonitoring, and determination for therapeutic treatment delivery is appropriate.
Total Cumulative Dose (Cgy): 5493.6
Additional Comments (Add Customization Of Note Here): Erythema and tenderness, patient has some dry blood in the treatment area.
Add X Modifier?: GONSALES - Unusual Non-Overlapping Service
Prescription Used: 1
Calculate Total Cumulative Dose Automatically Or Manually: Manually
Ultrasound Used Text: High frequency ultrasound depth is 2.06 mm, which is 0.47 mm in difference from previous imaging.
Fraction Number: 20
Ultrasound Not Used Text: Ultrasound was not performed today due to
Bill For Simulation (Per Medicare, Typical Course Of Radiation Therapy Will Require Between One To Three Simulations): Yes- (Simple- 1 Site: 91914)
Energy (Kv): 100

## 2024-10-30 NOTE — PROCEDURE: IMAGE GUIDED - SUPERFICIAL RADIOTHERAPY: EVALUATION VISIT
Bill For Ultrasound Evaluation (): Yes
Radiation Therapy Oncology Group (Rtog) Score: 2
Ultrasound Used Text: Ultrasound depth is mm.
Is This Visit For Evaluation During Treatment Or Follow Up Post Treatment?: evaluation
Additional Comments (Add Customization Of Note Here): Erythema, tenderness, bleeding
Ultrasound Not Used Text: Ultrasound was not performed today due to
Assessment: Appropriate reaction
Bill For Evaluation ([id number])?: No
Evaluation Plan: Plan: The patient is undergoing superficial radiation therapy for skin cancer and presents for weekly evaluation and management. Per protocol and as documented on the flow sheet, the patient was questioned as to subjective redness, pruritus, pain, drainage, fatigue, or any other symptoms. Objectively, the radiation area was evaluated with regards to erythema, atrophy, scale, crusting, erosion, ulceration, edema, purpura, tenderness, warmth, drainage, and any other findings. The plan was extensively reviewed including dose and dosing schedule. The simulation and clinical setup were also reviewed as were external and any internal shields and based on this review the appropriateness and sufficiency of treatment was determined. \\n\\nA high-frequency ultrasound image was acquired today for a two-dimensional evaluation of the tumor volume, depth, width, breadth, review of prior response to treatment, provide geometric accuracy of field placement, and determine whether to proceed with therapeutic delivery.\\n\\nPer Dr. Edward Martinez, continued ultrasound guidance and therapeutic radiology simulation-aided field setting \\nverification per fraction is required for field placement, measurement of tumor depth, tissues evaluation, progress, acute effect monitoring, and determination for therapeutic treatment delivery is appropriate.

## 2024-10-31 ENCOUNTER — APPOINTMENT (OUTPATIENT)
Dept: URBAN - METROPOLITAN AREA CLINIC 301 | Age: 79
Setting detail: DERMATOLOGY
End: 2024-10-31

## 2024-10-31 PROBLEM — C44.311 BASAL CELL CARCINOMA OF SKIN OF NOSE: Status: ACTIVE | Noted: 2024-10-31

## 2024-10-31 PROCEDURE — 77401 RADIATION TX DELIVERY SUPFC: CPT

## 2024-10-31 PROCEDURE — G6001 ECHO GUIDANCE RADIOTHERAPY: HCPCS | Mod: XU

## 2024-10-31 PROCEDURE — OTHER IMAGE GUIDED - SUPERFICIAL RADIOTHERAPY: TREATMENT VISIT: OTHER

## 2024-10-31 PROCEDURE — OTHER IMAGE GUIDED - SUPERFICIAL RADIOTHERAPY: OTHER

## 2024-10-31 PROCEDURE — 77280 THER RAD SIMULAJ FIELD SMPL: CPT

## 2024-10-31 NOTE — PROCEDURE: IMAGE GUIDED - SUPERFICIAL RADIOTHERAPY: TREATMENT VISIT
Daily Dosage (Cgy): 274.68
Additional Change To Daily Dosage Administered Mid Treatment?: No
Bill For Ultrasound Evaluation (): Yes
Treatment Documentation: This patient has been treated today with image-guided superficial radiation therapy for non-melanoma skin \\ncancer. Written informed consent has been previously obtained from this patient for this treatment. This \\nconsent is documented in the patient's chart. The patient gave verbal consent to continue treatment today. \\nThe patient was treated with a specific radiation dose and setup as prescribed by the provider listed on this \\nvisit note. A Radiation Therapist performed administration of radiation under the supervision of a provider. \\nThe treatment parameters and cumulative dose are indicated above. Prior to administering the radiation, the \\npatient underwent a verification therapeutic radiology simulation-aided field setting defining relevant normal \\nand abnormal target anatomy and acquiring images with separate and distinct diagnostic high-frequency \\nultrasound to delineate tissues and determine whether to proceed with delivery of therapeutic, in addition \\nto retrieve data necessary to develop an optimal radiation treatment process for the patient. The field \\nplacement simulation documents any change seen in overall tumor volume documented in the patient’s \\nrecord, allows the clinician to indicate any needed changes in the treatment plan and/or prescription, \\nprovides diagnostic evaluation as the basis for performing the therapeutic procedure, and clearly identifies \\nthe information needed to decide to proceed with the therapeutic procedure. This process includes\\nverification of the treatment port(s) and proper treatment positioning. All treatment ports were \\nphotographed within electronic medical records. The patient's lead blocking along with gross tumor volume \\nand margin was confirmed. Considering superficial radiotherapy is clinical in setup, this requires the physician \\nand radiation therapist to clarify the location interest being treated against initial images, ultrasound, \\npathology, and patient anatomy. Care was taken to ensure pedersen treated were geometrically accurate and \\nproperly positioned using therapeutic radiology simulation-aided field setting verification per fraction. This \\nprocess is also utilized to determine if any prescription or setup changes are necessary. These steps are \\ntherefore medically necessary to ensure safe and effective administration of radiation. Ongoing therapeutic \\nradiology simulation-aided field setting verification is ordered throughout the course of therapy.\\nA high-frequency ultrasound image was acquired today for a two-dimensional evaluation of the tumor \\nvolume, depth, width, breadth, review of prior response to treatment, provide geometric accuracy of field \\nplacement, and determine whether to proceed with therapeutic delivery. \\nThe field placement and ultrasound imaging, per fraction, is separate and distinct from the initial simulation \\nand is an important task in providing safe administration of superficial radiation therapy. Physician evaluation \\nof the ultrasound information will be ongoing throughout the course of treatment and is deemed medically \\nnecessary to ensure the efficacy of treatment, whether to proceed with therapeutic delivery, and determine \\aviva necessary changes. Today's images were evaluated for determination of continuation of treatment with \\nthe current plan or with necessary changes as appropriate. Additionally, the use of ultrasound visualization \\nand targeted assessment allows the patient to be able to see their cancer(s) progress, encouraging the patient \\nto complete and maintain compliance through the full course of prescribed radiotherapy. Per Dr. Martinez , continued \\nultrasound guidance and therapeutic radiology simulation-aided field setting verification per fraction is \\nrequired for field placement, measurement of tumor depth, tissue evaluation, progress, acute effect \\nmonitoring, and determination for therapeutic treatment delivery is appropriate.
Total Cumulative Dose (Cgy): 5768.28
Additional Comments (Add Customization Of Note Here): Erythema but area is somewhat healed from yesterday. Patient had some bleeding after US was preformed. She had no questions or complaints.
Add X Modifier?: GONSALES - Unusual Non-Overlapping Service
Prescription Used: 1
Calculate Total Cumulative Dose Automatically Or Manually: Manually
Ultrasound Used Text: High frequency ultrasound depth is 1.65 mm, which is 0.41 mm in difference from previous imaging.
Fraction Number: 21
Ultrasound Not Used Text: Ultrasound was not performed today due to
Bill For Simulation (Per Medicare, Typical Course Of Radiation Therapy Will Require Between One To Three Simulations): Yes- (Simple- 1 Site: 31065)
Energy (Kv): 100

## 2024-11-03 ENCOUNTER — APPOINTMENT (OUTPATIENT)
Dept: URBAN - METROPOLITAN AREA CLINIC 301 | Age: 79
Setting detail: DERMATOLOGY
End: 2024-11-03

## 2024-11-03 PROBLEM — C44.311 BASAL CELL CARCINOMA OF SKIN OF NOSE: Status: ACTIVE | Noted: 2024-11-03

## 2024-11-03 PROCEDURE — OTHER IMAGE GUIDED - SUPERFICIAL RADIOTHERAPY: OTHER

## 2024-11-03 PROCEDURE — 77336 RADIATION PHYSICS CONSULT: CPT

## 2024-11-03 NOTE — PROCEDURE: IMAGE GUIDED - SUPERFICIAL RADIOTHERAPY
Body Location Override (Optional): NASAL TIP
Detail Level: Detailed
Pathology Override (Pathology Will Render As Diagnosis Name If Left Blank): Infiltrative BCC
Field Number: 4
Lesion Dimensions-X Axis In Cm: 0.3
Lesion Margin Size In Cm: 1
Shield Size In Cm: 2.2
Applicator Size In Cm: 3.0 cm
Energy (Kv): 100
Treatment Time (Min): 0.42
Daily Dose (Cgy): 274.68
Number Of Fractions For Prescription 1: 21
Treatments Per Week: 3
Total Dose For Prescription 1 (Cgy): 5768.28
Add A Second Prescription?: No
Additional Fraction(S) Needed:: 0
Add Physics Consultation: Yes
Physics Consultation Performed For Fractions:: 17-21
Physics Documentation: Per the request of Dr. Martinez, continuing medical physics review as per radiotherapy standard of care post every 5th fraction for patient, including assessment of treatment parameters,  of dose delivery, and review of patient treatment documentation in support of the provider, to ensure efficacy and continued safe delivery of radiotherapy. Included in physics check is review of patient setup information, all pertinent simulation and treatment photographs checks, prescription, dose calculation verification, per fraction dose charted correctly, elapsed days and treatment days correctly charted, cumulative dose correct, and review of any prescription changes. Patient was not present, nor was it necessary for the patient to be present for weekly physics review and no other superficial radiotherapy services were rendered on this day. Continued medical physics review post every 5th fraction of therapy is requested by provider for appropriate radiotherapy management and is deemed medically necessary and standard of care.

## 2024-12-17 ENCOUNTER — APPOINTMENT (OUTPATIENT)
Dept: URBAN - METROPOLITAN AREA CLINIC 301 | Age: 79
Setting detail: DERMATOLOGY
End: 2024-12-18

## 2024-12-17 PROBLEM — C44.311 BASAL CELL CARCINOMA OF SKIN OF NOSE: Status: ACTIVE | Noted: 2024-12-17

## 2024-12-17 PROCEDURE — G6001 ECHO GUIDANCE RADIOTHERAPY: HCPCS

## 2024-12-17 PROCEDURE — OTHER IMAGE GUIDED - SUPERFICIAL RADIOTHERAPY: EVALUATION VISIT: OTHER

## 2024-12-17 PROCEDURE — 99212 OFFICE O/P EST SF 10 MIN: CPT

## 2024-12-17 PROCEDURE — OTHER IMAGE GUIDED - SUPERFICIAL RADIOTHERAPY: OTHER

## 2024-12-17 NOTE — PROCEDURE: IMAGE GUIDED - SUPERFICIAL RADIOTHERAPY: EVALUATION VISIT
Assessment: Appropriate reaction
Is This Visit For Evaluation During Treatment Or Follow Up Post Treatment?: evaluation
Bill For Evaluation (88141)?: No
Bill For Ultrasound Evaluation (): Yes
Ultrasound Used Text: Ultrasound shows no sign of disease.
Evaluation Plan: Per the request of Dr. Edward Martinez, patient was seen today for a 6 week follow up for Superficial Radiation Therapy. Physician evaluation of the ultrasound tumor depth, width, and breadth was ongoing through course of treatment and is deemed medically necessary ensuring efficacy of treatment. All appropriate blocking and treatment parameters verified by radiation therapist according to initial simulation. A high frequency ultrasound image was acquired today for two-dimensional evaluation of treatment volume and response to treatment, in addition, geometric accuracy of field placement. Today’s image was evaluated, lesion not detected on US.\\nObjectively, the treated radiation area was evaluated with regards to erythema, atrophy, scale, crusting, erosion, ulceration, edema, purpura, tenderness, warmth, drainage, and any other finding. Patient to follow up for routine visits.
Radiation Therapy Oncology Group (Rtog) Score: 0
Additional Comments (Add Customization Of Note Here): No evidence of disease present at this time. Treatment area is well healed and patient had no questions or concerns.
Ultrasound Not Used Text: Ultrasound was not performed today due to
